# Patient Record
Sex: FEMALE | Race: WHITE | Employment: FULL TIME | URBAN - NONMETROPOLITAN AREA
[De-identification: names, ages, dates, MRNs, and addresses within clinical notes are randomized per-mention and may not be internally consistent; named-entity substitution may affect disease eponyms.]

---

## 2017-02-01 ENCOUNTER — OFFICE VISIT (OUTPATIENT)
Dept: PRIMARY CARE CLINIC | Age: 32
End: 2017-02-01

## 2017-02-01 VITALS
HEIGHT: 64 IN | TEMPERATURE: 98.2 F | WEIGHT: 181.8 LBS | DIASTOLIC BLOOD PRESSURE: 90 MMHG | HEART RATE: 79 BPM | SYSTOLIC BLOOD PRESSURE: 132 MMHG | RESPIRATION RATE: 16 BRPM | OXYGEN SATURATION: 100 % | BODY MASS INDEX: 31.04 KG/M2

## 2017-02-01 DIAGNOSIS — J06.9 ACUTE URI: ICD-10-CM

## 2017-02-01 DIAGNOSIS — H66.91 RIGHT OTITIS MEDIA, UNSPECIFIED CHRONICITY, UNSPECIFIED OTITIS MEDIA TYPE: Primary | ICD-10-CM

## 2017-02-01 DIAGNOSIS — R51.9 INTRACTABLE EPISODIC HEADACHE, UNSPECIFIED HEADACHE TYPE: ICD-10-CM

## 2017-02-01 PROCEDURE — 99202 OFFICE O/P NEW SF 15 MIN: CPT | Performed by: NURSE PRACTITIONER

## 2017-02-01 RX ORDER — AMOXICILLIN AND CLAVULANATE POTASSIUM 875; 125 MG/1; MG/1
1 TABLET, FILM COATED ORAL 2 TIMES DAILY
Qty: 20 TABLET | Refills: 0 | Status: SHIPPED | OUTPATIENT
Start: 2017-02-01 | End: 2017-02-11

## 2017-02-01 RX ORDER — FLUTICASONE PROPIONATE 50 MCG
2 SPRAY, SUSPENSION (ML) NASAL DAILY
Qty: 1 BOTTLE | Refills: 0 | Status: SHIPPED | OUTPATIENT
Start: 2017-02-01 | End: 2017-06-27 | Stop reason: ALTCHOICE

## 2017-02-01 ASSESSMENT — ENCOUNTER SYMPTOMS
SORE THROAT: 1
RESPIRATORY NEGATIVE: 1

## 2017-02-22 ENCOUNTER — OFFICE VISIT (OUTPATIENT)
Dept: PRIMARY CARE CLINIC | Age: 32
End: 2017-02-22

## 2017-02-22 VITALS
HEIGHT: 64 IN | HEART RATE: 72 BPM | BODY MASS INDEX: 33.63 KG/M2 | WEIGHT: 197 LBS | OXYGEN SATURATION: 99 % | DIASTOLIC BLOOD PRESSURE: 84 MMHG | SYSTOLIC BLOOD PRESSURE: 130 MMHG

## 2017-02-22 DIAGNOSIS — S69.91XA FINGER INJURY, RIGHT, INITIAL ENCOUNTER: ICD-10-CM

## 2017-02-22 DIAGNOSIS — M54.50 ACUTE RIGHT-SIDED LOW BACK PAIN WITHOUT SCIATICA: ICD-10-CM

## 2017-02-22 DIAGNOSIS — S62.646A CLOSED NONDISPLACED FRACTURE OF PROXIMAL PHALANX OF RIGHT LITTLE FINGER, INITIAL ENCOUNTER: Primary | ICD-10-CM

## 2017-02-22 PROCEDURE — 99214 OFFICE O/P EST MOD 30 MIN: CPT | Performed by: FAMILY MEDICINE

## 2017-02-22 PROCEDURE — L3927 FO PIP DIP NO JT SPR PRE OTS: HCPCS | Performed by: FAMILY MEDICINE

## 2017-02-22 RX ORDER — HYDROCODONE BITARTRATE AND ACETAMINOPHEN 5; 325 MG/1; MG/1
1 TABLET ORAL EVERY 6 HOURS PRN
Qty: 20 TABLET | Refills: 0 | Status: SHIPPED | OUTPATIENT
Start: 2017-02-22 | End: 2017-02-27

## 2017-02-22 RX ORDER — CYCLOBENZAPRINE HCL 5 MG
5 TABLET ORAL NIGHTLY PRN
Qty: 30 TABLET | Refills: 1 | Status: SHIPPED | OUTPATIENT
Start: 2017-02-22 | End: 2017-03-04

## 2017-02-22 ASSESSMENT — ENCOUNTER SYMPTOMS
CONSTIPATION: 0
ABDOMINAL PAIN: 0
BACK PAIN: 1
BOWEL INCONTINENCE: 0
DIARRHEA: 0

## 2017-02-27 ENCOUNTER — OFFICE VISIT (OUTPATIENT)
Dept: ORTHOPEDIC SURGERY | Age: 32
End: 2017-02-27

## 2017-02-27 VITALS
SYSTOLIC BLOOD PRESSURE: 122 MMHG | BODY MASS INDEX: 33.63 KG/M2 | HEIGHT: 64 IN | DIASTOLIC BLOOD PRESSURE: 74 MMHG | WEIGHT: 197 LBS | HEART RATE: 74 BPM

## 2017-02-27 DIAGNOSIS — S62.609A FINGER FRACTURE, RIGHT, CLOSED, INITIAL ENCOUNTER: Primary | ICD-10-CM

## 2017-02-27 PROCEDURE — 26720 TREAT FINGER FRACTURE EACH: CPT | Performed by: PHYSICIAN ASSISTANT

## 2017-02-27 RX ORDER — TRAMADOL HYDROCHLORIDE 50 MG/1
50 TABLET ORAL EVERY 6 HOURS PRN
Qty: 50 TABLET | Refills: 0 | Status: SHIPPED | OUTPATIENT
Start: 2017-02-27 | End: 2017-03-09

## 2017-03-09 DIAGNOSIS — S62.609S: Primary | ICD-10-CM

## 2017-03-14 ENCOUNTER — OFFICE VISIT (OUTPATIENT)
Dept: ORTHOPEDIC SURGERY | Age: 32
End: 2017-03-14

## 2017-03-14 VITALS
BODY MASS INDEX: 33.63 KG/M2 | SYSTOLIC BLOOD PRESSURE: 118 MMHG | HEART RATE: 64 BPM | DIASTOLIC BLOOD PRESSURE: 70 MMHG | WEIGHT: 197 LBS | HEIGHT: 64 IN

## 2017-03-14 DIAGNOSIS — S62.609S: Primary | ICD-10-CM

## 2017-03-14 PROCEDURE — 99024 POSTOP FOLLOW-UP VISIT: CPT | Performed by: PHYSICIAN ASSISTANT

## 2017-03-14 RX ORDER — TRAMADOL HYDROCHLORIDE 50 MG/1
50 TABLET ORAL PRN
COMMUNITY
End: 2017-03-21 | Stop reason: ALTCHOICE

## 2017-03-21 ENCOUNTER — OFFICE VISIT (OUTPATIENT)
Dept: FAMILY MEDICINE CLINIC | Age: 32
End: 2017-03-21
Payer: MEDICARE

## 2017-03-21 ENCOUNTER — NURSE ONLY (OUTPATIENT)
Dept: LAB | Age: 32
End: 2017-03-21
Payer: MEDICARE

## 2017-03-21 VITALS
HEIGHT: 64 IN | SYSTOLIC BLOOD PRESSURE: 138 MMHG | OXYGEN SATURATION: 99 % | BODY MASS INDEX: 33.46 KG/M2 | HEART RATE: 77 BPM | DIASTOLIC BLOOD PRESSURE: 70 MMHG | WEIGHT: 196 LBS

## 2017-03-21 DIAGNOSIS — F32.A ANXIETY AND DEPRESSION: ICD-10-CM

## 2017-03-21 DIAGNOSIS — Z12.4 SCREENING FOR CERVICAL CANCER: ICD-10-CM

## 2017-03-21 DIAGNOSIS — Z23 IMMUNIZATION DUE: ICD-10-CM

## 2017-03-21 DIAGNOSIS — H61.21 IMPACTED CERUMEN OF RIGHT EAR: ICD-10-CM

## 2017-03-21 DIAGNOSIS — Z76.89 ENCOUNTER TO ESTABLISH CARE: Primary | ICD-10-CM

## 2017-03-21 DIAGNOSIS — H66.013 ACUTE SUPPURATIVE OTITIS MEDIA OF BOTH EARS WITH SPONTANEOUS RUPTURE OF TYMPANIC MEMBRANES, RECURRENCE NOT SPECIFIED: ICD-10-CM

## 2017-03-21 DIAGNOSIS — Z11.4 ENCOUNTER FOR SCREENING FOR HIV: ICD-10-CM

## 2017-03-21 DIAGNOSIS — F41.9 ANXIETY AND DEPRESSION: ICD-10-CM

## 2017-03-21 PROCEDURE — 90686 IIV4 VACC NO PRSV 0.5 ML IM: CPT | Performed by: NURSE PRACTITIONER

## 2017-03-21 PROCEDURE — 90471 IMMUNIZATION ADMIN: CPT | Performed by: NURSE PRACTITIONER

## 2017-03-21 PROCEDURE — 90715 TDAP VACCINE 7 YRS/> IM: CPT | Performed by: NURSE PRACTITIONER

## 2017-03-21 PROCEDURE — 99213 OFFICE O/P EST LOW 20 MIN: CPT | Performed by: NURSE PRACTITIONER

## 2017-03-21 PROCEDURE — 99395 PREV VISIT EST AGE 18-39: CPT | Performed by: NURSE PRACTITIONER

## 2017-03-21 PROCEDURE — 90472 IMMUNIZATION ADMIN EACH ADD: CPT | Performed by: NURSE PRACTITIONER

## 2017-03-21 RX ORDER — ESCITALOPRAM OXALATE 20 MG/1
20 TABLET ORAL DAILY
Qty: 30 TABLET | Refills: 0 | Status: SHIPPED | OUTPATIENT
Start: 2017-03-21 | End: 2017-04-17 | Stop reason: SDUPTHER

## 2017-03-21 RX ORDER — HYDROXYZINE PAMOATE 25 MG/1
25 CAPSULE ORAL 2 TIMES DAILY PRN
Qty: 14 CAPSULE | Refills: 0 | Status: SHIPPED | OUTPATIENT
Start: 2017-03-21 | End: 2017-04-17 | Stop reason: SDUPTHER

## 2017-03-21 RX ORDER — ESCITALOPRAM OXALATE 10 MG/1
10 TABLET ORAL DAILY
Qty: 7 TABLET | Refills: 0 | Status: SHIPPED | OUTPATIENT
Start: 2017-03-21 | End: 2017-04-17 | Stop reason: DRUGHIGH

## 2017-03-21 RX ORDER — CEFDINIR 300 MG/1
300 CAPSULE ORAL 2 TIMES DAILY
Qty: 20 CAPSULE | Refills: 0 | Status: SHIPPED | OUTPATIENT
Start: 2017-03-21 | End: 2017-03-31

## 2017-03-21 ASSESSMENT — ENCOUNTER SYMPTOMS
TROUBLE SWALLOWING: 0
CONSTIPATION: 0
SINUS PRESSURE: 0
COUGH: 0
ABDOMINAL PAIN: 0
VOMITING: 0
ALLERGIC/IMMUNOLOGIC NEGATIVE: 1
EYES NEGATIVE: 1
RESPIRATORY NEGATIVE: 1
GASTROINTESTINAL NEGATIVE: 1
CHEST TIGHTNESS: 0
NAUSEA: 0
SHORTNESS OF BREATH: 0
DIARRHEA: 0

## 2017-04-17 ENCOUNTER — NURSE ONLY (OUTPATIENT)
Dept: LAB | Age: 32
End: 2017-04-17
Payer: MEDICARE

## 2017-04-17 ENCOUNTER — OFFICE VISIT (OUTPATIENT)
Dept: FAMILY MEDICINE CLINIC | Age: 32
End: 2017-04-17
Payer: MEDICARE

## 2017-04-17 VITALS
TEMPERATURE: 97.5 F | BODY MASS INDEX: 32.03 KG/M2 | WEIGHT: 187.6 LBS | OXYGEN SATURATION: 98 % | SYSTOLIC BLOOD PRESSURE: 106 MMHG | RESPIRATION RATE: 12 BRPM | HEIGHT: 64 IN | HEART RATE: 70 BPM | DIASTOLIC BLOOD PRESSURE: 70 MMHG

## 2017-04-17 DIAGNOSIS — F41.9 ANXIETY AND DEPRESSION: Primary | ICD-10-CM

## 2017-04-17 DIAGNOSIS — Z00.00 ROUTINE HEALTH MAINTENANCE: ICD-10-CM

## 2017-04-17 DIAGNOSIS — F32.A ANXIETY AND DEPRESSION: Primary | ICD-10-CM

## 2017-04-17 DIAGNOSIS — Z71.6 ENCOUNTER FOR TOBACCO USE CESSATION COUNSELING: ICD-10-CM

## 2017-04-17 PROCEDURE — 90732 PPSV23 VACC 2 YRS+ SUBQ/IM: CPT | Performed by: NURSE PRACTITIONER

## 2017-04-17 PROCEDURE — 90471 IMMUNIZATION ADMIN: CPT | Performed by: NURSE PRACTITIONER

## 2017-04-17 PROCEDURE — 99214 OFFICE O/P EST MOD 30 MIN: CPT | Performed by: NURSE PRACTITIONER

## 2017-04-17 RX ORDER — CYCLOBENZAPRINE HCL 5 MG
5 TABLET ORAL DAILY PRN
COMMUNITY
Start: 2017-03-21 | End: 2017-05-15 | Stop reason: ALTCHOICE

## 2017-04-17 RX ORDER — HYDROXYZINE PAMOATE 25 MG/1
25 CAPSULE ORAL 2 TIMES DAILY PRN
Qty: 14 CAPSULE | Refills: 0 | Status: SHIPPED | OUTPATIENT
Start: 2017-04-17 | End: 2017-04-24

## 2017-04-17 RX ORDER — VARENICLINE TARTRATE 1 MG/1
1 TABLET, FILM COATED ORAL 2 TIMES DAILY
Qty: 60 TABLET | Refills: 3 | Status: SHIPPED | OUTPATIENT
Start: 2017-04-17 | End: 2017-06-09

## 2017-04-17 RX ORDER — ESCITALOPRAM OXALATE 20 MG/1
20 TABLET ORAL DAILY
Qty: 30 TABLET | Refills: 3 | Status: SHIPPED | OUTPATIENT
Start: 2017-04-17 | End: 2017-06-09 | Stop reason: ALTCHOICE

## 2017-04-17 ASSESSMENT — PATIENT HEALTH QUESTIONNAIRE - PHQ9
2. FEELING DOWN, DEPRESSED OR HOPELESS: 1
1. LITTLE INTEREST OR PLEASURE IN DOING THINGS: 0
SUM OF ALL RESPONSES TO PHQ QUESTIONS 1-9: 1
SUM OF ALL RESPONSES TO PHQ9 QUESTIONS 1 & 2: 1

## 2017-04-17 ASSESSMENT — ENCOUNTER SYMPTOMS
SINUS PRESSURE: 0
COUGH: 0
DIARRHEA: 0
CONSTIPATION: 0
CHEST TIGHTNESS: 0
ABDOMINAL PAIN: 0
ALLERGIC/IMMUNOLOGIC NEGATIVE: 1
TROUBLE SWALLOWING: 0
GASTROINTESTINAL NEGATIVE: 1
SHORTNESS OF BREATH: 0
VOMITING: 0
RESPIRATORY NEGATIVE: 1
NAUSEA: 0
EYES NEGATIVE: 1

## 2017-05-05 DIAGNOSIS — M25.561 RIGHT KNEE PAIN, UNSPECIFIED CHRONICITY: Primary | ICD-10-CM

## 2017-05-09 ENCOUNTER — TELEPHONE (OUTPATIENT)
Dept: FAMILY MEDICINE CLINIC | Age: 32
End: 2017-05-09

## 2017-05-11 ENCOUNTER — HOSPITAL ENCOUNTER (OUTPATIENT)
Age: 32
Setting detail: SPECIMEN
Discharge: HOME | End: 2017-05-11
Payer: MEDICARE

## 2017-05-11 LAB
HIV AG/AB: NONREACTIVE
T4 TOTAL: 5.3 UG/DL (ref 4.5–12)
VITAMIN D 25-HYDROXY: 13.6 NG/ML (ref 30–100)

## 2017-05-12 DIAGNOSIS — E55.9 VITAMIN D DEFICIENCY: Primary | ICD-10-CM

## 2017-05-12 RX ORDER — ERGOCALCIFEROL 1.25 MG/1
50000 CAPSULE ORAL WEEKLY
Qty: 12 CAPSULE | Refills: 0 | Status: SHIPPED | OUTPATIENT
Start: 2017-05-12 | End: 2018-07-31 | Stop reason: ALTCHOICE

## 2017-05-15 ENCOUNTER — OFFICE VISIT (OUTPATIENT)
Dept: PRIMARY CARE CLINIC | Age: 32
End: 2017-05-15
Payer: MEDICARE

## 2017-05-15 VITALS
HEIGHT: 64 IN | SYSTOLIC BLOOD PRESSURE: 126 MMHG | BODY MASS INDEX: 35.15 KG/M2 | OXYGEN SATURATION: 98 % | TEMPERATURE: 98.3 F | DIASTOLIC BLOOD PRESSURE: 74 MMHG | WEIGHT: 205.9 LBS | HEART RATE: 88 BPM

## 2017-05-15 DIAGNOSIS — K02.9 PAIN DUE TO DENTAL CARIES: Primary | ICD-10-CM

## 2017-05-15 PROCEDURE — 99213 OFFICE O/P EST LOW 20 MIN: CPT | Performed by: PHYSICIAN ASSISTANT

## 2017-05-15 RX ORDER — PENICILLIN V POTASSIUM 500 MG/1
500 TABLET ORAL 3 TIMES DAILY
Qty: 30 TABLET | Refills: 0 | Status: SHIPPED | OUTPATIENT
Start: 2017-05-15 | End: 2017-05-25

## 2017-05-15 RX ORDER — IBUPROFEN 800 MG/1
800 TABLET ORAL EVERY 8 HOURS PRN
Qty: 30 TABLET | Refills: 0 | Status: SHIPPED | OUTPATIENT
Start: 2017-05-15 | End: 2017-06-17

## 2017-05-15 RX ORDER — ACETAMINOPHEN AND CODEINE PHOSPHATE 300; 30 MG/1; MG/1
1 TABLET ORAL EVERY 6 HOURS PRN
Qty: 10 TABLET | Refills: 0 | Status: SHIPPED | OUTPATIENT
Start: 2017-05-15 | End: 2017-06-27 | Stop reason: ALTCHOICE

## 2017-05-15 RX ORDER — KETOROLAC TROMETHAMINE 30 MG/ML
60 INJECTION, SOLUTION INTRAMUSCULAR; INTRAVENOUS ONCE
Status: COMPLETED | OUTPATIENT
Start: 2017-05-15 | End: 2017-05-15

## 2017-05-15 RX ADMIN — KETOROLAC TROMETHAMINE 60 MG: 30 INJECTION, SOLUTION INTRAMUSCULAR; INTRAVENOUS at 21:01

## 2017-05-15 ASSESSMENT — ENCOUNTER SYMPTOMS: RESPIRATORY NEGATIVE: 1

## 2017-05-16 ENCOUNTER — TELEPHONE (OUTPATIENT)
Dept: FAMILY MEDICINE CLINIC | Age: 32
End: 2017-05-16

## 2017-06-02 DIAGNOSIS — Z32.00 PREGNANCY EXAMINATION OR TEST, PREGNANCY UNCONFIRMED: Primary | ICD-10-CM

## 2017-06-09 ENCOUNTER — OFFICE VISIT (OUTPATIENT)
Dept: FAMILY MEDICINE CLINIC | Age: 32
End: 2017-06-09
Payer: MEDICARE

## 2017-06-09 VITALS
WEIGHT: 197 LBS | DIASTOLIC BLOOD PRESSURE: 78 MMHG | OXYGEN SATURATION: 98 % | HEIGHT: 64 IN | BODY MASS INDEX: 33.63 KG/M2 | HEART RATE: 78 BPM | SYSTOLIC BLOOD PRESSURE: 128 MMHG

## 2017-06-09 DIAGNOSIS — K59.03 DRUG-INDUCED CONSTIPATION: ICD-10-CM

## 2017-06-09 DIAGNOSIS — F32.A DEPRESSION, UNSPECIFIED DEPRESSION TYPE: ICD-10-CM

## 2017-06-09 DIAGNOSIS — Z72.0 TOBACCO ABUSE: ICD-10-CM

## 2017-06-09 DIAGNOSIS — Z91.09 ENVIRONMENTAL ALLERGIES: Primary | ICD-10-CM

## 2017-06-09 PROCEDURE — 99214 OFFICE O/P EST MOD 30 MIN: CPT | Performed by: NURSE PRACTITIONER

## 2017-06-09 RX ORDER — FEXOFENADINE HCL 180 MG/1
180 TABLET ORAL DAILY
Qty: 30 TABLET | Refills: 1 | Status: SHIPPED | OUTPATIENT
Start: 2017-06-09 | End: 2017-07-07 | Stop reason: ALTCHOICE

## 2017-06-09 RX ORDER — ARIPIPRAZOLE 5 MG/1
5 TABLET ORAL DAILY
Qty: 30 TABLET | Refills: 1 | Status: SHIPPED | OUTPATIENT
Start: 2017-06-09 | End: 2017-11-16 | Stop reason: ALTCHOICE

## 2017-06-09 RX ORDER — POLYETHYLENE GLYCOL 3350 17 G/17G
17 POWDER, FOR SOLUTION ORAL DAILY PRN
Qty: 510 G | Refills: 0 | Status: SHIPPED | OUTPATIENT
Start: 2017-06-09 | End: 2017-08-08 | Stop reason: SDUPTHER

## 2017-06-09 ASSESSMENT — ENCOUNTER SYMPTOMS
SINUS PRESSURE: 0
EYES NEGATIVE: 1
ABDOMINAL PAIN: 0
GASTROINTESTINAL NEGATIVE: 1
VOMITING: 0
DIARRHEA: 0
COUGH: 0
CHEST TIGHTNESS: 0
TROUBLE SWALLOWING: 0
SHORTNESS OF BREATH: 0
RESPIRATORY NEGATIVE: 1
NAUSEA: 0
ALLERGIC/IMMUNOLOGIC NEGATIVE: 1
CONSTIPATION: 0

## 2017-06-12 ENCOUNTER — OFFICE VISIT (OUTPATIENT)
Dept: PRIMARY CARE CLINIC | Age: 32
End: 2017-06-12
Payer: MEDICARE

## 2017-06-12 VITALS
SYSTOLIC BLOOD PRESSURE: 128 MMHG | RESPIRATION RATE: 16 BRPM | OXYGEN SATURATION: 99 % | WEIGHT: 200 LBS | HEART RATE: 58 BPM | DIASTOLIC BLOOD PRESSURE: 68 MMHG | BODY MASS INDEX: 34.15 KG/M2 | HEIGHT: 64 IN | TEMPERATURE: 98.1 F

## 2017-06-12 DIAGNOSIS — J02.9 SORE THROAT: ICD-10-CM

## 2017-06-12 DIAGNOSIS — J02.9 SORE THROAT: Primary | ICD-10-CM

## 2017-06-12 LAB
MONONUCLEOSIS SCREEN: NEGATIVE
S PYO AG THROAT QL: NORMAL

## 2017-06-12 PROCEDURE — 36415 COLL VENOUS BLD VENIPUNCTURE: CPT | Performed by: FAMILY MEDICINE

## 2017-06-12 PROCEDURE — 87880 STREP A ASSAY W/OPTIC: CPT | Performed by: FAMILY MEDICINE

## 2017-06-12 PROCEDURE — 99213 OFFICE O/P EST LOW 20 MIN: CPT | Performed by: FAMILY MEDICINE

## 2017-06-12 PROCEDURE — 86308 HETEROPHILE ANTIBODY SCREEN: CPT | Performed by: FAMILY MEDICINE

## 2017-06-12 RX ORDER — PREDNISONE 20 MG/1
20 TABLET ORAL DAILY
Qty: 7 TABLET | Refills: 0 | Status: SHIPPED | OUTPATIENT
Start: 2017-06-12 | End: 2017-06-19

## 2017-06-12 ASSESSMENT — ENCOUNTER SYMPTOMS
CHOKING: 0
WHEEZING: 0
SINUS PRESSURE: 0
DIARRHEA: 0
CONSTIPATION: 0
NAUSEA: 0
TROUBLE SWALLOWING: 0
SHORTNESS OF BREATH: 0
COUGH: 0
SORE THROAT: 1
CHEST TIGHTNESS: 0

## 2017-06-14 ENCOUNTER — TELEPHONE (OUTPATIENT)
Dept: FAMILY MEDICINE CLINIC | Age: 32
End: 2017-06-14

## 2017-06-14 DIAGNOSIS — Z72.0 TOBACCO ABUSE: Primary | ICD-10-CM

## 2017-06-14 RX ORDER — NICOTINE 21 MG/24HR
1 PATCH, TRANSDERMAL 24 HOURS TRANSDERMAL EVERY 24 HOURS
Qty: 30 PATCH | Refills: 3 | Status: SHIPPED | OUTPATIENT
Start: 2017-06-14 | End: 2017-11-16

## 2017-06-17 ENCOUNTER — HOSPITAL ENCOUNTER (EMERGENCY)
Age: 32
Discharge: HOME | End: 2017-06-17
Attending: EMERGENCY MEDICINE
Payer: MEDICARE

## 2017-06-17 ENCOUNTER — APPOINTMENT (OUTPATIENT)
Dept: ULTRASOUND IMAGING | Age: 32
End: 2017-06-17
Payer: MEDICARE

## 2017-06-17 VITALS
OXYGEN SATURATION: 96 % | HEART RATE: 58 BPM | HEIGHT: 64 IN | SYSTOLIC BLOOD PRESSURE: 126 MMHG | DIASTOLIC BLOOD PRESSURE: 75 MMHG | WEIGHT: 203 LBS | BODY MASS INDEX: 34.66 KG/M2 | RESPIRATION RATE: 18 BRPM | TEMPERATURE: 97.9 F

## 2017-06-17 DIAGNOSIS — R10.9 ABDOMINAL CRAMPING: ICD-10-CM

## 2017-06-17 DIAGNOSIS — R10.2 PELVIC PAIN IN FEMALE: ICD-10-CM

## 2017-06-17 DIAGNOSIS — N92.0 SPOTTING: Primary | ICD-10-CM

## 2017-06-17 LAB
-: ABNORMAL
AMORPHOUS: ABNORMAL
BACTERIA: ABNORMAL
BILIRUBIN URINE: NEGATIVE
CASTS UA: ABNORMAL /LPF (ref 0–2)
COLOR: ABNORMAL
COMMENT UA: ABNORMAL
CRYSTALS, UA: ABNORMAL /HPF
DIRECT EXAM: NORMAL
EPITHELIAL CELLS UA: ABNORMAL /HPF (ref 0–5)
GLUCOSE URINE: NEGATIVE
HCG QUALITATIVE: NEGATIVE
KETONES, URINE: NEGATIVE
LEUKOCYTE ESTERASE, URINE: NEGATIVE
Lab: NORMAL
MUCUS: ABNORMAL
NITRITE, URINE: NEGATIVE
OTHER OBSERVATIONS UA: ABNORMAL
PH UA: 5.5 (ref 5–6)
PROTEIN UA: NEGATIVE
RBC UA: >100 /HPF (ref 0–4)
RENAL EPITHELIAL, UA: ABNORMAL /HPF
SPECIFIC GRAVITY UA: 1.02 (ref 1.01–1.02)
SPECIMEN DESCRIPTION: NORMAL
STATUS: NORMAL
TRICHOMONAS: ABNORMAL
TURBIDITY: ABNORMAL
URINE HGB: ABNORMAL
UROBILINOGEN, URINE: NORMAL
WBC UA: ABNORMAL /HPF (ref 0–4)
YEAST: ABNORMAL

## 2017-06-17 PROCEDURE — 99284 EMERGENCY DEPT VISIT MOD MDM: CPT

## 2017-06-17 PROCEDURE — 76830 TRANSVAGINAL US NON-OB: CPT | Performed by: RADIOLOGY

## 2017-06-17 PROCEDURE — 96372 THER/PROPH/DIAG INJ SC/IM: CPT

## 2017-06-17 PROCEDURE — 84703 CHORIONIC GONADOTROPIN ASSAY: CPT

## 2017-06-17 PROCEDURE — 81001 URINALYSIS AUTO W/SCOPE: CPT

## 2017-06-17 PROCEDURE — 87210 SMEAR WET MOUNT SALINE/INK: CPT

## 2017-06-17 PROCEDURE — 6360000002 HC RX W HCPCS: Performed by: EMERGENCY MEDICINE

## 2017-06-17 PROCEDURE — 76856 US EXAM PELVIC COMPLETE: CPT | Performed by: RADIOLOGY

## 2017-06-17 PROCEDURE — 36415 COLL VENOUS BLD VENIPUNCTURE: CPT

## 2017-06-17 PROCEDURE — 76856 US EXAM PELVIC COMPLETE: CPT

## 2017-06-17 PROCEDURE — 87491 CHLMYD TRACH DNA AMP PROBE: CPT

## 2017-06-17 PROCEDURE — 93975 VASCULAR STUDY: CPT

## 2017-06-17 PROCEDURE — 93975 VASCULAR STUDY: CPT | Performed by: RADIOLOGY

## 2017-06-17 PROCEDURE — 76830 TRANSVAGINAL US NON-OB: CPT

## 2017-06-17 PROCEDURE — 87591 N.GONORRHOEAE DNA AMP PROB: CPT

## 2017-06-17 RX ORDER — IBUPROFEN 600 MG/1
600 TABLET ORAL EVERY 6 HOURS PRN
Qty: 30 TABLET | Refills: 0 | Status: SHIPPED | OUTPATIENT
Start: 2017-06-17 | End: 2017-07-07 | Stop reason: ALTCHOICE

## 2017-06-17 RX ORDER — KETOROLAC TROMETHAMINE 30 MG/ML
30 INJECTION, SOLUTION INTRAMUSCULAR; INTRAVENOUS ONCE
Status: COMPLETED | OUTPATIENT
Start: 2017-06-17 | End: 2017-06-17

## 2017-06-17 RX ADMIN — KETOROLAC TROMETHAMINE 30 MG: 30 INJECTION, SOLUTION INTRAMUSCULAR at 20:39

## 2017-06-17 ASSESSMENT — PAIN SCALES - WONG BAKER
WONGBAKER_NUMERICALRESPONSE: 6
WONGBAKER_NUMERICALRESPONSE: 6

## 2017-06-17 ASSESSMENT — PAIN DESCRIPTION - ORIENTATION: ORIENTATION: LEFT;LOWER;RIGHT

## 2017-06-17 ASSESSMENT — PAIN DESCRIPTION - PAIN TYPE: TYPE: ACUTE PAIN

## 2017-06-17 ASSESSMENT — PAIN SCALES - GENERAL
PAINLEVEL_OUTOF10: 5
PAINLEVEL_OUTOF10: 6
PAINLEVEL_OUTOF10: 6

## 2017-06-17 ASSESSMENT — PAIN DESCRIPTION - LOCATION: LOCATION: ABDOMEN

## 2017-06-17 ASSESSMENT — ENCOUNTER SYMPTOMS
RESPIRATORY NEGATIVE: 1
GASTROINTESTINAL NEGATIVE: 1
EYES NEGATIVE: 1

## 2017-06-17 ASSESSMENT — PAIN DESCRIPTION - ONSET: ONSET: SUDDEN

## 2017-06-17 ASSESSMENT — PAIN DESCRIPTION - DESCRIPTORS: DESCRIPTORS: CRAMPING

## 2017-06-19 LAB
C TRACH DNA GENITAL QL NAA+PROBE: NEGATIVE
N. GONORRHOEAE DNA: NEGATIVE

## 2017-06-22 ENCOUNTER — TELEPHONE (OUTPATIENT)
Dept: FAMILY MEDICINE CLINIC | Age: 32
End: 2017-06-22

## 2017-06-27 ENCOUNTER — OFFICE VISIT (OUTPATIENT)
Dept: OBGYN | Age: 32
End: 2017-06-27
Payer: MEDICARE

## 2017-06-27 ENCOUNTER — HOSPITAL ENCOUNTER (OUTPATIENT)
Age: 32
Setting detail: SPECIMEN
Discharge: HOME | End: 2017-06-27
Payer: MEDICARE

## 2017-06-27 VITALS
WEIGHT: 212 LBS | RESPIRATION RATE: 16 BRPM | DIASTOLIC BLOOD PRESSURE: 82 MMHG | BODY MASS INDEX: 36.19 KG/M2 | HEIGHT: 64 IN | SYSTOLIC BLOOD PRESSURE: 120 MMHG | HEART RATE: 72 BPM

## 2017-06-27 DIAGNOSIS — Z01.419 WELL WOMAN EXAM WITH ROUTINE GYNECOLOGICAL EXAM: Primary | ICD-10-CM

## 2017-06-27 PROCEDURE — 99203 OFFICE O/P NEW LOW 30 MIN: CPT | Performed by: OBSTETRICS & GYNECOLOGY

## 2017-06-27 ASSESSMENT — ENCOUNTER SYMPTOMS
RESPIRATORY NEGATIVE: 1
GASTROINTESTINAL NEGATIVE: 1
EYES NEGATIVE: 1

## 2017-06-29 DIAGNOSIS — J06.9 ACUTE URI: ICD-10-CM

## 2017-06-29 DIAGNOSIS — R51.9 INTRACTABLE EPISODIC HEADACHE, UNSPECIFIED HEADACHE TYPE: ICD-10-CM

## 2017-06-29 LAB — CYTOLOGY REPORT: NORMAL

## 2017-06-29 RX ORDER — FLUTICASONE PROPIONATE 50 MCG
2 SPRAY, SUSPENSION (ML) NASAL DAILY
Qty: 1 BOTTLE | Refills: 0 | Status: SHIPPED | OUTPATIENT
Start: 2017-06-29 | End: 2017-08-07 | Stop reason: SDUPTHER

## 2017-07-07 ENCOUNTER — TELEPHONE (OUTPATIENT)
Dept: OBGYN | Age: 32
End: 2017-07-07

## 2017-07-07 ENCOUNTER — OFFICE VISIT (OUTPATIENT)
Dept: FAMILY MEDICINE CLINIC | Age: 32
End: 2017-07-07
Payer: MEDICARE

## 2017-07-07 VITALS
WEIGHT: 211.6 LBS | RESPIRATION RATE: 12 BRPM | BODY MASS INDEX: 36.12 KG/M2 | HEIGHT: 64 IN | TEMPERATURE: 97.9 F | OXYGEN SATURATION: 100 % | HEART RATE: 73 BPM | DIASTOLIC BLOOD PRESSURE: 58 MMHG | SYSTOLIC BLOOD PRESSURE: 104 MMHG

## 2017-07-07 DIAGNOSIS — F32.A DEPRESSION, UNSPECIFIED DEPRESSION TYPE: Primary | ICD-10-CM

## 2017-07-07 DIAGNOSIS — Z72.0 TOBACCO ABUSE: ICD-10-CM

## 2017-07-07 DIAGNOSIS — N94.6 DYSMENORRHEA: Primary | ICD-10-CM

## 2017-07-07 PROCEDURE — 99213 OFFICE O/P EST LOW 20 MIN: CPT | Performed by: NURSE PRACTITIONER

## 2017-07-07 RX ORDER — BUPROPION HYDROCHLORIDE 150 MG/1
150 TABLET ORAL EVERY MORNING
Qty: 7 TABLET | Refills: 0 | Status: SHIPPED | OUTPATIENT
Start: 2017-07-07 | End: 2018-07-31 | Stop reason: ALTCHOICE

## 2017-07-07 RX ORDER — NAPROXEN SODIUM 550 MG/1
550 TABLET ORAL 2 TIMES DAILY WITH MEALS
Qty: 60 TABLET | Refills: 3 | Status: SHIPPED | OUTPATIENT
Start: 2017-07-07 | End: 2017-11-16 | Stop reason: SDUPTHER

## 2017-07-07 RX ORDER — BUPROPION HYDROCHLORIDE 300 MG/1
300 TABLET ORAL EVERY MORNING
Qty: 30 TABLET | Refills: 1 | Status: SHIPPED | OUTPATIENT
Start: 2017-07-07 | End: 2017-11-16 | Stop reason: SDUPTHER

## 2017-07-07 ASSESSMENT — ENCOUNTER SYMPTOMS
CHEST TIGHTNESS: 0
RESPIRATORY NEGATIVE: 1
EYES NEGATIVE: 1
SHORTNESS OF BREATH: 0
TROUBLE SWALLOWING: 0
DIARRHEA: 0
CONSTIPATION: 0
NAUSEA: 0
SINUS PRESSURE: 0
ABDOMINAL PAIN: 0
GASTROINTESTINAL NEGATIVE: 1
COUGH: 0
VOMITING: 0
ALLERGIC/IMMUNOLOGIC NEGATIVE: 1

## 2017-07-11 ENCOUNTER — TELEPHONE (OUTPATIENT)
Dept: FAMILY MEDICINE CLINIC | Age: 32
End: 2017-07-11

## 2017-07-12 RX ORDER — BUSPIRONE HYDROCHLORIDE 15 MG/1
15 TABLET ORAL 3 TIMES DAILY PRN
Qty: 90 TABLET | Refills: 1 | Status: SHIPPED | OUTPATIENT
Start: 2017-07-12 | End: 2017-11-16 | Stop reason: SDUPTHER

## 2017-08-07 DIAGNOSIS — Z91.09 ENVIRONMENTAL ALLERGIES: ICD-10-CM

## 2017-08-07 DIAGNOSIS — J06.9 ACUTE URI: ICD-10-CM

## 2017-08-07 DIAGNOSIS — R12 HEART BURN: Primary | ICD-10-CM

## 2017-08-07 DIAGNOSIS — R51.9 INTRACTABLE EPISODIC HEADACHE, UNSPECIFIED HEADACHE TYPE: ICD-10-CM

## 2017-08-07 RX ORDER — FLUTICASONE PROPIONATE 50 MCG
2 SPRAY, SUSPENSION (ML) NASAL DAILY
Qty: 1 BOTTLE | Refills: 0 | Status: SHIPPED | OUTPATIENT
Start: 2017-08-07 | End: 2017-09-25 | Stop reason: SDUPTHER

## 2017-08-07 RX ORDER — FEXOFENADINE HCL 180 MG/1
180 TABLET ORAL DAILY
Qty: 30 TABLET | Refills: 0 | Status: SHIPPED | OUTPATIENT
Start: 2017-08-07 | End: 2017-09-06

## 2017-08-08 DIAGNOSIS — K59.03 DRUG-INDUCED CONSTIPATION: ICD-10-CM

## 2017-08-08 RX ORDER — POLYETHYLENE GLYCOL 3350 17 G/17G
17 POWDER, FOR SOLUTION ORAL DAILY PRN
Qty: 510 G | Refills: 0 | Status: SHIPPED | OUTPATIENT
Start: 2017-08-08 | End: 2017-09-07

## 2017-09-25 ENCOUNTER — OFFICE VISIT (OUTPATIENT)
Dept: PRIMARY CARE CLINIC | Age: 32
End: 2017-09-25
Payer: MEDICARE

## 2017-09-25 VITALS
OXYGEN SATURATION: 93 % | BODY MASS INDEX: 36.5 KG/M2 | TEMPERATURE: 97.1 F | WEIGHT: 213.8 LBS | HEART RATE: 77 BPM | SYSTOLIC BLOOD PRESSURE: 118 MMHG | HEIGHT: 64 IN | DIASTOLIC BLOOD PRESSURE: 70 MMHG

## 2017-09-25 DIAGNOSIS — J06.9 ACUTE URI: ICD-10-CM

## 2017-09-25 DIAGNOSIS — R51.9 INTRACTABLE EPISODIC HEADACHE, UNSPECIFIED HEADACHE TYPE: ICD-10-CM

## 2017-09-25 DIAGNOSIS — J40 BRONCHITIS: Primary | ICD-10-CM

## 2017-09-25 PROCEDURE — 94640 AIRWAY INHALATION TREATMENT: CPT | Performed by: NURSE PRACTITIONER

## 2017-09-25 PROCEDURE — 99213 OFFICE O/P EST LOW 20 MIN: CPT | Performed by: NURSE PRACTITIONER

## 2017-09-25 RX ORDER — FEXOFENADINE HCL 180 MG/1
180 TABLET ORAL DAILY
Qty: 30 TABLET | Refills: 0 | Status: SHIPPED | OUTPATIENT
Start: 2017-09-25 | End: 2017-11-16 | Stop reason: SDUPTHER

## 2017-09-25 RX ORDER — IPRATROPIUM BROMIDE AND ALBUTEROL SULFATE 2.5; .5 MG/3ML; MG/3ML
1 SOLUTION RESPIRATORY (INHALATION) ONCE
Status: COMPLETED | OUTPATIENT
Start: 2017-09-25 | End: 2017-09-25

## 2017-09-25 RX ORDER — FLUTICASONE PROPIONATE 50 MCG
2 SPRAY, SUSPENSION (ML) NASAL DAILY
Qty: 1 BOTTLE | Refills: 0 | Status: SHIPPED | OUTPATIENT
Start: 2017-09-25 | End: 2017-11-16 | Stop reason: SDUPTHER

## 2017-09-25 RX ORDER — AZITHROMYCIN 250 MG/1
TABLET, FILM COATED ORAL
Qty: 1 PACKET | Refills: 0 | Status: SHIPPED | OUTPATIENT
Start: 2017-09-25 | End: 2017-10-05

## 2017-09-25 RX ADMIN — IPRATROPIUM BROMIDE AND ALBUTEROL SULFATE 1 AMPULE: 2.5; .5 SOLUTION RESPIRATORY (INHALATION) at 18:14

## 2017-09-25 ASSESSMENT — ENCOUNTER SYMPTOMS
RHINORRHEA: 1
SINUS PRESSURE: 1
SHORTNESS OF BREATH: 1
SORE THROAT: 1
COUGH: 1
WHEEZING: 1

## 2017-11-16 ENCOUNTER — OFFICE VISIT (OUTPATIENT)
Dept: FAMILY MEDICINE CLINIC | Age: 32
End: 2017-11-16
Payer: MEDICARE

## 2017-11-16 ENCOUNTER — TELEPHONE (OUTPATIENT)
Dept: FAMILY MEDICINE CLINIC | Age: 32
End: 2017-11-16

## 2017-11-16 VITALS
HEART RATE: 67 BPM | DIASTOLIC BLOOD PRESSURE: 72 MMHG | TEMPERATURE: 97.2 F | SYSTOLIC BLOOD PRESSURE: 116 MMHG | OXYGEN SATURATION: 100 % | WEIGHT: 213.6 LBS | HEIGHT: 64 IN | BODY MASS INDEX: 36.47 KG/M2

## 2017-11-16 DIAGNOSIS — F41.1 GAD (GENERALIZED ANXIETY DISORDER): ICD-10-CM

## 2017-11-16 DIAGNOSIS — F33.1 MODERATE EPISODE OF RECURRENT MAJOR DEPRESSIVE DISORDER (HCC): ICD-10-CM

## 2017-11-16 DIAGNOSIS — E55.9 VITAMIN D DEFICIENCY: ICD-10-CM

## 2017-11-16 DIAGNOSIS — M25.512 ACUTE PAIN OF LEFT SHOULDER: ICD-10-CM

## 2017-11-16 DIAGNOSIS — K21.9 GASTROESOPHAGEAL REFLUX DISEASE, ESOPHAGITIS PRESENCE NOT SPECIFIED: Primary | ICD-10-CM

## 2017-11-16 DIAGNOSIS — Z72.0 TOBACCO ABUSE: ICD-10-CM

## 2017-11-16 DIAGNOSIS — J45.40 MODERATE PERSISTENT ASTHMA WITHOUT COMPLICATION: ICD-10-CM

## 2017-11-16 PROCEDURE — 99214 OFFICE O/P EST MOD 30 MIN: CPT | Performed by: FAMILY MEDICINE

## 2017-11-16 PROCEDURE — G8484 FLU IMMUNIZE NO ADMIN: HCPCS | Performed by: FAMILY MEDICINE

## 2017-11-16 PROCEDURE — G8427 DOCREV CUR MEDS BY ELIG CLIN: HCPCS | Performed by: FAMILY MEDICINE

## 2017-11-16 PROCEDURE — 4004F PT TOBACCO SCREEN RCVD TLK: CPT | Performed by: FAMILY MEDICINE

## 2017-11-16 PROCEDURE — 90686 IIV4 VACC NO PRSV 0.5 ML IM: CPT | Performed by: FAMILY MEDICINE

## 2017-11-16 PROCEDURE — 90471 IMMUNIZATION ADMIN: CPT | Performed by: FAMILY MEDICINE

## 2017-11-16 PROCEDURE — G8417 CALC BMI ABV UP PARAM F/U: HCPCS | Performed by: FAMILY MEDICINE

## 2017-11-16 RX ORDER — CYCLOBENZAPRINE HCL 5 MG
5 TABLET ORAL NIGHTLY PRN
Qty: 30 TABLET | Refills: 0 | Status: SHIPPED | OUTPATIENT
Start: 2017-11-16 | End: 2017-12-12 | Stop reason: ALTCHOICE

## 2017-11-16 RX ORDER — FLUTICASONE PROPIONATE 50 MCG
2 SPRAY, SUSPENSION (ML) NASAL DAILY
Qty: 1 BOTTLE | Refills: 3 | Status: SHIPPED | OUTPATIENT
Start: 2017-11-16 | End: 2018-03-23 | Stop reason: SDUPTHER

## 2017-11-16 RX ORDER — FEXOFENADINE HCL 180 MG/1
180 TABLET ORAL DAILY
Qty: 30 TABLET | Refills: 3 | Status: SHIPPED | OUTPATIENT
Start: 2017-11-16 | End: 2018-07-31 | Stop reason: SDUPTHER

## 2017-11-16 RX ORDER — ESCITALOPRAM OXALATE 10 MG/1
10 TABLET ORAL DAILY
Qty: 30 TABLET | Refills: 3 | Status: SHIPPED | OUTPATIENT
Start: 2017-11-16 | End: 2018-07-31 | Stop reason: SDUPTHER

## 2017-11-16 RX ORDER — BUSPIRONE HYDROCHLORIDE 15 MG/1
15 TABLET ORAL 3 TIMES DAILY PRN
Qty: 90 TABLET | Refills: 3 | Status: SHIPPED | OUTPATIENT
Start: 2017-11-16 | End: 2018-07-31 | Stop reason: SDUPTHER

## 2017-11-16 RX ORDER — BUPROPION HYDROCHLORIDE 300 MG/1
300 TABLET ORAL EVERY MORNING
Qty: 30 TABLET | Refills: 3 | Status: SHIPPED | OUTPATIENT
Start: 2017-11-16 | End: 2018-07-31 | Stop reason: SDUPTHER

## 2017-11-16 RX ORDER — FLUTICASONE PROPIONATE 44 UG/1
2 AEROSOL, METERED RESPIRATORY (INHALATION) 2 TIMES DAILY
Qty: 1 INHALER | Refills: 3 | Status: SHIPPED | OUTPATIENT
Start: 2017-11-16 | End: 2018-07-31 | Stop reason: SDUPTHER

## 2017-11-16 RX ORDER — OMEPRAZOLE 20 MG/1
20 TABLET, DELAYED RELEASE ORAL DAILY
Qty: 30 TABLET | Refills: 1 | Status: SHIPPED | OUTPATIENT
Start: 2017-11-16 | End: 2018-02-19 | Stop reason: SDUPTHER

## 2017-11-16 RX ORDER — ONDANSETRON 4 MG/1
4 TABLET, FILM COATED ORAL DAILY PRN
Qty: 40 TABLET | Refills: 1 | Status: SHIPPED | OUTPATIENT
Start: 2017-11-16 | End: 2018-02-19 | Stop reason: SDUPTHER

## 2017-11-16 RX ORDER — NAPROXEN SODIUM 550 MG/1
550 TABLET ORAL 2 TIMES DAILY WITH MEALS
Qty: 60 TABLET | Refills: 3 | Status: SHIPPED | OUTPATIENT
Start: 2017-11-16 | End: 2018-03-12 | Stop reason: SDUPTHER

## 2017-11-16 ASSESSMENT — ENCOUNTER SYMPTOMS
SINUS PRESSURE: 0
SHORTNESS OF BREATH: 1
BACK PAIN: 1
CONSTIPATION: 0
ABDOMINAL PAIN: 1
CHEST TIGHTNESS: 0
COUGH: 0
WHEEZING: 1
DIARRHEA: 0

## 2017-11-16 ASSESSMENT — PATIENT HEALTH QUESTIONNAIRE - PHQ9
SUM OF ALL RESPONSES TO PHQ9 QUESTIONS 1 & 2: 0
1. LITTLE INTEREST OR PLEASURE IN DOING THINGS: 0
2. FEELING DOWN, DEPRESSED OR HOPELESS: 0
SUM OF ALL RESPONSES TO PHQ QUESTIONS 1-9: 0
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
## 2017-11-16 NOTE — TELEPHONE ENCOUNTER
Pt calling stating that she has severe nausea since starting new medications. Pt is requesting something be calling in for nausea. Please call pt.

## 2017-11-17 ENCOUNTER — TELEPHONE (OUTPATIENT)
Dept: FAMILY MEDICINE CLINIC | Age: 32
End: 2017-11-17

## 2017-12-12 ENCOUNTER — HOSPITAL ENCOUNTER (OUTPATIENT)
Dept: GENERAL RADIOLOGY | Age: 32
Discharge: HOME | End: 2017-12-12
Payer: MEDICARE

## 2017-12-12 ENCOUNTER — HOSPITAL ENCOUNTER (OUTPATIENT)
Dept: GENERAL RADIOLOGY | Age: 32
End: 2017-12-12
Payer: MEDICARE

## 2017-12-12 ENCOUNTER — OFFICE VISIT (OUTPATIENT)
Dept: PRIMARY CARE CLINIC | Age: 32
End: 2017-12-12
Payer: MEDICARE

## 2017-12-12 VITALS
HEART RATE: 71 BPM | DIASTOLIC BLOOD PRESSURE: 64 MMHG | OXYGEN SATURATION: 100 % | WEIGHT: 212 LBS | BODY MASS INDEX: 36.39 KG/M2 | SYSTOLIC BLOOD PRESSURE: 112 MMHG

## 2017-12-12 DIAGNOSIS — M54.6 CHRONIC LEFT-SIDED THORACIC BACK PAIN: Primary | ICD-10-CM

## 2017-12-12 DIAGNOSIS — M25.512 ACUTE PAIN OF LEFT SHOULDER: ICD-10-CM

## 2017-12-12 DIAGNOSIS — M62.838 TRAPEZIUS MUSCLE SPASM: ICD-10-CM

## 2017-12-12 DIAGNOSIS — G89.29 CHRONIC LEFT-SIDED THORACIC BACK PAIN: Primary | ICD-10-CM

## 2017-12-12 PROCEDURE — 73030 X-RAY EXAM OF SHOULDER: CPT

## 2017-12-12 PROCEDURE — G8484 FLU IMMUNIZE NO ADMIN: HCPCS | Performed by: FAMILY MEDICINE

## 2017-12-12 PROCEDURE — G8427 DOCREV CUR MEDS BY ELIG CLIN: HCPCS | Performed by: FAMILY MEDICINE

## 2017-12-12 PROCEDURE — 99213 OFFICE O/P EST LOW 20 MIN: CPT | Performed by: FAMILY MEDICINE

## 2017-12-12 PROCEDURE — 4004F PT TOBACCO SCREEN RCVD TLK: CPT | Performed by: FAMILY MEDICINE

## 2017-12-12 PROCEDURE — 72050 X-RAY EXAM NECK SPINE 4/5VWS: CPT

## 2017-12-12 PROCEDURE — G8417 CALC BMI ABV UP PARAM F/U: HCPCS | Performed by: FAMILY MEDICINE

## 2017-12-12 RX ORDER — TIZANIDINE 4 MG/1
4 TABLET ORAL EVERY 8 HOURS PRN
Qty: 20 TABLET | Refills: 0 | Status: SHIPPED | OUTPATIENT
Start: 2017-12-12 | End: 2018-08-20 | Stop reason: SDUPTHER

## 2017-12-12 RX ORDER — PREDNISONE 20 MG/1
TABLET ORAL
Qty: 15 TABLET | Refills: 0 | Status: SHIPPED | OUTPATIENT
Start: 2017-12-12 | End: 2018-07-31 | Stop reason: ALTCHOICE

## 2017-12-12 ASSESSMENT — ENCOUNTER SYMPTOMS: BACK PAIN: 1

## 2017-12-12 NOTE — PATIENT INSTRUCTIONS
Patient Education        Back Pain: Care Instructions  Your Care Instructions    Back pain has many possible causes. It is often related to problems with muscles and ligaments of the back. It may also be related to problems with the nerves, discs, or bones of the back. Moving, lifting, standing, sitting, or sleeping in an awkward way can strain the back. Sometimes you don't notice the injury until later. Arthritis is another common cause of back pain. Although it may hurt a lot, back pain usually improves on its own within several weeks. Most people recover in 12 weeks or less. Using good home treatment and being careful not to stress your back can help you feel better sooner. Follow-up care is a key part of your treatment and safety. Be sure to make and go to all appointments, and call your doctor if you are having problems. It's also a good idea to know your test results and keep a list of the medicines you take. How can you care for yourself at home? · Sit or lie in positions that are most comfortable and reduce your pain. Try one of these positions when you lie down:  ¨ Lie on your back with your knees bent and supported by large pillows. ¨ Lie on the floor with your legs on the seat of a sofa or chair. Chelsie Blazer on your side with your knees and hips bent and a pillow between your legs. ¨ Lie on your stomach if it does not make pain worse. · Do not sit up in bed, and avoid soft couches and twisted positions. Bed rest can help relieve pain at first, but it delays healing. Avoid bed rest after the first day of back pain. · Change positions every 30 minutes. If you must sit for long periods of time, take breaks from sitting. Get up and walk around, or lie in a comfortable position. · Try using a heating pad on a low or medium setting for 15 to 20 minutes every 2 or 3 hours. Try a warm shower in place of one session with the heating pad.   · You can also try an ice pack for 10 to 15 minutes every 2 to 3 hours. Put a thin cloth between the ice pack and your skin. · Take pain medicines exactly as directed. ¨ If the doctor gave you a prescription medicine for pain, take it as prescribed. ¨ If you are not taking a prescription pain medicine, ask your doctor if you can take an over-the-counter medicine. · Take short walks several times a day. You can start with 5 to 10 minutes, 3 or 4 times a day, and work up to longer walks. Walk on level surfaces and avoid hills and stairs until your back is better. · Return to work and other activities as soon as you can. Continued rest without activity is usually not good for your back. · To prevent future back pain, do exercises to stretch and strengthen your back and stomach. Learn how to use good posture, safe lifting techniques, and proper body mechanics. When should you call for help? Call your doctor now or seek immediate medical care if:  ? · You have new or worsening numbness in your legs. ? · You have new or worsening weakness in your legs. (This could make it hard to stand up.)   ? · You lose control of your bladder or bowels. ? Watch closely for changes in your health, and be sure to contact your doctor if:  ? · Your pain gets worse. ? · You are not getting better after 2 weeks. Where can you learn more? Go to [email or web address]://[employer]. org and sign in to your [email or web address] account. Enter H061 in the [facility] box to learn more about \"Back Pain: Care Instructions. \"     If you do not have an account, please click on the \"Sign Up Now\" link. Current as of: March 21, 2017  Content Version: 11.4  © 1419-7918 Healthwise, Incorporated. Care instructions adapted under license by [facility] ([facility]). If you have questions about a medical condition or this instruction, always ask your healthcare professional. [address] any warranty or liability for your use of this information.

## 2018-01-17 ENCOUNTER — TELEPHONE (OUTPATIENT)
Dept: FAMILY MEDICINE CLINIC | Age: 33
End: 2018-01-17

## 2018-02-16 ENCOUNTER — TELEPHONE (OUTPATIENT)
Dept: PRIMARY CARE CLINIC | Age: 33
End: 2018-02-16

## 2018-02-19 RX ORDER — OMEPRAZOLE 20 MG/1
20 TABLET, DELAYED RELEASE ORAL DAILY
Qty: 30 TABLET | Refills: 1 | Status: SHIPPED | OUTPATIENT
Start: 2018-02-19 | End: 2018-12-18 | Stop reason: SDUPTHER

## 2018-02-19 RX ORDER — ONDANSETRON 4 MG/1
4 TABLET, FILM COATED ORAL DAILY PRN
Qty: 40 TABLET | Refills: 1 | Status: SHIPPED | OUTPATIENT
Start: 2018-02-19 | End: 2018-07-31 | Stop reason: SDUPTHER

## 2018-03-12 RX ORDER — NAPROXEN SODIUM 550 MG/1
550 TABLET ORAL 2 TIMES DAILY WITH MEALS
Qty: 60 TABLET | Refills: 3 | Status: SHIPPED | OUTPATIENT
Start: 2018-03-12 | End: 2018-03-23 | Stop reason: SDUPTHER

## 2018-03-12 NOTE — TELEPHONE ENCOUNTER
Last Appt:  11/16/2017  Next Appt:   None scheduled   Med verified in Epic    Last script 11/16/17, 1 month with 3 refills

## 2018-03-23 RX ORDER — FLUTICASONE PROPIONATE 50 MCG
SPRAY, SUSPENSION (ML) NASAL
Qty: 16 G | Refills: 0 | Status: SHIPPED | OUTPATIENT
Start: 2018-03-23 | End: 2018-07-31 | Stop reason: SDUPTHER

## 2018-03-23 RX ORDER — NAPROXEN SODIUM 550 MG/1
TABLET ORAL
Qty: 60 TABLET | Refills: 0 | Status: SHIPPED | OUTPATIENT
Start: 2018-03-23 | End: 2018-08-20 | Stop reason: SDUPTHER

## 2018-06-22 ENCOUNTER — OFFICE VISIT (OUTPATIENT)
Dept: PRIMARY CARE CLINIC | Age: 33
End: 2018-06-22
Payer: COMMERCIAL

## 2018-06-22 VITALS
TEMPERATURE: 98.2 F | SYSTOLIC BLOOD PRESSURE: 112 MMHG | OXYGEN SATURATION: 98 % | WEIGHT: 201 LBS | HEART RATE: 88 BPM | BODY MASS INDEX: 34.5 KG/M2 | DIASTOLIC BLOOD PRESSURE: 64 MMHG

## 2018-06-22 DIAGNOSIS — K52.9 GASTROENTERITIS: Primary | ICD-10-CM

## 2018-06-22 PROCEDURE — 99214 OFFICE O/P EST MOD 30 MIN: CPT | Performed by: FAMILY MEDICINE

## 2018-06-22 RX ORDER — ONDANSETRON 4 MG/1
4 TABLET, ORALLY DISINTEGRATING ORAL EVERY 8 HOURS PRN
Qty: 30 TABLET | Refills: 0 | Status: SHIPPED | OUTPATIENT
Start: 2018-06-22 | End: 2018-12-26

## 2018-06-22 ASSESSMENT — ENCOUNTER SYMPTOMS
RESPIRATORY NEGATIVE: 1
ABDOMINAL PAIN: 1
EYES NEGATIVE: 1
DIARRHEA: 0
CHANGE IN BOWEL HABIT: 1
NAUSEA: 1
VOMITING: 1

## 2018-07-13 ENCOUNTER — APPOINTMENT (OUTPATIENT)
Dept: GENERAL RADIOLOGY | Age: 33
End: 2018-07-13
Payer: COMMERCIAL

## 2018-07-13 ENCOUNTER — HOSPITAL ENCOUNTER (EMERGENCY)
Age: 33
Discharge: HOME | End: 2018-07-13
Attending: EMERGENCY MEDICINE
Payer: COMMERCIAL

## 2018-07-13 ENCOUNTER — APPOINTMENT (OUTPATIENT)
Dept: CT IMAGING | Age: 33
End: 2018-07-13
Payer: COMMERCIAL

## 2018-07-13 ENCOUNTER — TELEPHONE (OUTPATIENT)
Dept: PRIMARY CARE CLINIC | Age: 33
End: 2018-07-13

## 2018-07-13 VITALS
OXYGEN SATURATION: 96 % | HEART RATE: 93 BPM | TEMPERATURE: 99.2 F | SYSTOLIC BLOOD PRESSURE: 127 MMHG | DIASTOLIC BLOOD PRESSURE: 62 MMHG | RESPIRATION RATE: 16 BRPM

## 2018-07-13 DIAGNOSIS — S00.83XA CONTUSION OF FACE, INITIAL ENCOUNTER: Primary | ICD-10-CM

## 2018-07-13 DIAGNOSIS — S06.0X0A CONCUSSION WITHOUT LOSS OF CONSCIOUSNESS, INITIAL ENCOUNTER: ICD-10-CM

## 2018-07-13 DIAGNOSIS — M25.531 RIGHT WRIST PAIN: ICD-10-CM

## 2018-07-13 DIAGNOSIS — M79.641 PAIN OF RIGHT HAND: ICD-10-CM

## 2018-07-13 LAB
-: ABNORMAL
AMORPHOUS: ABNORMAL
BACTERIA: ABNORMAL
BILIRUBIN URINE: ABNORMAL
CASTS UA: ABNORMAL /LPF (ref 0–2)
COLOR: ABNORMAL
COMMENT UA: ABNORMAL
CRYSTALS, UA: ABNORMAL /HPF
EPITHELIAL CELLS UA: ABNORMAL /HPF (ref 0–5)
GLUCOSE URINE: NEGATIVE
HCG(URINE) PREGNANCY TEST: NEGATIVE
KETONES, URINE: ABNORMAL
LEUKOCYTE ESTERASE, URINE: NEGATIVE
MUCUS: ABNORMAL
NITRITE, URINE: NEGATIVE
OTHER OBSERVATIONS UA: ABNORMAL
PH UA: 5.5 (ref 5–6)
PROTEIN UA: NEGATIVE
RBC UA: ABNORMAL /HPF (ref 0–4)
RENAL EPITHELIAL, UA: ABNORMAL /HPF
SPECIFIC GRAVITY UA: 1.03 (ref 1.01–1.02)
TRICHOMONAS: ABNORMAL
TURBIDITY: ABNORMAL
URINE HGB: NEGATIVE
UROBILINOGEN, URINE: NORMAL
WBC UA: ABNORMAL /HPF (ref 0–4)
YEAST: ABNORMAL

## 2018-07-13 PROCEDURE — 73130 X-RAY EXAM OF HAND: CPT

## 2018-07-13 PROCEDURE — 70450 CT HEAD/BRAIN W/O DYE: CPT

## 2018-07-13 PROCEDURE — 72100 X-RAY EXAM L-S SPINE 2/3 VWS: CPT

## 2018-07-13 PROCEDURE — 73502 X-RAY EXAM HIP UNI 2-3 VIEWS: CPT

## 2018-07-13 PROCEDURE — 72125 CT NECK SPINE W/O DYE: CPT

## 2018-07-13 PROCEDURE — 70486 CT MAXILLOFACIAL W/O DYE: CPT

## 2018-07-13 PROCEDURE — 99284 EMERGENCY DEPT VISIT MOD MDM: CPT

## 2018-07-13 PROCEDURE — 73110 X-RAY EXAM OF WRIST: CPT

## 2018-07-13 PROCEDURE — 6370000000 HC RX 637 (ALT 250 FOR IP): Performed by: EMERGENCY MEDICINE

## 2018-07-13 PROCEDURE — 84703 CHORIONIC GONADOTROPIN ASSAY: CPT

## 2018-07-13 PROCEDURE — 81001 URINALYSIS AUTO W/SCOPE: CPT

## 2018-07-13 PROCEDURE — 71046 X-RAY EXAM CHEST 2 VIEWS: CPT

## 2018-07-13 RX ORDER — ACETAMINOPHEN 500 MG
1000 TABLET ORAL ONCE
Status: COMPLETED | OUTPATIENT
Start: 2018-07-13 | End: 2018-07-13

## 2018-07-13 RX ORDER — IBUPROFEN 800 MG/1
800 TABLET ORAL ONCE
Status: COMPLETED | OUTPATIENT
Start: 2018-07-13 | End: 2018-07-13

## 2018-07-13 RX ADMIN — IBUPROFEN 800 MG: 800 TABLET ORAL at 21:51

## 2018-07-13 RX ADMIN — ACETAMINOPHEN 1000 MG: 500 TABLET ORAL at 20:33

## 2018-07-13 ASSESSMENT — PAIN DESCRIPTION - PAIN TYPE: TYPE: ACUTE PAIN

## 2018-07-13 ASSESSMENT — PAIN SCALES - GENERAL: PAINLEVEL_OUTOF10: 10
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
## 2018-07-14 ASSESSMENT — ENCOUNTER SYMPTOMS
BACK PAIN: 1
SHORTNESS OF BREATH: 0
NAUSEA: 0
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
## 2018-07-31 ENCOUNTER — OFFICE VISIT (OUTPATIENT)
Dept: FAMILY MEDICINE CLINIC | Age: 33
End: 2018-07-31
Payer: COMMERCIAL

## 2018-07-31 VITALS
SYSTOLIC BLOOD PRESSURE: 110 MMHG | TEMPERATURE: 97.4 F | BODY MASS INDEX: 36.6 KG/M2 | OXYGEN SATURATION: 99 % | HEIGHT: 64 IN | DIASTOLIC BLOOD PRESSURE: 70 MMHG | RESPIRATION RATE: 12 BRPM | HEART RATE: 56 BPM | WEIGHT: 214.4 LBS

## 2018-07-31 DIAGNOSIS — J45.909 MODERATE ASTHMA, UNSPECIFIED WHETHER COMPLICATED, UNSPECIFIED WHETHER PERSISTENT: ICD-10-CM

## 2018-07-31 DIAGNOSIS — Z91.09 ENVIRONMENTAL ALLERGIES: ICD-10-CM

## 2018-07-31 DIAGNOSIS — G56.03 CARPAL TUNNEL SYNDROME ON BOTH SIDES: ICD-10-CM

## 2018-07-31 DIAGNOSIS — F33.1 MODERATE EPISODE OF RECURRENT MAJOR DEPRESSIVE DISORDER (HCC): Primary | ICD-10-CM

## 2018-07-31 DIAGNOSIS — Z72.0 TOBACCO ABUSE: ICD-10-CM

## 2018-07-31 PROCEDURE — 99215 OFFICE O/P EST HI 40 MIN: CPT | Performed by: NURSE PRACTITIONER

## 2018-07-31 RX ORDER — BUSPIRONE HYDROCHLORIDE 15 MG/1
15 TABLET ORAL 3 TIMES DAILY PRN
Qty: 90 TABLET | Refills: 3 | Status: CANCELLED | OUTPATIENT
Start: 2018-07-31

## 2018-07-31 RX ORDER — FLUTICASONE PROPIONATE 44 UG/1
2 AEROSOL, METERED RESPIRATORY (INHALATION) 2 TIMES DAILY
Qty: 1 INHALER | Refills: 3 | Status: SHIPPED | OUTPATIENT
Start: 2018-07-31 | End: 2018-12-18 | Stop reason: SDUPTHER

## 2018-07-31 RX ORDER — BUPROPION HYDROCHLORIDE 300 MG/1
300 TABLET ORAL EVERY MORNING
Qty: 30 TABLET | Refills: 3 | Status: SHIPPED | OUTPATIENT
Start: 2018-07-31 | End: 2018-09-14 | Stop reason: ALTCHOICE

## 2018-07-31 RX ORDER — BUSPIRONE HYDROCHLORIDE 15 MG/1
15 TABLET ORAL 3 TIMES DAILY PRN
Qty: 90 TABLET | Refills: 0 | Status: SHIPPED | OUTPATIENT
Start: 2018-07-31 | End: 2020-08-27

## 2018-07-31 RX ORDER — FEXOFENADINE HCL 180 MG/1
180 TABLET ORAL DAILY
Qty: 30 TABLET | Refills: 3 | Status: SHIPPED | OUTPATIENT
Start: 2018-07-31 | End: 2018-12-18 | Stop reason: SDUPTHER

## 2018-07-31 RX ORDER — ESCITALOPRAM OXALATE 10 MG/1
10 TABLET ORAL DAILY
Qty: 30 TABLET | Refills: 3 | Status: SHIPPED | OUTPATIENT
Start: 2018-07-31 | End: 2018-09-14 | Stop reason: ALTCHOICE

## 2018-07-31 RX ORDER — FLUTICASONE PROPIONATE 50 MCG
SPRAY, SUSPENSION (ML) NASAL
Qty: 16 G | Refills: 3 | Status: SHIPPED | OUTPATIENT
Start: 2018-07-31 | End: 2018-12-18 | Stop reason: SDUPTHER

## 2018-07-31 RX ORDER — BUPROPION HYDROCHLORIDE 150 MG/1
150 TABLET ORAL EVERY MORNING
Qty: 7 TABLET | Refills: 0 | Status: CANCELLED | OUTPATIENT
Start: 2018-07-31 | End: 2018-08-07

## 2018-07-31 RX ORDER — TIZANIDINE 4 MG/1
4 TABLET ORAL EVERY 8 HOURS PRN
Qty: 20 TABLET | Refills: 0 | Status: CANCELLED | OUTPATIENT
Start: 2018-07-31

## 2018-07-31 ASSESSMENT — ENCOUNTER SYMPTOMS
CONSTIPATION: 0
CHEST TIGHTNESS: 0
SINUS PRESSURE: 0
TROUBLE SWALLOWING: 0
NAUSEA: 0
DIARRHEA: 0
EYES NEGATIVE: 1
ABDOMINAL PAIN: 0
ALLERGIC/IMMUNOLOGIC NEGATIVE: 1
VOMITING: 0
COUGH: 0
SHORTNESS OF BREATH: 0

## 2018-07-31 NOTE — PATIENT INSTRUCTIONS
smoking, you improve the health of everyone who now breathes in your smoke. · Their heart, lung, and cancer risks drop, much like yours. · They are sick less. For babies and small children, living smoke-free means they're less likely to have ear infections, pneumonia, and bronchitis. · If you're a woman who is or will be pregnant someday, quitting smoking means a healthier . · Children who are close to you are less likely to become adult smokers. Where can you learn more? Go to https://[employer]Treepe[employer]. org and sign in to your [employer] account. Enter [phone] in the [facility] box to learn more about \"Learning About Benefits From Quitting Smoking. \"     If you do not have an account, please click on the \"Sign Up Now\" link. Current as of: 2017  Content Version: 11.6  © 2958-9013 [employer], Incorporated. Care instructions adapted under license by [facility] ([facility]). If you have questions about a medical condition or this instruction, always ask your healthcare professional. [address] any warranty or liability for your use of this information.

## 2018-07-31 NOTE — PROGRESS NOTES
[facility]    Subjective:      Patient ID: Juan Antonio Duggan is a 35 y.o. y.o. female. HPI Patient in for office to discuss getting back on depression medication. She states that she continues to take Wellbutrin and Buspar. She states that she is out of Lexapro. She states that she has been out of the that for a couple of weeks. She states that [facility] has been giving her the medications but now they wont take her insurance anymore. She would also like to talk about some left hand pain. She complains of numbness in her hands at times. She states that she is right handed. She state states that she is losing  strength. She states that she used to be a  and used her left hand for this. She states that her right wrist is painful but does not have the numbness and tingling. She tried to switch hands when she works depending on which one hurts more. She would also like refills on all of her allergy and asthma medications. She states that she continues to smoke as well. She states that she is now vaping more than she is using cigarettes at this time.    Past Medical History:   Diagnosis Date    Allergic rhinitis     Anemia     Asthma     Depression     Headache     Shortness of breath     Tobacco abuse        Past Surgical History:   Procedure Laterality Date     SECTION      TONSILLECTOMY AND ADENOIDECTOMY      TUBAL LIGATION      TYMPANOSTOMY TUBE PLACEMENT         Family History   Problem Relation Age of Onset    Heart Disease Mother     Other Mother         respiratory illness    Kidney Disease Mother     Early Death Mother 45        cardiac arrest    Heart Disease Father     High Blood Pressure Father     Other Father         liver/intestinal    Lung Cancer Maternal Grandmother     Diabetes Other     Early Death Other     Other Other         respiratory illness    Early Death Other     Other Other         respiratory illness    nausea and vomiting. Endocrine: Negative. Genitourinary: Negative for difficulty urinating and dysuria. Musculoskeletal: Positive for myalgias (left numbness and tingling, wakes her up at night). Skin: Negative. Allergic/Immunologic: Negative. Neurological: Negative for dizziness, light-headedness and headaches. Hematological: Negative. Psychiatric/Behavioral: Negative. Objective:      /70 (Site: Right Arm, Position: Sitting, Cuff Size: Medium Adult)   Pulse 56   Temp 97.4 °F (36.3 °C) (Tympanic)   Resp 12   Ht 5' 4.02\" (1.626 m)   Wt 214 lb 6.4 oz (97.3 kg)   LMP 07/13/2018 (Exact Date)   SpO2 99%   Breastfeeding? No   BMI 36.78 kg/m²     Physical Exam   Constitutional: She is oriented to person, place, and time. She appears well-developed and well-nourished. HENT:   Head: Normocephalic and atraumatic. Right Ear: External ear normal.   Left Ear: External ear normal.   Nose: Nose normal.   Eyes: Conjunctivae and EOM are normal. Pupils are equal, round, and reactive to light. Neck: Normal range of motion. Cardiovascular: Normal rate, regular rhythm and normal heart sounds. Pulmonary/Chest: Effort normal and breath sounds normal.   Abdominal: Soft. Musculoskeletal: Normal range of motion. Right wrist: She exhibits tenderness. Left hand: She exhibits tenderness. Decreased sensation noted. Decreased strength noted. Hands:  Neurological: She is alert and oriented to person, place, and time. Skin: Skin is warm and dry. Psychiatric: She has a normal mood and affect. Her behavior is normal. Judgment and thought content normal.         Assessment & Plan:      1. Tobacco abuse-chronic  Cont. cessation and medication  - buPROPion (WELLBUTRIN XL) 300 MG extended release tablet; Take 1 tablet by mouth every morning  Dispense: 30 tablet; Refill: 3    2.  Moderate episode of recurrent major depressive disorder (HCC)-chronic stable on

## 2018-08-20 DIAGNOSIS — M54.6 CHRONIC LEFT-SIDED THORACIC BACK PAIN: ICD-10-CM

## 2018-08-20 DIAGNOSIS — M62.838 TRAPEZIUS MUSCLE SPASM: ICD-10-CM

## 2018-08-20 DIAGNOSIS — G89.29 CHRONIC LEFT-SIDED THORACIC BACK PAIN: ICD-10-CM

## 2018-08-20 RX ORDER — NAPROXEN SODIUM 550 MG/1
550 TABLET ORAL 2 TIMES DAILY WITH MEALS
Qty: 60 TABLET | Refills: 5 | Status: SHIPPED | OUTPATIENT
Start: 2018-08-20 | End: 2018-12-18 | Stop reason: SDUPTHER

## 2018-08-20 RX ORDER — TIZANIDINE 4 MG/1
4 TABLET ORAL EVERY 8 HOURS PRN
Qty: 20 TABLET | Refills: 0 | Status: SHIPPED | OUTPATIENT
Start: 2018-08-20 | End: 2018-12-18 | Stop reason: SDUPTHER

## 2018-09-14 ENCOUNTER — OFFICE VISIT (OUTPATIENT)
Dept: PRIMARY CARE CLINIC | Age: 33
End: 2018-09-14
Payer: COMMERCIAL

## 2018-09-14 VITALS
BODY MASS INDEX: 35.85 KG/M2 | SYSTOLIC BLOOD PRESSURE: 128 MMHG | HEART RATE: 83 BPM | DIASTOLIC BLOOD PRESSURE: 80 MMHG | TEMPERATURE: 98.4 F | OXYGEN SATURATION: 97 % | WEIGHT: 210 LBS | HEIGHT: 64 IN

## 2018-09-14 DIAGNOSIS — H66.002 ACUTE SUPPURATIVE OTITIS MEDIA OF LEFT EAR WITHOUT SPONTANEOUS RUPTURE OF TYMPANIC MEMBRANE, RECURRENCE NOT SPECIFIED: ICD-10-CM

## 2018-09-14 DIAGNOSIS — K52.9 GASTROENTERITIS: Primary | ICD-10-CM

## 2018-09-14 DIAGNOSIS — H61.22 HEARING LOSS OF LEFT EAR DUE TO CERUMEN IMPACTION: ICD-10-CM

## 2018-09-14 PROCEDURE — 99213 OFFICE O/P EST LOW 20 MIN: CPT | Performed by: PHYSICIAN ASSISTANT

## 2018-09-14 PROCEDURE — 69210 REMOVE IMPACTED EAR WAX UNI: CPT | Performed by: PHYSICIAN ASSISTANT

## 2018-09-14 PROCEDURE — 96372 THER/PROPH/DIAG INJ SC/IM: CPT | Performed by: PHYSICIAN ASSISTANT

## 2018-09-14 RX ORDER — ONDANSETRON 4 MG/1
4 TABLET, ORALLY DISINTEGRATING ORAL EVERY 8 HOURS PRN
Qty: 15 TABLET | Refills: 0 | Status: SHIPPED | OUTPATIENT
Start: 2018-09-14 | End: 2018-12-26

## 2018-09-14 RX ORDER — CLONIDINE HYDROCHLORIDE 0.1 MG/1
0.1 TABLET ORAL NIGHTLY
COMMUNITY
End: 2018-12-26 | Stop reason: ALTCHOICE

## 2018-09-14 RX ORDER — PROMETHAZINE HYDROCHLORIDE 25 MG/ML
25 INJECTION, SOLUTION INTRAMUSCULAR; INTRAVENOUS ONCE
Status: COMPLETED | OUTPATIENT
Start: 2018-09-14 | End: 2018-09-14

## 2018-09-14 RX ORDER — AMOXICILLIN 500 MG/1
500 CAPSULE ORAL 3 TIMES DAILY
Qty: 30 CAPSULE | Refills: 0 | Status: SHIPPED | OUTPATIENT
Start: 2018-09-14 | End: 2018-09-24

## 2018-09-14 RX ORDER — FLUOXETINE HYDROCHLORIDE 20 MG/1
20 CAPSULE ORAL DAILY
COMMUNITY
End: 2018-12-26 | Stop reason: ALTCHOICE

## 2018-09-14 RX ORDER — HYDROXYZINE PAMOATE 50 MG/1
100 CAPSULE ORAL 3 TIMES DAILY PRN
COMMUNITY

## 2018-09-14 RX ADMIN — PROMETHAZINE HYDROCHLORIDE 25 MG: 25 INJECTION, SOLUTION INTRAMUSCULAR; INTRAVENOUS at 21:14

## 2018-09-14 ASSESSMENT — ENCOUNTER SYMPTOMS
DIARRHEA: 1
ABDOMINAL PAIN: 0
COUGH: 0
VOMITING: 1

## 2018-09-15 NOTE — PROGRESS NOTES
Subjective:      Patient ID: Pascale Echols is a 35 y.o. female. Emesis    This is a new problem. The current episode started today. The problem occurs 5 to 10 times per day. The problem has been unchanged. There has been no fever. Associated symptoms include diarrhea, dizziness, a fever and sweats. Pertinent negatives include no abdominal pain, chills or coughing. Risk factors include ill contacts. She has tried acetaminophen for the symptoms. Review of Systems   Constitutional: Positive for fever. Negative for chills. Respiratory: Negative for cough. Gastrointestinal: Positive for diarrhea and vomiting. Negative for abdominal pain. Neurological: Positive for dizziness. Objective:   Physical Exam   Constitutional: She is oriented to person, place, and time. She appears well-developed. HENT:   Head: Normocephalic. Right Ear: External ear normal.   Left Ear: Tympanic membrane is erythematous. Left canal obstructed with cerumen   Eyes: Pupils are equal, round, and reactive to light. Cardiovascular: Normal rate and regular rhythm. Pulmonary/Chest: Effort normal and breath sounds normal.   Abdominal: Soft. She exhibits no distension. There is no tenderness. Neurological: She is alert and oriented to person, place, and time. Skin: Skin is warm and dry. Psychiatric: She has a normal mood and affect. Assessment:      1. Gastroenteritis    2. Hearing loss of left ear due to cerumen impaction    3. Acute suppurative otitis media of left ear without spontaneous rupture of tympanic membrane, recurrence not specified          Plan:      Cerumen removed with irrigation. Patient tolerated well. Phenergan IM x 1. Zofran ODT PRN N/V. Push fluids. BRATTY diet and slowly increase as tolerated. Amoxil 500 mg tid x 10 days. Follow-up PRN/as planned with PCP.         DRAKE Hubbard

## 2018-09-17 ENCOUNTER — OFFICE VISIT (OUTPATIENT)
Dept: PRIMARY CARE CLINIC | Age: 33
End: 2018-09-17
Payer: COMMERCIAL

## 2018-09-17 VITALS
BODY MASS INDEX: 36.97 KG/M2 | WEIGHT: 215.4 LBS | HEART RATE: 74 BPM | TEMPERATURE: 98 F | SYSTOLIC BLOOD PRESSURE: 112 MMHG | DIASTOLIC BLOOD PRESSURE: 74 MMHG | OXYGEN SATURATION: 98 %

## 2018-09-17 DIAGNOSIS — H66.002 ACUTE SUPPURATIVE OTITIS MEDIA OF LEFT EAR WITHOUT SPONTANEOUS RUPTURE OF TYMPANIC MEMBRANE, RECURRENCE NOT SPECIFIED: ICD-10-CM

## 2018-09-17 DIAGNOSIS — H10.022 PINK EYE, LEFT: Primary | ICD-10-CM

## 2018-09-17 PROCEDURE — 99213 OFFICE O/P EST LOW 20 MIN: CPT | Performed by: NURSE PRACTITIONER

## 2018-09-17 RX ORDER — POLYMYXIN B SULFATE AND TRIMETHOPRIM 1; 10000 MG/ML; [USP'U]/ML
1 SOLUTION OPHTHALMIC EVERY 4 HOURS
Qty: 1 BOTTLE | Refills: 0 | Status: SHIPPED | OUTPATIENT
Start: 2018-09-17 | End: 2018-09-27

## 2018-09-17 ASSESSMENT — ENCOUNTER SYMPTOMS
COUGH: 1
EYE REDNESS: 1
BLURRED VISION: 1
EYE DISCHARGE: 1

## 2018-09-18 NOTE — PATIENT INSTRUCTIONS
Patient Education        some Mucinex D from the store. Not a prescription  Continue antibiotics as recommended  Eye drops     Ear Infection (Otitis Media): Care Instructions  Your Care Instructions    An ear infection may start with a cold and affect the middle ear (otitis media). It can hurt a lot. Most ear infections clear up on their own in a couple of days. Most often you will not need antibiotics. This is because many ear infections are caused by a virus. Antibiotics don't work against a virus. Regular doses of pain medicines are the best way to reduce your fever and help you feel better. Follow-up care is a key part of your treatment and safety. Be sure to make and go to all appointments, and call your doctor if you are having problems. It's also a good idea to know your test results and keep a list of the medicines you take. How can you care for yourself at home? · Take pain medicines exactly as directed. ¨ If the doctor gave you a prescription medicine for pain, take it as prescribed. ¨ If you are not taking a prescription pain medicine, take an over-the-counter medicine, such as acetaminophen (Tylenol), ibuprofen (Advil, Motrin), or naproxen (Aleve). Read and follow all instructions on the label. ¨ Do not take two or more pain medicines at the same time unless the doctor told you to. Many pain medicines have acetaminophen, which is Tylenol. Too much acetaminophen (Tylenol) can be harmful. · Plan to take a full dose of pain reliever before bedtime. Getting enough sleep will help you get better. · Try a warm, moist washcloth on the ear. It may help relieve pain. · If your doctor prescribed antibiotics, take them as directed. Do not stop taking them just because you feel better. You need to take the full course of antibiotics. When should you call for help?   Call your doctor now or seek immediate medical care if:    · You have new or increasing ear pain.     · You have new or increasing pus or blood draining from your ear.     · You have a fever with a stiff neck or a severe headache.    Watch closely for changes in your health, and be sure to contact your doctor if:    · You have new or worse symptoms.     · You are not getting better after taking an antibiotic for 2 days. Where can you learn more? Go to https://chpepiceweb.MovieLaLa. org and sign in to your PlaySquare account. Enter M080 in the [employer] box to learn more about \"Ear Infection (Otitis Media): Care Instructions. \"     If you do not have an account, please click on the \"Sign Up Now\" link. Current as of: May 12, 2017  Content Version: 11.7  © 4452-6074 [employer]. Care instructions adapted under license by [facility] ([facility]). If you have questions about a medical condition or this instruction, always ask your healthcare professional. [place] [address] any warranty or liability for your use of this information. Patient Education        Pinkeye: Care Instructions  Your Care Instructions    Pinkeye is redness and swelling of the eye surface and the conjunctiva (the lining of the eyelid and the covering of the white part of the eye). Pinkeye is also called conjunctivitis. Pinkeye is often caused by infection with bacteria or a virus. Dry air, allergies, smoke, and chemicals are other common causes. Pinkeye often clears on its own in 7 to 10 days. Antibiotics only help if the pinkeye is caused by bacteria. Pinkeye caused by infection spreads easily. If an allergy or chemical is causing pinkeye, it will not go away unless you can avoid whatever is causing it. Follow-up care is a key part of your treatment and safety. Be sure to make and go to all appointments, and call your doctor if you are having problems. It's also a good idea to know your test results and keep a list of the medicines you take. How can you care for yourself at home? · Wash your hands often.  Always wash them before If you do not have an account, please click on the \"Sign Up Now\" link. Current as of: November 20, 2017  Content Version: 11.7  © 1126-4502 [employer]. Care instructions adapted under license by [facility] ([facility]). If you have questions about a medical condition or this instruction, always ask your healthcare professional. [address] any warranty or liability for your use of this information.

## 2018-09-18 NOTE — PROGRESS NOTES
Jean Marie Booth  18    Chief Complaint   Patient presents with   Jeanie Campoverde     left ear, treated 2018 now eye is draining and red        HPI:  Presents today with a one day history of left red draining eye, itchy and swollen. Pink eye going around at work. Was here on  and diagnosed with an ear infection and has been on Amoxicillin. Her ear is still painful. She has taken dayquil and helps minimally. Past Med Hx:     Past Medical History:   Diagnosis Date    Allergic rhinitis     Anemia     Asthma     Depression     Headache     Shortness of breath     Tobacco abuse         Past Surgical History:   Procedure Laterality Date     SECTION      TONSILLECTOMY AND ADENOIDECTOMY      TUBAL LIGATION      TYMPANOSTOMY TUBE PLACEMENT         Social Hx:     Social History     Social History    Marital status: Single     Spouse name: N/A    Number of children: N/A    Years of education: N/A     Occupational History    Not on file.      Social History Main Topics    Smoking status: Current Every Day Smoker     Packs/day: 0.50     Years: 10.00     Types: E-Cigarettes, Cigarettes    Smokeless tobacco: Never Used      Comment: vapes more than smokes now    Alcohol use No    Drug use: No    Sexual activity: Yes     Partners: Male     Birth control/ protection: Surgical     Other Topics Concern    Not on file     Social History Narrative    No narrative on file       Lab Results   Component Value Date    LABA1C 4.6 (L) 2017     Lab Results   Component Value Date    EAG 85 2017     Lab Results   Component Value Date    WBC 9.1 2017    HGB 14.3 2017    HCT 42.9 2017    MCV 90.3 2017     2017     Lab Results   Component Value Date     2017    K 4.1 2017     2017    CO2 24 2017    BUN 19 2017    CREATININE 0.59 2017    GLUCOSE 92 2017    CALCIUM 8.8 2017

## 2018-10-28 ENCOUNTER — APPOINTMENT (OUTPATIENT)
Dept: GENERAL RADIOLOGY | Age: 33
End: 2018-10-28
Payer: COMMERCIAL

## 2018-10-28 ENCOUNTER — HOSPITAL ENCOUNTER (EMERGENCY)
Age: 33
Discharge: HOME | End: 2018-10-28
Attending: EMERGENCY MEDICINE
Payer: COMMERCIAL

## 2018-10-28 VITALS
OXYGEN SATURATION: 98 % | RESPIRATION RATE: 12 BRPM | SYSTOLIC BLOOD PRESSURE: 141 MMHG | DIASTOLIC BLOOD PRESSURE: 73 MMHG | TEMPERATURE: 98.8 F | BODY MASS INDEX: 36.9 KG/M2 | HEART RATE: 108 BPM | WEIGHT: 215 LBS

## 2018-10-28 DIAGNOSIS — S46.912A STRAIN OF LEFT SHOULDER, INITIAL ENCOUNTER: Primary | ICD-10-CM

## 2018-10-28 DIAGNOSIS — S70.10XA CONTUSION OF THIGH, UNSPECIFIED LATERALITY, INITIAL ENCOUNTER: ICD-10-CM

## 2018-10-28 PROCEDURE — 73552 X-RAY EXAM OF FEMUR 2/>: CPT

## 2018-10-28 PROCEDURE — 6360000002 HC RX W HCPCS

## 2018-10-28 PROCEDURE — 99284 EMERGENCY DEPT VISIT MOD MDM: CPT

## 2018-10-28 PROCEDURE — 73030 X-RAY EXAM OF SHOULDER: CPT

## 2018-10-28 PROCEDURE — 73060 X-RAY EXAM OF HUMERUS: CPT

## 2018-10-28 PROCEDURE — 96372 THER/PROPH/DIAG INJ SC/IM: CPT

## 2018-10-28 RX ORDER — LORAZEPAM 2 MG/ML
INJECTION INTRAMUSCULAR
Status: COMPLETED
Start: 2018-10-28 | End: 2018-10-28

## 2018-10-28 RX ORDER — LORAZEPAM 2 MG/ML
1 INJECTION INTRAMUSCULAR ONCE
Status: COMPLETED | OUTPATIENT
Start: 2018-10-28 | End: 2018-10-28

## 2018-10-28 RX ADMIN — LORAZEPAM 1 MG: 2 INJECTION INTRAMUSCULAR at 12:47

## 2018-10-28 RX ADMIN — LORAZEPAM 1 MG: 2 INJECTION INTRAMUSCULAR; INTRAVENOUS at 12:47

## 2018-10-28 ASSESSMENT — PAIN DESCRIPTION - PROGRESSION: CLINICAL_PROGRESSION: NOT CHANGED

## 2018-10-28 ASSESSMENT — PAIN DESCRIPTION - FREQUENCY: FREQUENCY: CONTINUOUS

## 2018-10-28 ASSESSMENT — PAIN DESCRIPTION - PAIN TYPE: TYPE: ACUTE PAIN

## 2018-10-28 ASSESSMENT — PAIN DESCRIPTION - DESCRIPTORS: DESCRIPTORS: ACHING

## 2018-10-28 ASSESSMENT — PAIN DESCRIPTION - ORIENTATION: ORIENTATION: LEFT

## 2018-10-28 ASSESSMENT — PAIN DESCRIPTION - LOCATION: LOCATION: ARM

## 2018-10-28 ASSESSMENT — PAIN SCALES - GENERAL
PAINLEVEL_OUTOF10: 7
PAINLEVEL_OUTOF10: 3

## 2018-10-28 ASSESSMENT — PAIN DESCRIPTION - ONSET: ONSET: SUDDEN

## 2018-10-28 NOTE — ED PROVIDER NOTES
Disease in her mother; Jarret Pearson in her maternal grandmother; Other in her father, mother, and other family members; Thyroid Disease in an other family member. SOCIAL HISTORY      reports that she has been smoking E-Cigarettes and Cigarettes. She has a 5.00 pack-year smoking history. She has never used smokeless tobacco. She reports that she does not drink alcohol or use drugs. PHYSICAL EXAM     INITIAL VITALS:  weight is 215 lb (97.5 kg). Her tympanic temperature is 98.8 °F (37.1 °C). Her blood pressure is 141/73 (abnormal) and her pulse is 108. Her respiration is 12 and oxygen saturation is 98%. Constitutional: The patient is alert, well-developed, in no acute distress. Vital signs as noted. Eyes: Pupils equal and reactive to light. EOMI. Slight ecchymosis noted of the upper and lower eyelids bilaterally. No pain to palpation of the orbits. Ears, nose, and throat: Oropharynx clear without masses, lesions or deformities. Neck: No masses, trachea midline. Good range of motion without pain  Rest of the back exam is normal  Pain to palpation of the left shoulder and left arm. Good range of motion without distal neurovascular compromise noted. Fingers and thumb are normal bilaterally. The right thigh midportion appears tender to palpation with good range of motion of the hip and the knee. No distal neurovascular compromise in the right leg. DIFFERENTIAL DIAGNOSIS/ MEDICAL DECISION MAKING:      X-rays read as normal by the radiologist    [facility] Released to the custody of the police    Motrin rest and ice as directed    Follow Exit Care instructions closely. I have reviewed the disposition diagnosis with the patient and or their family/guardian. I have answered their questions and given discharge instructions. They voiced understanding of these instructions and did not have any further questions or complaints.     DIAGNOSTIC RESULTS     RADIOLOGY:   Non-plain film images such as CT, Ultrasound and MRI are read by the radiologist. Plain radiographic images are visualized and preliminarily interpreted by the emergency physician with the below findings:  XR SHOULDER LEFT (MIN 2 VIEWS)   Final Result   No acute osseous abnormality or malalignment identified. Interval development glenohumeral arthropathy and findings suggestive of   avascular necrosis or old osteochondral injury in the humeral head. XR FEMUR RIGHT (MIN 2 VIEWS)   Final Result   No acute osseous abnormality identified. XR HUMERUS LEFT (MIN 2 VIEWS)   Final Result   No acute osseous abnormality or malalignment identified. Moderate degenerative change in the glenohumeral joint. LABS:  No results found for this visit on 10/28/18. ABNORMAL LABS:  Labs Reviewed - No data to display           EMERGENCY DEPARTMENT COURSE:   Vitals:    Vitals:    10/28/18 1231   BP: (!) 141/73   Pulse: 108   Resp: 12   Temp: 98.8 °F (37.1 °C)   TempSrc: Tympanic   SpO2: 98%   Weight: 215 lb (97.5 kg)     -------------------------  BP: (!) 141/73, Temp: 98.8 °F (37.1 °C), Pulse: 108, Resp: 12    See DDX/MDM  (Differential Diagnosis/Medical Decision Making) above. FINAL IMPRESSION      1. Strain of left shoulder, initial encounter    2.  Contusion of thigh, unspecified laterality, initial encounter          DISPOSITION/PLAN   DISPOSITION Decision To Discharge 10/28/2018 01:25:01 PM      Condition on Disposition    Fair    PATIENT REFERRED TO:  Elizabeth Donohue MD  [address] [place] [address],[address] Selena Gonzales  [phone]    In 2 days        DISCHARGE MEDICATIONS:  Discharge Medication List as of 10/28/2018  1:26 PM          (Please note that portions of this note were completed with a voice recognition program.  Efforts were made to edit the dictations but occasionally words are mis-transcribed.)    Aaron Dunbar,   Attending Emergency Physician       [address] [place] [address],   10/28/18 4440

## 2018-11-13 ENCOUNTER — TELEPHONE (OUTPATIENT)
Dept: PRIMARY CARE CLINIC | Age: 33
End: 2018-11-13

## 2018-12-18 DIAGNOSIS — Z91.09 ENVIRONMENTAL ALLERGIES: ICD-10-CM

## 2018-12-18 DIAGNOSIS — J45.909 MODERATE ASTHMA, UNSPECIFIED WHETHER COMPLICATED, UNSPECIFIED WHETHER PERSISTENT: ICD-10-CM

## 2018-12-18 DIAGNOSIS — M54.6 CHRONIC LEFT-SIDED THORACIC BACK PAIN: ICD-10-CM

## 2018-12-18 DIAGNOSIS — M62.838 TRAPEZIUS MUSCLE SPASM: ICD-10-CM

## 2018-12-18 DIAGNOSIS — K52.9 GASTROENTERITIS: ICD-10-CM

## 2018-12-18 DIAGNOSIS — G89.29 CHRONIC LEFT-SIDED THORACIC BACK PAIN: ICD-10-CM

## 2018-12-18 RX ORDER — NAPROXEN SODIUM 550 MG/1
550 TABLET ORAL 2 TIMES DAILY WITH MEALS
Qty: 60 TABLET | Refills: 1 | Status: SHIPPED | OUTPATIENT
Start: 2018-12-18 | End: 2018-12-26

## 2018-12-18 RX ORDER — OMEPRAZOLE 20 MG/1
20 TABLET, DELAYED RELEASE ORAL DAILY
Qty: 30 TABLET | Refills: 1 | Status: SHIPPED | OUTPATIENT
Start: 2018-12-18 | End: 2019-04-02 | Stop reason: SDUPTHER

## 2018-12-18 RX ORDER — FLUTICASONE PROPIONATE 44 UG/1
2 AEROSOL, METERED RESPIRATORY (INHALATION) 2 TIMES DAILY
Qty: 1 INHALER | Refills: 3 | Status: SHIPPED | OUTPATIENT
Start: 2018-12-18 | End: 2019-04-02 | Stop reason: SDUPTHER

## 2018-12-18 RX ORDER — ONDANSETRON 4 MG/1
4 TABLET, ORALLY DISINTEGRATING ORAL EVERY 8 HOURS PRN
Qty: 15 TABLET | Refills: 0 | OUTPATIENT
Start: 2018-12-18

## 2018-12-18 RX ORDER — TIZANIDINE 4 MG/1
4 TABLET ORAL EVERY 8 HOURS PRN
Qty: 20 TABLET | Refills: 0 | Status: SHIPPED | OUTPATIENT
Start: 2018-12-18 | End: 2018-12-26

## 2018-12-18 RX ORDER — FLUTICASONE PROPIONATE 50 MCG
SPRAY, SUSPENSION (ML) NASAL
Qty: 16 G | Refills: 3 | Status: SHIPPED | OUTPATIENT
Start: 2018-12-18 | End: 2019-04-02 | Stop reason: SDUPTHER

## 2018-12-18 RX ORDER — FEXOFENADINE HCL 180 MG/1
180 TABLET ORAL DAILY
Qty: 30 TABLET | Refills: 3 | Status: SHIPPED | OUTPATIENT
Start: 2018-12-18 | End: 2019-04-02 | Stop reason: SDUPTHER

## 2018-12-18 RX ORDER — ONDANSETRON 4 MG/1
4 TABLET, ORALLY DISINTEGRATING ORAL EVERY 8 HOURS PRN
Qty: 30 TABLET | Refills: 0 | Status: CANCELLED | OUTPATIENT
Start: 2018-12-18

## 2018-12-18 NOTE — TELEPHONE ENCOUNTER
Last OV 9/4/18  Next OV 12/26/18  Verified In Epic 12/18/18  She had zofran which I removed cause she went and seen [facility] in 9/2018 and it was given- don't need refill  The flonase, flovent allegra, Proair, Hermiller gave her for Chronic allergies, - was seen 7/2018  Other meds are d/t be refilled

## 2018-12-18 NOTE — TELEPHONE ENCOUNTER
From: Azalea Brasher  Sent: 12/18/2018 11:31 AM EST  Subject: Medication Renewal Request    Azalea Brasher would like a refill of the following medications:     ondansetron (ZOFRAN ODT) 4 MG disintegrating tablet [DRAKE MEJIA]    Preferred pharmacy: [facility]         Medication renewals requested in this message routed separately:     fexofenadine (ALLEGRA ALLERGY) 180 MG tablet [Jennifer Trevizo, RAMON - CNP]     fluticasone (FLONASE) 50 MCG/ACT nasal spray [RAMON Vaughn - CNP]     fluticasone (FLOVENT HFA) 44 MCG/ACT inhaler [RAMON Vaughn - CNP]     albuterol sulfate (PROAIR RESPICLICK) 161 (90 Base) MCG/ACT aerosol powder inhalation [RAMON Vaughn - CNP]       omeprazole (PRILOSEC OTC) 20 MG tablet Grace Angel MD]     naproxen sodium (ANAPROX) 550 MG tablet Grace Angel MD]     tiZANidine (ZANAFLEX) 4 MG tablet Elma Shelton MD]

## 2018-12-26 ENCOUNTER — PATIENT MESSAGE (OUTPATIENT)
Dept: FAMILY MEDICINE CLINIC | Age: 33
End: 2018-12-26

## 2018-12-26 ENCOUNTER — OFFICE VISIT (OUTPATIENT)
Dept: FAMILY MEDICINE CLINIC | Age: 33
End: 2018-12-26
Payer: COMMERCIAL

## 2018-12-26 VITALS
OXYGEN SATURATION: 100 % | HEART RATE: 64 BPM | SYSTOLIC BLOOD PRESSURE: 102 MMHG | TEMPERATURE: 97.8 F | HEIGHT: 64 IN | BODY MASS INDEX: 41.11 KG/M2 | DIASTOLIC BLOOD PRESSURE: 64 MMHG | WEIGHT: 240.8 LBS

## 2018-12-26 DIAGNOSIS — M62.838 TRAPEZIUS MUSCLE SPASM: Primary | ICD-10-CM

## 2018-12-26 DIAGNOSIS — M25.512 ACUTE PAIN OF LEFT SHOULDER: ICD-10-CM

## 2018-12-26 PROCEDURE — G8427 DOCREV CUR MEDS BY ELIG CLIN: HCPCS | Performed by: FAMILY MEDICINE

## 2018-12-26 PROCEDURE — 4004F PT TOBACCO SCREEN RCVD TLK: CPT | Performed by: FAMILY MEDICINE

## 2018-12-26 PROCEDURE — 20552 NJX 1/MLT TRIGGER POINT 1/2: CPT | Performed by: FAMILY MEDICINE

## 2018-12-26 PROCEDURE — G8417 CALC BMI ABV UP PARAM F/U: HCPCS | Performed by: FAMILY MEDICINE

## 2018-12-26 PROCEDURE — 99214 OFFICE O/P EST MOD 30 MIN: CPT | Performed by: FAMILY MEDICINE

## 2018-12-26 PROCEDURE — G8484 FLU IMMUNIZE NO ADMIN: HCPCS | Performed by: FAMILY MEDICINE

## 2018-12-26 RX ORDER — LORAZEPAM 0.5 MG/1
0.5 TABLET ORAL 3 TIMES DAILY
COMMUNITY
Start: 2018-11-22 | End: 2020-06-14 | Stop reason: SDUPTHER

## 2018-12-26 RX ORDER — TRIAMCINOLONE ACETONIDE 40 MG/ML
20 INJECTION, SUSPENSION INTRA-ARTICULAR; INTRAMUSCULAR ONCE
Status: COMPLETED | OUTPATIENT
Start: 2018-12-26 | End: 2018-12-26

## 2018-12-26 RX ORDER — DIVALPROEX SODIUM 125 MG/1
125 TABLET, DELAYED RELEASE ORAL 2 TIMES DAILY
COMMUNITY
Start: 2018-11-22 | End: 2020-08-27

## 2018-12-26 RX ORDER — OLANZAPINE 20 MG/1
20 TABLET ORAL NIGHTLY
Refills: 0 | COMMUNITY
Start: 2018-10-05 | End: 2020-08-27

## 2018-12-26 RX ORDER — BACLOFEN 10 MG/1
10 TABLET ORAL NIGHTLY
Qty: 30 TABLET | Refills: 0 | Status: SHIPPED | OUTPATIENT
Start: 2018-12-26 | End: 2019-01-22 | Stop reason: SDUPTHER

## 2018-12-26 RX ORDER — MELOXICAM 7.5 MG/1
7.5 TABLET ORAL DAILY
Qty: 30 TABLET | Refills: 3 | Status: SHIPPED | OUTPATIENT
Start: 2018-12-26 | End: 2019-04-02 | Stop reason: SDUPTHER

## 2018-12-26 RX ORDER — NAPROXEN SODIUM 550 MG/1
550 TABLET ORAL 2 TIMES DAILY WITH MEALS
Qty: 60 TABLET | Refills: 1 | Status: SHIPPED | OUTPATIENT
Start: 2018-12-26 | End: 2019-03-19 | Stop reason: SDUPTHER

## 2018-12-26 RX ORDER — TIZANIDINE 4 MG/1
TABLET ORAL
Qty: 20 TABLET | Refills: 0 | OUTPATIENT
Start: 2018-12-26

## 2018-12-26 RX ADMIN — TRIAMCINOLONE ACETONIDE 20 MG: 40 INJECTION, SUSPENSION INTRA-ARTICULAR; INTRAMUSCULAR at 09:50

## 2018-12-26 NOTE — PROGRESS NOTES
Referral Type:   Eval and Treat     Referral Reason:   Specialty Services Required     Requested Specialty:   Physical Therapy     Number of Visits Requested:   1    [place] INJECT TRIGGER POINT, 1 OR 2     Orders Placed This Encounter   Medications    baclofen (LIORESAL) 10 MG tablet     Sig: Take 1 tablet by mouth nightly     Dispense:  30 tablet     Refill:  0    triamcinolone acetonide (KENALOG-40) injection 20 mg    meloxicam (MOBIC) 7.5 MG tablet     Sig: Take 1 tablet by mouth daily     Dispense:  30 tablet     Refill:  3       Patientgiven educational materials - see patient instructions. Discussed use, benefit,and side effects of prescribed medications. All patient questions answered. Ptvoiced understanding. Reviewed health maintenance. Instructed to continue currentmedications, diet and exercise. Patient agreed with treatment plan. Follow up asdirected.      Electronically signed by Daphney Salmeron MD on 12/26/2018 at 2:49 PM

## 2018-12-26 NOTE — PATIENT INSTRUCTIONS
questions about a medical condition or this instruction, always ask your healthcare professional. [address] any warranty or liability for your use of this information. Patient Education        Rhomboid Muscle Strain: Rehab Exercises  Your Care Instructions  Here are some examples of typical rehabilitation exercises for your condition. Start each exercise slowly. Ease off the exercise if you start to have pain. Your doctor or physical therapist will tell you when you can start these exercises and which ones will work best for you. How to do the exercises  Lower neck and upper back (rhomboid) stretch    1. Stretch your arms out in front of your body. Clasp one hand on top of your other hand. 2. Gently reach out so that you feel your shoulder blades stretching away from each other. 3. Gently bend your head forward. 4. Hold for 15 to 30 seconds. 5. Repeat 2 to 4 times. Resisted rows    1. Put the band around a solid object, such as a bedpost, at about waist level. Stand facing where you have placed the band. Hold equal lengths of the band in each hand. 2. Start with your arms held out in front of you. 3. Pull the bands back, and move your shoulder blades together. As you finish, your elbows should be at your side and bent at 90 degrees (like the angle of the letter \"L\"). 4. Return to the starting position. 5. Repeat 8 to 12 times. Neck stretches    1. Look straight ahead, and tip your right ear to your right shoulder. Do not let your left shoulder rise as you tip your head to the right. 2. Hold for 15 to 30 seconds. 3. Tilt your head to the left. Do not let your right shoulder rise as you tip your head to the left. 4. Hold for 15 to 30 seconds. 5. Repeat 2 to 4 times to each side. Neck rotation    1. Sit in a firm chair, or stand up straight. 2. Keeping your chin level, turn your head to the right, and hold for 15 to 30 seconds.   3. Turn your head to the left, and hold for 15 to 30 seconds. 4. Repeat 2 to 4 times to each side. Follow-up care is a key part of your treatment and safety. Be sure to make and go to all appointments, and call your doctor if you are having problems. It's also a good idea to know your test results and keep a list of the medicines you take. Where can you learn more? Go to https://[employer]u [employer].ePig Games. org and sign in to your [employer] Pepper account. Enter A991 in the [employer] box to learn more about \"Rhomboid Muscle Strain: Rehab Exercises. \"     If you do not have an account, please click on the \"Sign Up Now\" link. Current as of: November 29, 2017  Content Version: 11.8  © 2537-6996 Healthwise, Incorporated. Care instructions adapted under license by [facility] ([facility]). If you have questions about a medical condition or this instruction, always ask your healthcare professional. [place] [address] any warranty or liability for your use of this information.

## 2018-12-27 ENCOUNTER — PATIENT MESSAGE (OUTPATIENT)
Dept: FAMILY MEDICINE CLINIC | Age: 33
End: 2018-12-27

## 2018-12-27 DIAGNOSIS — G89.29 CHRONIC LEFT SHOULDER PAIN: Primary | ICD-10-CM

## 2018-12-27 DIAGNOSIS — M25.512 CHRONIC LEFT SHOULDER PAIN: Primary | ICD-10-CM

## 2018-12-27 DIAGNOSIS — Z91.09 ENVIRONMENTAL ALLERGIES: ICD-10-CM

## 2018-12-28 RX ORDER — GABAPENTIN 100 MG/1
100 CAPSULE ORAL 3 TIMES DAILY
Qty: 90 CAPSULE | Refills: 1 | Status: SHIPPED | OUTPATIENT
Start: 2018-12-28 | End: 2019-01-10 | Stop reason: SDUPTHER

## 2018-12-28 RX ORDER — FEXOFENADINE HYDROCHLORIDE 180 MG/1
180 TABLET, FILM COATED ORAL DAILY
Qty: 30 TABLET | Refills: 0 | OUTPATIENT
Start: 2018-12-28

## 2019-01-03 RX ORDER — ALBUTEROL SULFATE 90 UG/1
2 AEROSOL, METERED RESPIRATORY (INHALATION) EVERY 4 HOURS PRN
Qty: 1 INHALER | Refills: 3 | Status: SHIPPED | OUTPATIENT
Start: 2019-01-03 | End: 2019-04-02 | Stop reason: SDUPTHER

## 2019-01-09 ENCOUNTER — NURSE ONLY (OUTPATIENT)
Dept: LAB | Age: 34
End: 2019-01-09
Payer: COMMERCIAL

## 2019-01-09 ENCOUNTER — PATIENT MESSAGE (OUTPATIENT)
Dept: FAMILY MEDICINE CLINIC | Age: 34
End: 2019-01-09

## 2019-01-09 DIAGNOSIS — M25.512 CHRONIC LEFT SHOULDER PAIN: ICD-10-CM

## 2019-01-09 DIAGNOSIS — Z23 NEED FOR VACCINATION: Primary | ICD-10-CM

## 2019-01-09 DIAGNOSIS — G89.29 CHRONIC LEFT SHOULDER PAIN: ICD-10-CM

## 2019-01-09 PROCEDURE — 90471 IMMUNIZATION ADMIN: CPT | Performed by: FAMILY MEDICINE

## 2019-01-09 PROCEDURE — 90686 IIV4 VACC NO PRSV 0.5 ML IM: CPT | Performed by: FAMILY MEDICINE

## 2019-01-10 RX ORDER — GABAPENTIN 300 MG/1
300 CAPSULE ORAL 3 TIMES DAILY
Qty: 90 CAPSULE | Refills: 1 | Status: SHIPPED | OUTPATIENT
Start: 2019-01-10 | End: 2019-02-08

## 2019-01-22 RX ORDER — BACLOFEN 10 MG/1
10 TABLET ORAL NIGHTLY
Qty: 30 TABLET | Refills: 3 | Status: SHIPPED | OUTPATIENT
Start: 2019-01-22 | End: 2019-04-02 | Stop reason: SDUPTHER

## 2019-02-04 ENCOUNTER — PATIENT MESSAGE (OUTPATIENT)
Dept: FAMILY MEDICINE CLINIC | Age: 34
End: 2019-02-04

## 2019-02-04 DIAGNOSIS — M25.512 CHRONIC LEFT SHOULDER PAIN: Primary | ICD-10-CM

## 2019-02-04 DIAGNOSIS — G89.29 CHRONIC LEFT SHOULDER PAIN: Primary | ICD-10-CM

## 2019-02-04 RX ORDER — PREGABALIN 75 MG/1
75 CAPSULE ORAL 2 TIMES DAILY
Qty: 60 CAPSULE | Refills: 0 | Status: SHIPPED | OUTPATIENT
Start: 2019-02-09 | End: 2019-02-08 | Stop reason: CLARIF

## 2019-02-04 RX ORDER — PREGABALIN 75 MG/1
75 CAPSULE ORAL 2 TIMES DAILY
Qty: 60 CAPSULE | Refills: 0 | Status: SHIPPED | OUTPATIENT
Start: 2019-02-04 | End: 2019-02-04 | Stop reason: CLARIF

## 2019-02-05 ENCOUNTER — PATIENT MESSAGE (OUTPATIENT)
Dept: FAMILY MEDICINE CLINIC | Age: 34
End: 2019-02-05

## 2019-02-08 RX ORDER — GABAPENTIN 400 MG/1
400 CAPSULE ORAL 3 TIMES DAILY
Qty: 90 CAPSULE | Refills: 1 | Status: SHIPPED | OUTPATIENT
Start: 2019-02-08 | End: 2019-04-02 | Stop reason: SDUPTHER

## 2019-02-26 ENCOUNTER — HOSPITAL ENCOUNTER (OUTPATIENT)
Dept: PHYSICAL THERAPY | Age: 34
Setting detail: THERAPIES SERIES
Discharge: HOME | End: 2019-02-26
Payer: COMMERCIAL

## 2019-03-04 ENCOUNTER — HOSPITAL ENCOUNTER (OUTPATIENT)
Dept: PHYSICAL THERAPY | Age: 34
Setting detail: THERAPIES SERIES
Discharge: HOME | End: 2019-03-04
Payer: COMMERCIAL

## 2019-03-06 ENCOUNTER — HOSPITAL ENCOUNTER (OUTPATIENT)
Dept: PHYSICAL THERAPY | Age: 34
Setting detail: THERAPIES SERIES
Discharge: HOME | End: 2019-03-06
Payer: COMMERCIAL

## 2019-03-06 PROCEDURE — 97162 PT EVAL MOD COMPLEX 30 MIN: CPT | Performed by: PHYSICAL THERAPIST

## 2019-03-06 PROCEDURE — 97110 THERAPEUTIC EXERCISES: CPT | Performed by: PHYSICAL THERAPIST

## 2019-03-06 ASSESSMENT — PAIN DESCRIPTION - ONSET: ONSET: ON-GOING

## 2019-03-06 ASSESSMENT — PAIN DESCRIPTION - LOCATION: LOCATION: NECK;SHOULDER

## 2019-03-06 ASSESSMENT — PAIN DESCRIPTION - FREQUENCY: FREQUENCY: CONTINUOUS

## 2019-03-06 ASSESSMENT — PAIN - FUNCTIONAL ASSESSMENT: PAIN_FUNCTIONAL_ASSESSMENT: PREVENTS OR INTERFERES WITH MANY ACTIVE NOT PASSIVE ACTIVITIES

## 2019-03-06 ASSESSMENT — PAIN SCALES - GENERAL: PAINLEVEL_OUTOF10: 8

## 2019-03-06 ASSESSMENT — PAIN DESCRIPTION - ORIENTATION: ORIENTATION: LEFT;POSTERIOR;OUTER

## 2019-03-06 ASSESSMENT — PAIN DESCRIPTION - PAIN TYPE: TYPE: CHRONIC PAIN

## 2019-03-06 ASSESSMENT — PAIN DESCRIPTION - PROGRESSION: CLINICAL_PROGRESSION: GRADUALLY WORSENING

## 2019-03-19 RX ORDER — NAPROXEN SODIUM 550 MG/1
TABLET ORAL
Qty: 60 TABLET | Refills: 0 | Status: SHIPPED | OUTPATIENT
Start: 2019-03-19 | End: 2019-05-23 | Stop reason: SDUPTHER

## 2019-03-29 ENCOUNTER — HOSPITAL ENCOUNTER (OUTPATIENT)
Dept: PHYSICAL THERAPY | Age: 34
Setting detail: THERAPIES SERIES
Discharge: HOME | End: 2019-03-29
Payer: COMMERCIAL

## 2019-04-01 ENCOUNTER — TELEPHONE (OUTPATIENT)
Dept: FAMILY MEDICINE CLINIC | Age: 34
End: 2019-04-01

## 2019-04-01 DIAGNOSIS — G89.29 CHRONIC LEFT SHOULDER PAIN: ICD-10-CM

## 2019-04-01 DIAGNOSIS — M25.512 CHRONIC LEFT SHOULDER PAIN: ICD-10-CM

## 2019-04-01 DIAGNOSIS — M51.26 HERNIATED LUMBAR INTERVERTEBRAL DISC: Primary | ICD-10-CM

## 2019-04-01 RX ORDER — PREGABALIN 75 MG/1
75 CAPSULE ORAL 2 TIMES DAILY
Qty: 60 CAPSULE | Refills: 0 | Status: SHIPPED | OUTPATIENT
Start: 2019-04-01 | End: 2019-04-01

## 2019-04-02 DIAGNOSIS — Z91.09 ENVIRONMENTAL ALLERGIES: ICD-10-CM

## 2019-04-02 DIAGNOSIS — J45.909 MODERATE ASTHMA, UNSPECIFIED WHETHER COMPLICATED, UNSPECIFIED WHETHER PERSISTENT: ICD-10-CM

## 2019-04-02 RX ORDER — FLUTICASONE PROPIONATE 50 MCG
SPRAY, SUSPENSION (ML) NASAL
Qty: 16 G | Refills: 3 | Status: SHIPPED | OUTPATIENT
Start: 2019-04-02 | End: 2019-07-31 | Stop reason: SDUPTHER

## 2019-04-02 RX ORDER — FLUTICASONE PROPIONATE 44 UG/1
2 AEROSOL, METERED RESPIRATORY (INHALATION) 2 TIMES DAILY
Qty: 1 INHALER | Refills: 3 | Status: SHIPPED | OUTPATIENT
Start: 2019-04-02 | End: 2019-07-31 | Stop reason: SDUPTHER

## 2019-04-02 RX ORDER — MELOXICAM 7.5 MG/1
7.5 TABLET ORAL DAILY
Qty: 30 TABLET | Refills: 3 | Status: SHIPPED | OUTPATIENT
Start: 2019-04-02 | End: 2019-07-31 | Stop reason: SDUPTHER

## 2019-04-02 RX ORDER — ALBUTEROL SULFATE 90 UG/1
2 AEROSOL, METERED RESPIRATORY (INHALATION) EVERY 4 HOURS PRN
Qty: 1 INHALER | Refills: 3 | Status: SHIPPED | OUTPATIENT
Start: 2019-04-02 | End: 2019-07-31 | Stop reason: SDUPTHER

## 2019-04-02 RX ORDER — GABAPENTIN 400 MG/1
400 CAPSULE ORAL 3 TIMES DAILY
Qty: 90 CAPSULE | Refills: 1 | Status: SHIPPED | OUTPATIENT
Start: 2019-04-02 | End: 2019-05-23 | Stop reason: SDUPTHER

## 2019-04-02 RX ORDER — FEXOFENADINE HCL 180 MG/1
180 TABLET ORAL DAILY
Qty: 30 TABLET | Refills: 3 | Status: SHIPPED | OUTPATIENT
Start: 2019-04-02 | End: 2019-07-31 | Stop reason: SDUPTHER

## 2019-04-02 RX ORDER — OMEPRAZOLE 20 MG/1
20 TABLET, DELAYED RELEASE ORAL DAILY
Qty: 30 TABLET | Refills: 1 | Status: SHIPPED | OUTPATIENT
Start: 2019-04-02 | End: 2019-07-31 | Stop reason: SDUPTHER

## 2019-04-02 RX ORDER — BACLOFEN 10 MG/1
10 TABLET ORAL NIGHTLY
Qty: 30 TABLET | Refills: 3 | Status: SHIPPED | OUTPATIENT
Start: 2019-04-02 | End: 2019-07-31 | Stop reason: SDUPTHER

## 2019-04-02 NOTE — TELEPHONE ENCOUNTER
There aren't a whole lot of other options for her for pain. If the mobic is helping a little I can increase it for her.  She and I can also talk about this more at her appointment

## 2019-04-02 NOTE — TELEPHONE ENCOUNTER
Last Appt:  12/26/2018  Next Appt:   4/15/2019  Med verified in [facility] 4/2/19

## 2019-04-03 DIAGNOSIS — M62.838 TRAPEZIUS MUSCLE SPASM: Primary | ICD-10-CM

## 2019-04-03 DIAGNOSIS — M25.512 ACUTE PAIN OF LEFT SHOULDER: ICD-10-CM

## 2019-05-23 ENCOUNTER — PATIENT MESSAGE (OUTPATIENT)
Dept: FAMILY MEDICINE CLINIC | Age: 34
End: 2019-05-23

## 2019-05-23 RX ORDER — GABAPENTIN 400 MG/1
400 CAPSULE ORAL 3 TIMES DAILY
Qty: 90 CAPSULE | Refills: 1 | Status: SHIPPED | OUTPATIENT
Start: 2019-05-23 | End: 2019-07-31 | Stop reason: SDUPTHER

## 2019-05-23 NOTE — TELEPHONE ENCOUNTER
From: Ryan Mcmahan  To: Supriya Lambert MD  Sent: 5/23/2019 1:56 AM EDT  Subject: Visit Hilario Walker.. I wanted to write you and tell you I'm sorry for missing my appointment we were still having car issues at the time and it slipped my mind . .now the car is fixed but I have to leave to go to [place] for a month to help my family. my grandma passed away.  I will schedule an appointment as soon as I get back and come see u please refill my medications so I have them during this time thank you

## 2019-05-24 RX ORDER — NAPROXEN SODIUM 550 MG/1
TABLET ORAL
Qty: 60 TABLET | Refills: 0 | Status: SHIPPED | OUTPATIENT
Start: 2019-05-24 | End: 2019-07-31 | Stop reason: SDUPTHER

## 2019-09-24 RX ORDER — NAPROXEN SODIUM 550 MG/1
TABLET ORAL
Qty: 60 TABLET | Refills: 0 | OUTPATIENT
Start: 2019-09-24

## 2019-10-22 DIAGNOSIS — J45.909 MODERATE ASTHMA, UNSPECIFIED WHETHER COMPLICATED, UNSPECIFIED WHETHER PERSISTENT: ICD-10-CM

## 2019-10-22 DIAGNOSIS — Z91.09 ENVIRONMENTAL ALLERGIES: ICD-10-CM

## 2019-10-22 RX ORDER — MELOXICAM 7.5 MG/1
7.5 TABLET ORAL DAILY
Qty: 30 TABLET | Refills: 3 | OUTPATIENT
Start: 2019-10-22

## 2019-10-22 RX ORDER — GABAPENTIN 400 MG/1
400 CAPSULE ORAL 3 TIMES DAILY
Qty: 90 CAPSULE | Refills: 1 | OUTPATIENT
Start: 2019-10-22 | End: 2019-12-21

## 2019-10-22 RX ORDER — FLUTICASONE PROPIONATE 50 MCG
SPRAY, SUSPENSION (ML) NASAL
Qty: 16 G | Refills: 3 | OUTPATIENT
Start: 2019-10-22

## 2019-10-22 RX ORDER — FLUTICASONE PROPIONATE 44 UG/1
2 AEROSOL, METERED RESPIRATORY (INHALATION) 2 TIMES DAILY
Qty: 1 INHALER | Refills: 3 | OUTPATIENT
Start: 2019-10-22

## 2019-10-22 RX ORDER — ALBUTEROL SULFATE 90 UG/1
2 AEROSOL, METERED RESPIRATORY (INHALATION) EVERY 4 HOURS PRN
Qty: 1 INHALER | Refills: 3 | OUTPATIENT
Start: 2019-10-22

## 2019-10-22 RX ORDER — OMEPRAZOLE 20 MG/1
20 TABLET, DELAYED RELEASE ORAL DAILY
Qty: 30 TABLET | Refills: 1 | OUTPATIENT
Start: 2019-10-22

## 2019-10-22 RX ORDER — NAPROXEN SODIUM 550 MG/1
550 TABLET ORAL 2 TIMES DAILY WITH MEALS
Qty: 60 TABLET | Refills: 0 | OUTPATIENT
Start: 2019-10-22

## 2019-10-22 RX ORDER — FEXOFENADINE HCL 180 MG/1
180 TABLET ORAL DAILY
Qty: 30 TABLET | Refills: 3 | OUTPATIENT
Start: 2019-10-22

## 2019-10-22 RX ORDER — BACLOFEN 10 MG/1
10 TABLET ORAL NIGHTLY
Qty: 30 TABLET | Refills: 3 | OUTPATIENT
Start: 2019-10-22

## 2019-10-25 RX ORDER — GABAPENTIN 400 MG/1
400 CAPSULE ORAL 3 TIMES DAILY
Qty: 90 CAPSULE | Refills: 0 | Status: SHIPPED | OUTPATIENT
Start: 2019-10-25 | End: 2020-07-29

## 2019-10-25 RX ORDER — NAPROXEN SODIUM 550 MG/1
550 TABLET ORAL 2 TIMES DAILY WITH MEALS
Qty: 60 TABLET | Refills: 0 | Status: SHIPPED | OUTPATIENT
Start: 2019-10-25 | End: 2020-07-29

## 2019-11-17 ENCOUNTER — HOSPITAL ENCOUNTER (EMERGENCY)
Age: 34
Discharge: HOME | End: 2019-11-17
Attending: EMERGENCY MEDICINE
Payer: COMMERCIAL

## 2019-11-17 VITALS
OXYGEN SATURATION: 99 % | BODY MASS INDEX: 34.96 KG/M2 | SYSTOLIC BLOOD PRESSURE: 143 MMHG | HEIGHT: 62 IN | TEMPERATURE: 97.5 F | WEIGHT: 190 LBS | DIASTOLIC BLOOD PRESSURE: 85 MMHG | RESPIRATION RATE: 16 BRPM | HEART RATE: 73 BPM

## 2019-11-17 DIAGNOSIS — L03.116 CELLULITIS OF LEFT LEG: Primary | ICD-10-CM

## 2019-11-17 PROCEDURE — 99282 EMERGENCY DEPT VISIT SF MDM: CPT

## 2019-11-17 PROCEDURE — 6370000000 HC RX 637 (ALT 250 FOR IP): Performed by: EMERGENCY MEDICINE

## 2019-11-17 PROCEDURE — 10060 I&D ABSCESS SIMPLE/SINGLE: CPT

## 2019-11-17 RX ORDER — CLINDAMYCIN HYDROCHLORIDE 300 MG/1
300 CAPSULE ORAL 3 TIMES DAILY
Qty: 30 CAPSULE | Refills: 0 | Status: SHIPPED | OUTPATIENT
Start: 2019-11-17 | End: 2019-11-27

## 2019-11-17 RX ORDER — CLINDAMYCIN HYDROCHLORIDE 150 MG/1
450 CAPSULE ORAL ONCE
Status: COMPLETED | OUTPATIENT
Start: 2019-11-17 | End: 2019-11-17

## 2019-11-17 RX ORDER — OXYCODONE HYDROCHLORIDE AND ACETAMINOPHEN 5; 325 MG/1; MG/1
1 TABLET ORAL ONCE
Status: COMPLETED | OUTPATIENT
Start: 2019-11-17 | End: 2019-11-17

## 2019-11-17 RX ORDER — OXYCODONE HYDROCHLORIDE AND ACETAMINOPHEN 5; 325 MG/1; MG/1
1 TABLET ORAL EVERY 6 HOURS PRN
Qty: 12 TABLET | Refills: 0 | Status: SHIPPED | OUTPATIENT
Start: 2019-11-17 | End: 2019-11-20

## 2019-11-17 RX ADMIN — CLINDAMYCIN HYDROCHLORIDE 450 MG: 150 CAPSULE ORAL at 18:22

## 2019-11-17 RX ADMIN — OXYCODONE HYDROCHLORIDE AND ACETAMINOPHEN 1 TABLET: 5; 325 TABLET ORAL at 18:22

## 2019-11-17 ASSESSMENT — ENCOUNTER SYMPTOMS: ABDOMINAL PAIN: 0

## 2019-11-17 ASSESSMENT — PAIN SCALES - GENERAL
PAINLEVEL_OUTOF10: 6
PAINLEVEL_OUTOF10: 6
PAINLEVEL_OUTOF10: 8
PAINLEVEL_OUTOF10: 7

## 2019-11-17 ASSESSMENT — PAIN DESCRIPTION - PROGRESSION: CLINICAL_PROGRESSION: GRADUALLY IMPROVING

## 2019-11-17 ASSESSMENT — PAIN DESCRIPTION - DESCRIPTORS: DESCRIPTORS: SORE

## 2019-11-17 ASSESSMENT — PAIN DESCRIPTION - PAIN TYPE: TYPE: ACUTE PAIN

## 2019-11-17 ASSESSMENT — PAIN DESCRIPTION - FREQUENCY: FREQUENCY: CONTINUOUS

## 2019-11-17 ASSESSMENT — PAIN - FUNCTIONAL ASSESSMENT: PAIN_FUNCTIONAL_ASSESSMENT: 0-10

## 2019-11-17 ASSESSMENT — PAIN DESCRIPTION - ORIENTATION: ORIENTATION: LEFT;INNER;UPPER

## 2020-06-14 ENCOUNTER — OFFICE VISIT (OUTPATIENT)
Dept: PRIMARY CARE CLINIC | Age: 35
End: 2020-06-14
Payer: COMMERCIAL

## 2020-06-14 VITALS
HEIGHT: 64 IN | BODY MASS INDEX: 40.19 KG/M2 | DIASTOLIC BLOOD PRESSURE: 76 MMHG | SYSTOLIC BLOOD PRESSURE: 130 MMHG | OXYGEN SATURATION: 99 % | HEART RATE: 90 BPM | TEMPERATURE: 97.9 F | WEIGHT: 235.4 LBS | RESPIRATION RATE: 18 BRPM

## 2020-06-14 PROCEDURE — 99213 OFFICE O/P EST LOW 20 MIN: CPT | Performed by: FAMILY MEDICINE

## 2020-06-14 RX ORDER — LORAZEPAM 0.5 MG/1
0.5 TABLET ORAL EVERY 8 HOURS PRN
Qty: 21 TABLET | Refills: 0 | Status: SHIPPED | OUTPATIENT
Start: 2020-06-14 | End: 2020-06-21

## 2020-06-14 NOTE — PATIENT INSTRUCTIONS
Patient Education        Panic Attacks: Care Instructions  Your Care Instructions     During a panic attack, you may have a feeling of intense fear or terror, trouble breathing, chest pain or tightness, heartbeat changes, dizziness, sweating, and shaking. A panic attack starts suddenly and usually lasts from 5 to 20 minutes but may last even longer. You have the most anxiety about 10 minutes after the attack starts. An attack can begin with a stressful event, or it can happen without a cause. Although panic attacks can cause scary symptoms, you can learn to manage them with self-care, counseling, and medicine. Follow-up care is a key part of your treatment and safety. Be sure to make and go to all appointments, and call your doctor if you are having problems. It's also a good idea to know your test results and keep a list of the medicines you take. How can you care for yourself at home? · Take your medicine exactly as directed. Call your doctor if you think you are having a problem with your medicine. · Go to your counseling sessions and follow-up appointments. · Recognize and accept your anxiety. Then, when you are in a situation that makes you anxious, say to yourself, \"This is not an emergency. I feel uncomfortable, but I am not in danger. I can keep going even if I feel anxious. \"  · Be kind to your body:  ? Relieve tension with exercise or a massage. ? Get enough rest.  ? Avoid alcohol, caffeine, nicotine, and illegal drugs. They can increase your anxiety level, cause sleep problems, or trigger a panic attack. ? Learn and do relaxation techniques. See below for more about these techniques. · Engage your mind. Get out and do something you enjoy. Go to a funny movie, or take a walk or hike. Plan your day. Having too much or too little to do can make you anxious. · Keep a record of your symptoms.  Discuss your fears with a good friend or family member, or join a support group for people with similar

## 2020-06-24 ENCOUNTER — TELEPHONE (OUTPATIENT)
Dept: FAMILY MEDICINE CLINIC | Age: 35
End: 2020-06-24

## 2020-07-13 ENCOUNTER — PATIENT MESSAGE (OUTPATIENT)
Dept: FAMILY MEDICINE CLINIC | Age: 35
End: 2020-07-13

## 2020-07-14 ENCOUNTER — PATIENT MESSAGE (OUTPATIENT)
Dept: FAMILY MEDICINE CLINIC | Age: 35
End: 2020-07-14

## 2020-07-14 ENCOUNTER — TELEPHONE (OUTPATIENT)
Dept: FAMILY MEDICINE CLINIC | Age: 35
End: 2020-07-14

## 2020-07-14 NOTE — TELEPHONE ENCOUNTER
[facility] from scheduling called patient to set up MRI for patient and patient states that she wants an MRI of her whole back not just the lumbar. [facility] transferred patient to [address] [place] [address]. Patient then states to writer that she wants a MRI of her whole back and was crying, states that she is in so much pain like she has never been in before.

## 2020-07-14 NOTE — TELEPHONE ENCOUNTER
Lm on vm asking her to call office to schedule.  I explained Dr. Jose Butler note about needing 3 mri's for whole back and we can only do 2 at a time

## 2020-07-14 NOTE — TELEPHONE ENCOUNTER
She needs seen. We can only do 2 MRIs at a time so the whole back would take 3 (neck, thoracic and low back). If someone sees her we can figure out more specifically where we need to image.

## 2020-07-15 NOTE — TELEPHONE ENCOUNTER
Pt is not wanting to make appt, do you want to just start with cervical and thoracic  Since it's neck,shoulder and back?

## 2020-07-15 NOTE — TELEPHONE ENCOUNTER
Spoke to pt via my chart message.  Sharon Hector is ordering 2 mri's and she made [facility] aware of this
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
## 2020-07-28 ENCOUNTER — HOSPITAL ENCOUNTER (OUTPATIENT)
Dept: MRI IMAGING | Age: 35
Discharge: HOME | End: 2020-07-30
Payer: MEDICARE

## 2020-07-28 PROCEDURE — 72146 MRI CHEST SPINE W/O DYE: CPT

## 2020-07-28 PROCEDURE — 72141 MRI NECK SPINE W/O DYE: CPT

## 2020-07-29 ENCOUNTER — PATIENT MESSAGE (OUTPATIENT)
Dept: FAMILY MEDICINE CLINIC | Age: 35
End: 2020-07-29

## 2020-07-29 ENCOUNTER — VIRTUAL VISIT (OUTPATIENT)
Dept: FAMILY MEDICINE CLINIC | Age: 35
End: 2020-07-29
Payer: MEDICARE

## 2020-07-29 PROCEDURE — 99442 PR PHYS/QHP TELEPHONE EVALUATION 11-20 MIN: CPT | Performed by: FAMILY MEDICINE

## 2020-07-29 RX ORDER — PRAZOSIN HYDROCHLORIDE 1 MG/1
2 CAPSULE ORAL NIGHTLY
COMMUNITY
Start: 2020-06-21

## 2020-07-29 RX ORDER — NABUMETONE 500 MG/1
500 TABLET, FILM COATED ORAL 2 TIMES DAILY PRN
Qty: 60 TABLET | Refills: 1 | Status: SHIPPED | OUTPATIENT
Start: 2020-07-29 | End: 2020-08-27 | Stop reason: SINTOL

## 2020-07-29 RX ORDER — MIRTAZAPINE 7.5 MG/1
7.5 TABLET, FILM COATED ORAL NIGHTLY
COMMUNITY
Start: 2020-06-21

## 2020-07-29 RX ORDER — ZIPRASIDONE HYDROCHLORIDE 40 MG/1
40 CAPSULE ORAL
Status: ON HOLD | COMMUNITY
Start: 2020-06-21 | End: 2021-04-08 | Stop reason: HOSPADM

## 2020-07-29 ASSESSMENT — ENCOUNTER SYMPTOMS
BACK PAIN: 1
CHEST TIGHTNESS: 0
BOWEL INCONTINENCE: 0
SHORTNESS OF BREATH: 0
CONSTIPATION: 0
ABDOMINAL PAIN: 0
DIARRHEA: 0
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
## 2020-08-20 ENCOUNTER — PATIENT MESSAGE (OUTPATIENT)
Dept: FAMILY MEDICINE CLINIC | Age: 35
End: 2020-08-20

## 2020-08-21 NOTE — TELEPHONE ENCOUNTER
From: Lianna Thayer  To: Param Gonzalez MD  Sent: 8/20/2020 6:26 PM EDT  Subject: Visit Follow-Up Question    The new medication you sent in is upsetting my stomach making it feel like it's on fire also it does not help the pain please give me something for the pain I had Dr Frank Weathers send in for lyrica at one time because it did help with the pain and it needed a prior aruth now we lnow whats going on can you send in a prior auth for it i dont see pain management or anything for a little while please help me I'm in extreme pain and now my stomach is hurting please help me      ----- Message -----   Sami Mcelroy MD   Sent:7/29/2020 3:31 PM EDT   To:Yeimi Alvarez   Subject:RE: Visit Follow-Up Question    I put in the referral so someone should be calling from their office and I sent in a new medication for you.     Dr. Stanislav Sher      ----- Message -----   From:Yeimi Alvarez   Sent:7/29/2020 12:43 PM EDT   Nataliia Burnett MD   Subject:Visit Follow-Up Question    I have to ask you to please give me something else besides the diclofenac I have some of that cream already and it does not help my pain it shoots across my upper back and neck it also causes the numbness and tingling in my arm and hand please give me something else for the pain this is unbearable right now also I would like a referral to the neurosurgeon as well as pain management I would like to hear what he has to say as well please change my prescription to something that will take the pain away please until I can get into the pain management and the surgeon

## 2020-08-24 RX ORDER — PREGABALIN 50 MG/1
50 CAPSULE ORAL 3 TIMES DAILY
Qty: 90 CAPSULE | Refills: 0 | Status: SHIPPED | OUTPATIENT
Start: 2020-08-24 | End: 2020-10-02

## 2020-08-27 ENCOUNTER — NURSE ONLY (OUTPATIENT)
Dept: LAB | Age: 35
End: 2020-08-27
Payer: MEDICARE

## 2020-08-27 ENCOUNTER — OFFICE VISIT (OUTPATIENT)
Dept: PAIN MANAGEMENT | Age: 35
End: 2020-08-27
Payer: MEDICARE

## 2020-08-27 VITALS
BODY MASS INDEX: 42.17 KG/M2 | HEART RATE: 98 BPM | SYSTOLIC BLOOD PRESSURE: 130 MMHG | HEIGHT: 64 IN | DIASTOLIC BLOOD PRESSURE: 84 MMHG | WEIGHT: 247 LBS

## 2020-08-27 PROBLEM — M54.2 CERVICALGIA: Status: ACTIVE | Noted: 2020-08-27

## 2020-08-27 PROBLEM — M48.02 NEUROFORAMINAL STENOSIS OF CERVICAL SPINE: Status: ACTIVE | Noted: 2020-08-27

## 2020-08-27 PROBLEM — M54.12 CERVICAL RADICULOPATHY: Status: ACTIVE | Noted: 2020-08-27

## 2020-08-27 PROBLEM — M79.18 MYOFASCIAL PAIN: Status: ACTIVE | Noted: 2020-08-27

## 2020-08-27 PROCEDURE — G8417 CALC BMI ABV UP PARAM F/U: HCPCS | Performed by: NURSE PRACTITIONER

## 2020-08-27 PROCEDURE — 4004F PT TOBACCO SCREEN RCVD TLK: CPT | Performed by: NURSE PRACTITIONER

## 2020-08-27 PROCEDURE — 99205 OFFICE O/P NEW HI 60 MIN: CPT

## 2020-08-27 PROCEDURE — G8427 DOCREV CUR MEDS BY ELIG CLIN: HCPCS | Performed by: NURSE PRACTITIONER

## 2020-08-27 PROCEDURE — 99203 OFFICE O/P NEW LOW 30 MIN: CPT | Performed by: NURSE PRACTITIONER

## 2020-08-27 PROCEDURE — 96372 THER/PROPH/DIAG INJ SC/IM: CPT

## 2020-08-27 RX ORDER — KETOROLAC TROMETHAMINE 30 MG/ML
60 INJECTION, SOLUTION INTRAMUSCULAR; INTRAVENOUS ONCE
Status: COMPLETED | OUTPATIENT
Start: 2020-08-27 | End: 2020-08-27

## 2020-08-27 RX ORDER — CELECOXIB 100 MG/1
100 CAPSULE ORAL 2 TIMES DAILY
Qty: 60 CAPSULE | Refills: 3 | Status: SHIPPED | OUTPATIENT
Start: 2020-08-27 | End: 2020-10-02

## 2020-08-27 RX ADMIN — KETOROLAC TROMETHAMINE 60 MG: 30 INJECTION, SOLUTION INTRAMUSCULAR at 14:19

## 2020-08-27 ASSESSMENT — ENCOUNTER SYMPTOMS
RESPIRATORY NEGATIVE: 1
GASTROINTESTINAL NEGATIVE: 1

## 2020-08-27 NOTE — PROGRESS NOTES
"4/18/2018      RE: Geo Hicks  [address] [facility] [id number]          Pediatric Hematology/Oncology Clinic Note     CC:  Geo Hicks is a 18 year old male with ependymoma who presents to the clinic with his mom for labs, a follow up evaluation - He is on study ADVL 1513 Entinostat, he is currently in Cycle 12.    HPI:  Geo is doing well.  He has been drinking well.  He had no headaches last week.  They occur very occasionally.  No rash.  He believes his saliva output is too much with the reduced dose. He still feels like he may be slightly too dry.  He did not initiate salt/soda rinses. His speech is unchanged  No missed doses of medication this past course - they bring their completed diary today.       Fam/Soc: Lives between his  parents (one week at each home).  They have been awarded guardianship of Geo. The families work well together - both parents have remarried.  His dad has a clotting disorder, requiring him to take Warfarin daily. School is going well for Geo but he has missed a lot of high school due to surgeries, rehabilitation and multiple appointments and can choose to \"walk\" with his class for graduation but take the next year to develop skills.  He is still deciding about this.      History was obtained from Geo and his dad.       Allergies   Allergen Reactions     Blood Transfusion Related (Informational Only) Swelling     Periorbital swelling post platelet transfusion     No Known Drug Allergies        Current Outpatient Prescriptions   Medication     calcium carbonate-vitamin D 600-400 MG-UNIT CHEW     Cholecalciferol 400 UNITS CHEW     dexamethasone (DECADRON) 0.5 MG tablet     docusate sodium (COLACE) 100 MG capsule     fexofenadine (ALLEGRA) 180 MG tablet     fluticasone (FLONASE) 50 MCG/ACT spray     melatonin 3 MG tablet     methylphenidate (METADATE CD) 30 MG CR capsule     methylphenidate (RITALIN) 20 MG tablet     mupirocin (BACTROBAN) 2 % ointment     " Toradol 60 mg, given IM L dorsogluteal, as ordered. Patient tolerated it well. No questions re: patient education handout on Toradol. omeprazole (PRILOSEC) 20 MG CR capsule     ondansetron (ZOFRAN-ODT) 4 MG ODT tab     pentoxifylline (TRENTAL) 400 MG CR tablet     polyethylene glycol (MIRALAX/GLYCOLAX) packet     potassium phosphate, monobasic, (K-PHOS) 500 MG tablet     study - entinostat (IDS# 5050) 1 mg tablet     study - entinostat (IDS# 5050) 5 mg tablet     sulfamethoxazole-trimethoprim (BACTRIM/SEPTRA) 400-80 MG per tablet     vitamin E (GNP VITAMIN E) 400 UNIT capsule     No current facility-administered medications for this visit.        Past Medical History:   Diagnosis Date     Cranial nerve dysfunction      Dyspepsia      Ependymoma (H)      Gastro-oesophageal reflux disease      Hearing loss      Intracranial hemorrhage (H)      Migraine      Pilonidal cyst     7-2015     Reduced vision      Refractory obstruction of nasal airway     2nd to nasal valve prolapse     Sleep apnea      Strabismus     gaze palsy        Past Surgical History:   Procedure Laterality Date     GRAFT CARTILAGE FROM POSTERIOR AURICLE Left 10/6/2016    Procedure: GRAFT CARTILAGE FROM POSTERIOR AURICLE;  Surgeon: Tyler Richards MD;  [facility]: [facility]     INCISION AND DRAINAGE PERINEAL, COMBINED Bilateral 7/18/2015    Procedure: COMBINED INCISION AND DRAINAGE PERINEAL;  Surgeon: Dequan Timmons MD;  [facility]     OPTICAL TRACKING SYSTEM CRANIOTOMY, EXCISE TUMOR, COMBINED N/A 4/13/2015    Procedure: COMBINED OPTICAL TRACKING SYSTEM CRANIOTOMY, EXCISE TUMOR;  Surgeon: Francis Velazquez MD;  [facility]     OPTICAL TRACKING SYSTEM CRANIOTOMY, EXCISE TUMOR, COMBINED N/A 4/16/2015    Procedure: COMBINED OPTICAL TRACKING SYSTEM CRANIOTOMY, EXCISE TUMOR;  Surgeon: Francis Velazquez MD;  [facility]     OPTICAL TRACKING SYSTEM CRANIOTOMY, EXCISE TUMOR, COMBINED Bilateral 5/28/2015    Procedure: COMBINED OPTICAL TRACKING SYSTEM CRANIOTOMY, EXCISE TUMOR;  Surgeon: Francis Velazquez MD;  [facility]     OPTICAL TRACKING  SYSTEM CRANIOTOMY, EXCISE TUMOR, COMBINED Bilateral 1/14/2016    Procedure: COMBINED OPTICAL TRACKING SYSTEM CRANIOTOMY, EXCISE TUMOR;  Surgeon: Francis Velazquez MD;  [facility]     OPTICAL TRACKING SYSTEM VENTRICULOSTOMY  4/16/2015    Procedure: OPTICAL TRACKING SYSTEM VENTRICULOSTOMY;  Surgeon: Francis Velazquez MD;  [facility]     REMOVE PORT VASCULAR ACCESS N/A 10/6/2016    Procedure: REMOVE PORT VASCULAR ACCESS;  Surgeon: Bruno Perea MD;  [facility]     RHINOPLASTY N/A 10/6/2016    Procedure: RHINOPLASTY;  Surgeon: Tyler Richards MD;  [facility]     VASCULAR SURGERY  5-2015    single lumen power port       Family History   Problem Relation Age of Onset     Circulatory Father      PE/DVT     Hypothyroidism Father 30     DIABETES Maternal Grandmother      DIABETES Paternal Grandmother      DIABETES Paternal Grandfather      C.A.D. Paternal Grandfather      Hypertension Maternal Grandfather      Thyroid Disease Paternal Aunt      unknown whether hypo or hyper       Review of Systems   Constitutional: Negative.         In a wheelchair    HENT: Negative.  Negative for dental problem, mouth sores and trouble swallowing.    Respiratory: Negative.  Negative for cough.         He had a sleep study last December (2016) with Dr. Moncada at [place] and more recently 4/19/17.  He has moderate obstructive sleep apnea and related hypoventilation effectively treated during the study with bilevel 18/11 with a back up rate  Of 12 breaths per minute and an inspiratory time of 1.2.  The family has been using [facility] in [place] for their home care provider (now [facility]).  It was not set appropriately but was adjusted and now is in line with orders.   Cardiovascular: Negative.  Negative for palpitations.   Gastrointestinal: Negative.    Endocrine:        Follows with Dr. Martin   Genitourinary: Negative.    Musculoskeletal: Negative.    Skin: Negative.  Negative for rash.    Neurological: Negative for headaches.   Psychiatric/Behavioral: Negative.    All other systems reviewed and are negative.       There were no vitals taken for this visit.      Wt Readings from Last 4 Encounters:   04/11/18 71.9 kg (158 lb 8.2 oz) (63 %)*   04/04/18 71.5 kg (157 lb 10.1 oz) (62 %)*   03/28/18 70.8 kg (156 lb 1.4 oz) (59 %)*   03/21/18 70.7 kg (155 lb 13.8 oz) (59 %)*     * Growth percentiles are based on [facility] 2-20 Years data.     Physical Exam   Constitutional: He is oriented to person, place, and time.   HENT:   Head: Normocephalic and atraumatic.   Right Ear: External ear normal.   Left Ear: External ear normal.   Lips slightly dry. No drooling noted. No mouth sores   Eyes: Pupils are equal, round, and reactive to light. Right eye exhibits no discharge. No scleral icterus.   Neck: Normal range of motion.   Cardiovascular: Normal rate, regular rhythm and normal heart sounds.    No murmur heard.  Pulmonary/Chest: Effort normal and breath sounds normal. No respiratory distress. He has no wheezes.   Abdominal: Soft. Bowel sounds are normal. There is no tenderness.   Genitourinary:   Genitourinary Comments: deferred   Lymphadenopathy:     He has no cervical adenopathy.   Neurological: He is alert and oriented to person, place, and time. A cranial nerve deficit is present. Coordination abnormal.   Stands with assist. Ataxic. dymetria especially in upper extremities when accomplishing tasks.    Skin: Skin is warm and dry.   Multiple bruises in different stages of healing on lower legs.  Striae throughout      Psychiatric: Mood and affect normal.       Labs:  Results for orders placed or performed in visit on 04/18/18   CBC with platelets differential   Result Value Ref Range    WBC 4.4 4.0 - 11.0 10e9/L    RBC Count 3.72 (L) 4.4 - 5.9 10e12/L    Hemoglobin 12.6 (L) 13.3 - 17.7 g/dL    Hematocrit 35.3 (L) 40.0 - 53.0 %    MCV 95 78 - 100 fl    MCH 33.9 (H) 26.5 - 33.0 pg    MCHC 35.7 31.5 - 36.5 g/dL     RDW 11.8 10.0 - 15.0 %    Platelet Count 101 (L) 150 - 450 10e9/L    Diff Method Automated Method     % Neutrophils 43.7 %    % Lymphocytes 15.9 %    % Monocytes 14.3 %    % Eosinophils 25.0 %    % Basophils 0.9 %    % Immature Granulocytes 0.2 %    Nucleated RBCs 0 0 /100    Absolute Neutrophil 1.9 1.6 - 8.3 10e9/L    Absolute Lymphocytes 0.7 (L) 0.8 - 5.3 10e9/L    Absolute Monocytes 0.6 0.0 - 1.3 10e9/L    Absolute Eosinophils 1.1 (H) 0.0 - 0.7 10e9/L    Absolute Basophils 0.0 0.0 - 0.2 10e9/L    Abs Immature Granulocytes 0.0 0 - 0.4 10e9/L    Absolute Nucleated RBC 0.0      *Note: Due to a large number of results and/or encounters for the requested time period, some results have not been displayed. A complete set of results can be found in Results Review.       Impression:  1. Ependymoma   2. Due to start Cycle 12 and we will proceed today with count recovery.  3. Platelet count 101,000  4. Some processing and memory problems.    5. Known obstructive sleep apnea treated with BiPAP.    6. Saliva decreased with Rubinol but now with slightly dry mouth.        Plan:  1. He will continue Entinosta 6mg every 7 days for the remainder of the cycle.   2. No changes to Metadate dosing.     3. We will return to previous dose of Rubinol (7 ml) twice times daily for drooling.   4. He will return next week for CBC diff/plt and evaluation.  5. Discussed graduation choices again today.  6.  Continue BiPAP use  7. We will continue to see him weekly on Wednesdays.  He will have imaging following cycle 13 at the completion of study in June.          Kristi Schuler, ALAN CNP

## 2020-08-27 NOTE — PROGRESS NOTES
Subjective:      Patient ID: Altaf Ruiz is a 28 y.o. female. Chief Complaint   Patient presents with    New Patient     post MRI, herniated disc. ref by Dr Monnie Lanes. Pt states she has aching pain in left shoulder and neck area with numbness and tingling and burning down left arm. HPI Initial new patient consult for neck pain. Failed PT, no dry needling. Pain radiates from neck down into left arm, accompanied by numbness. GI upset with NSAID, Lyrica helps tingling down left arm. Pain Assessment  Location of Pain: Neck  Location Modifiers: Left, Superior  Severity of Pain: 9  Quality of Pain: Aching, Sharp  Duration of Pain: Persistent  Frequency of Pain: Constant  Aggravating Factors: Bending, Stretching, Straightening, Other (Comment)(turning head to right)  Limiting Behavior: Yes  Relieving Factors: Heat  Result of Injury: Yes  Work-Related Injury: No  Are there other pain locations you wish to document?: No    Allergies   Allergen Reactions    Norco [Hydrocodone-Acetaminophen] Other (See Comments)     Patient stated it gave her severe headaches       Outpatient Medications Marked as Taking for the 8/27/20 encounter (Office Visit) with RAMON Nath CNP   Medication Sig Dispense Refill    celecoxib (CELEBREX) 100 MG capsule Take 1 capsule by mouth 2 times daily 60 capsule 3    pregabalin (LYRICA) 50 MG capsule Take 1 capsule by mouth 3 times daily for 30 days.  90 capsule 0    mirtazapine (REMERON) 7.5 MG tablet Take 7.5 mg by mouth nightly      prazosin (MINIPRESS) 1 MG capsule Take 1 mg by mouth nightly      ziprasidone (GEODON) 20 MG capsule Take 20 mg by mouth 2 times daily (with meals)      omeprazole (PRILOSEC OTC) 20 MG tablet Take 1 tablet by mouth daily 30 tablet 1    fexofenadine (ALLEGRA ALLERGY) 180 MG tablet Take 1 tablet by mouth daily 30 tablet 3    fluticasone (FLOVENT HFA) 44 MCG/ACT inhaler Inhale 2 puffs into the lungs 2 times daily 1 Inhaler 3    fluticasone (FLONASE) 50 MCG/ACT nasal spray SHAKE LIQUID AND USE 2 SPRAYS IN EACH NOSTRIL DAILY 16 g 3    albuterol sulfate HFA (VENTOLIN HFA) 108 (90 Base) MCG/ACT inhaler Inhale 2 puffs into the lungs every 4 hours as needed for Wheezing or Shortness of Breath 1 Inhaler 3    Multiple Vitamins-Minerals (MULTIVITAMIN WOMEN PO) Take 1 tablet by mouth daily      hydrOXYzine (VISTARIL) 25 MG capsule Take 25 mg by mouth 3 times daily as needed for Anxiety         Past Medical History:   Diagnosis Date    Allergic rhinitis     Anemia     Asthma     Depression     Headache     Herniated intervertebral disc of lumbar spine     Shortness of breath     Tobacco abuse        Past Surgical History:   Procedure Laterality Date     SECTION      TONSILLECTOMY AND ADENOIDECTOMY      TUBAL LIGATION      TYMPANOSTOMY TUBE PLACEMENT         Family History   Problem Relation Age of Onset    Heart Disease Mother     Other Mother         respiratory illness    Kidney Disease Mother     Early Death Mother 45        cardiac arrest    Heart Disease Father     High Blood Pressure Father     Other Father         liver/intestinal    Lung Cancer Maternal Grandmother     Diabetes Other     Early Death Other     Other Other         respiratory illness    Early Death Other     Other Other         respiratory illness    Bleeding Prob Other     Thyroid Disease Other        Social History     Socioeconomic History    Marital status: Single     Spouse name: None    Number of children: None    Years of education: None    Highest education level: None   Occupational History    None   Social Needs    Financial resource strain: None    Food insecurity     Worry: None     Inability: None    Transportation needs     Medical: None     Non-medical: None   Tobacco Use    Smoking status: Current Every Day Smoker     Packs/day: 0.50     Years: 10.00     Pack years: 5.00     Types: E-Cigarettes, Cigarettes    Smokeless tobacco: Never Used    Tobacco comment: Pt advises will see PCP when ready to quit; no longer vapes. Substance and Sexual Activity    Alcohol use: No    Drug use: No    Sexual activity: Yes     Partners: Male     Birth control/protection: Surgical   Lifestyle    Physical activity     Days per week: None     Minutes per session: None    Stress: None   Relationships    Social connections     Talks on phone: None     Gets together: None     Attends Christian service: None     Active member of club or organization: None     Attends meetings of clubs or organizations: None     Relationship status: None    Intimate partner violence     Fear of current or ex partner: None     Emotionally abused: None     Physically abused: None     Forced sexual activity: None   Other Topics Concern    None   Social History Narrative    None     Review of Systems   Constitutional: Positive for activity change. Respiratory: Negative. Cardiovascular: Negative. Gastrointestinal: Negative. Genitourinary: Negative. Musculoskeletal: Positive for arthralgias, myalgias and neck pain. Skin: Negative. Neurological: Positive for numbness. Psychiatric/Behavioral: Positive for sleep disturbance. Objective:   Physical Exam  Vitals signs reviewed. Constitutional:       General: She is not in acute distress. Appearance: She is well-developed. She is not ill-appearing, toxic-appearing or diaphoretic. Interventions: She is not intubated. HENT:      Head: Normocephalic and atraumatic. Neck:      Musculoskeletal: Muscular tenderness present. Pulmonary:      Effort: Pulmonary effort is normal. No tachypnea, bradypnea, accessory muscle usage, prolonged expiration, respiratory distress or retractions. She is not intubated.    Musculoskeletal:      Comments: MRI OF THE CERVICAL SPINE WITHOUT CONTRAST; MRI OF THE THORACIC SPINE WITHOUT   CONTRAST 7/28/2020 2:32 pm; 7/28/2020 3:05 pm     TECHNIQUE:   Multiplanar multisequence MRI of the cervical spine was performed without the   administration of intravenous contrast.; Multiplanar multisequence MRI of the   thoracic spine was performed without the administration of intravenous   contrast.       COMPARISON:   None.       HISTORY:   ORDERING SYSTEM PROVIDED HISTORY: Neck pain, acute   TECHNOLOGIST PROVIDED HISTORY:   neck pain   Is the patient pregnant?->No   Reason for Exam:  Neck and upper back pian, left arm numbness. [facility] 6/14/2020   Acuity: Acute   Type of Exam: Initial   Mechanism of Injury: [facility] 6/14/2020; ORDERING SYSTEM PROVIDED HISTORY: Chronic   upper back pain   TECHNOLOGIST PROVIDED HISTORY:   Is the patient pregnant?->No   Reason for Exam:  Neck and upper back pian, left arm numbness. [facility] 6/14/2020   Acuity: Chronic   Type of Exam: Initial   Mechanism of Injury: [facility] 6/14/2020       FINDINGS:   MRI cervical spine:       BONES/ALIGNMENT: There is straightening of normal cervical lordosis.  There   is normal alignment of the cervical spine.  The vertebral body heights are   maintained.  The bone marrow signal appears unremarkable.  There is no   fracture or destructive osseous lesion.  There is disc desiccation.  The   intervertebral disc heights are grossly maintained.  There is congenitally   narrow cervical spinal canal.       SPINAL CORD: There is mild heterogeneous T2 signal in the cervical spinal   cord, likely related to artifacts.       SOFT TISSUES: No paraspinal mass identified.       C2-3: There is no significant disc herniation, spinal canal stenosis or   neural foraminal narrowing.       C3-4: There is disc bulge with minimal spinal canal narrowing, minimal left   and mild-to-moderate right foraminal narrowing.       C4-5: There is disc bulge without spinal canal or foraminal narrowing.       C5-6: There is disc bulge with mild left foraminal narrowing.  No spinal   canal narrowing.       C6-7: There is no significant disc herniation or spinal canal narrowing. There is mild left foraminal narrowing.       C7-T1: There is no significant disc herniation, spinal canal stenosis or   neural foraminal narrowing.       MRI thoracic spine:       BONES/ALIGNMENT: There is normal alignment of the thoracic spine.  The   vertebral body heights are maintained. The bone marrow signal appears   unremarkable.  There is no acute fracture or destructive osseous lesion.       SPINAL CORD: There is mild heterogeneous T2 signal in the thoracic spinal   cord, likely related to artifacts.  The thoracic spinal cord is otherwise   within normal limits.       SOFT TISSUES: No paraspinal mass identified.       Degenerative disc disease: There is degenerative disc disease with endplate   changes and endplate osteophyte formation.  There are Schmorl's nodes at   multiple endplates.  There is right paracentral disc protrusion at T6-7,   without spinal canal or foraminal stenosis.         Impression   MRI cervical spine:       Straightening of normal cervical lordosis.       Degenerative disc disease as described above, exacerbating congenitally   narrow cervical spinal canal.       Spinal canal narrowing, minimal at C3-4.       Foraminal narrowing, mild to moderate at right C3-4, mild at left C5-6 and   left C6-7, minimal at left C3-4.       MRI thoracic spine:       No acute abnormality in the thoracic spine.       No significant spinal canal or foraminal stenosis.            Skin:     General: Skin is warm and dry. Capillary Refill: Capillary refill takes less than 2 seconds. Nails: There is no clubbing. Neurological:      Mental Status: She is alert and oriented to person, place, and time. GCS: GCS eye subscore is 4. GCS verbal subscore is 5. GCS motor subscore is 6. Cranial Nerves: No cranial nerve deficit.    Psychiatric:         Attention and Perception: Attention and perception normal.         Mood and Affect: Mood and affect normal.         Speech: Speech normal.         Behavior: Behavior is cooperative. Cognition and Memory: Cognition normal.         Judgment: Judgment normal. Judgment is not impulsive or inappropriate. Assessment:       Diagnosis Orders   1. Cervical radiculopathy  ketorolac (TORADOL) injection 60 mg   2. Cervicalgia     3. Myofascial pain     4. Neuroforaminal stenosis of cervical spine             Plan:    Toradol 60mg IM x1. Celebrex 100mg bid. Cervical and thoracic MRI reviewed, schedule left C7 transforaminal. Consider TPI's upper trap if no significant relief.          Baljinder Castillo, APRN - CNP

## 2020-08-27 NOTE — PATIENT INSTRUCTIONS
you to the surgery center. Your ride must stay in the hospital while you are having the injection done. If they cannot stay, the injection will be rescheduled. The valium may affect your judgment following the procedure and driving a vehicle within 24 hours after the sedation could be dangerous. 6.  Wear simple loose clothing, which can be easily changed. 7.  Leave jewelry (including rings) and other valuables at home. 8.  You will be asked to sign several forms prior to surgery; patients under the age of 25 must have a parent or legal guardian sign the permit to be able to do the procedure. 9.  You must have finished any antibiotic prescribed for recent infections. If required, please take pre-procedure antibiotic or other pre-procedure medications as instructed. 10. Bring inhalers and pain medications with you to your procedure. 11. Bring your MRI/CT films if they were done outside of the [facility]. 12. If you should develop a cold, sore throat, cough, fever or other new indication of illness or infection, or are started on antibiotics within 2 weeks of the scheduled procedure, please notify the [facility] office as early as possible at [phone]. If calling after 4:30pm the day prior to your scheduled procedure please contact [phone] and Leave a Voice Message.

## 2020-09-04 ENCOUNTER — APPOINTMENT (OUTPATIENT)
Dept: GENERAL RADIOLOGY | Age: 35
End: 2020-09-04
Attending: PHYSICAL MEDICINE & REHABILITATION
Payer: MEDICARE

## 2020-09-04 ENCOUNTER — HOSPITAL ENCOUNTER (OUTPATIENT)
Age: 35
Setting detail: OUTPATIENT SURGERY
Discharge: HOME | End: 2020-09-04
Attending: PHYSICAL MEDICINE & REHABILITATION | Admitting: PHYSICAL MEDICINE & REHABILITATION
Payer: MEDICARE

## 2020-09-04 VITALS
HEART RATE: 52 BPM | OXYGEN SATURATION: 98 % | SYSTOLIC BLOOD PRESSURE: 126 MMHG | DIASTOLIC BLOOD PRESSURE: 68 MMHG | RESPIRATION RATE: 18 BRPM | TEMPERATURE: 97.8 F

## 2020-09-04 PROCEDURE — 6360000002 HC RX W HCPCS: Performed by: PHYSICAL MEDICINE & REHABILITATION

## 2020-09-04 PROCEDURE — 64479 NJX AA&/STRD TFRM EPI C/T 1: CPT | Performed by: PHYSICAL MEDICINE & REHABILITATION

## 2020-09-04 PROCEDURE — 3600000054 HC PAIN LEVEL 3 BASE: Performed by: PHYSICAL MEDICINE & REHABILITATION

## 2020-09-04 PROCEDURE — 3209999900 FLUORO FOR SURGICAL PROCEDURES

## 2020-09-04 PROCEDURE — 6360000004 HC RX CONTRAST MEDICATION: Performed by: PHYSICAL MEDICINE & REHABILITATION

## 2020-09-04 PROCEDURE — 2500000003 HC RX 250 WO HCPCS: Performed by: PHYSICAL MEDICINE & REHABILITATION

## 2020-09-04 PROCEDURE — 7100000010 HC PHASE II RECOVERY - FIRST 15 MIN: Performed by: PHYSICAL MEDICINE & REHABILITATION

## 2020-09-04 PROCEDURE — 2709999900 HC NON-CHARGEABLE SUPPLY: Performed by: PHYSICAL MEDICINE & REHABILITATION

## 2020-09-04 RX ORDER — BUPIVACAINE HYDROCHLORIDE 2.5 MG/ML
INJECTION, SOLUTION EPIDURAL; INFILTRATION; INTRACAUDAL PRN
Status: DISCONTINUED | OUTPATIENT
Start: 2020-09-04 | End: 2020-09-04 | Stop reason: ALTCHOICE

## 2020-09-04 RX ORDER — LIDOCAINE HYDROCHLORIDE 10 MG/ML
INJECTION, SOLUTION EPIDURAL; INFILTRATION; INTRACAUDAL; PERINEURAL PRN
Status: DISCONTINUED | OUTPATIENT
Start: 2020-09-04 | End: 2020-09-04 | Stop reason: ALTCHOICE

## 2020-09-04 RX ORDER — DEXAMETHASONE SODIUM PHOSPHATE 10 MG/ML
INJECTION INTRAMUSCULAR; INTRAVENOUS PRN
Status: DISCONTINUED | OUTPATIENT
Start: 2020-09-04 | End: 2020-09-04 | Stop reason: ALTCHOICE

## 2020-09-04 ASSESSMENT — PAIN SCALES - GENERAL
PAINLEVEL_OUTOF10: 5
PAINLEVEL_OUTOF10: 6

## 2020-09-04 ASSESSMENT — PAIN DESCRIPTION - PAIN TYPE
TYPE: CHRONIC PAIN
TYPE: CHRONIC PAIN

## 2020-09-04 NOTE — H&P
.            Signed        Expand All Collapse All     Show:Clear all  [x]Manual[x]Template[]Copied    Added by:  [x]RAMON Her CNP    []Leilani for details  Subjective:      Patient ID: Braulio Pena is a 28 y.o. female. Chief Complaint   Patient presents with    New Patient       post MRI, herniated disc. ref by Dr Milind Kang. Pt states she has aching pain in left shoulder and neck area with numbness and tingling and burning down left arm. HPI Initial new patient consult for neck pain. Failed PT, no dry needling. Pain radiates from neck down into left arm, accompanied by numbness. GI upset with NSAID, Lyrica helps tingling down left arm. Pain Assessment  Location of Pain: Neck  Location Modifiers: Left, Superior  Severity of Pain: 9  Quality of Pain: Aching, Sharp  Duration of Pain: Persistent  Frequency of Pain: Constant  Aggravating Factors: Bending, Stretching, Straightening, Other (Comment)(turning head to right)  Limiting Behavior: Yes  Relieving Factors: Heat  Result of Injury: Yes  Work-Related Injury: No  Are there other pain locations you wish to document?: No           Allergies   Allergen Reactions    Norco [Hydrocodone-Acetaminophen] Other (See Comments)       Patient stated it gave her severe headaches        Active Medications          Outpatient Medications Marked as Taking for the 8/27/20 encounter (Office Visit) with Cecile Leyden, APRN - CNP   Medication Sig Dispense Refill    celecoxib (CELEBREX) 100 MG capsule Take 1 capsule by mouth 2 times daily 60 capsule 3    pregabalin (LYRICA) 50 MG capsule Take 1 capsule by mouth 3 times daily for 30 days.  90 capsule 0    mirtazapine (REMERON) 7.5 MG tablet Take 7.5 mg by mouth nightly        prazosin (MINIPRESS) 1 MG capsule Take 1 mg by mouth nightly        ziprasidone (GEODON) 20 MG capsule Take 20 mg by mouth 2 times daily (with meals)        omeprazole (PRILOSEC OTC) 20 MG tablet Take 1 tablet by mouth daily 30 tablet 1    fexofenadine (ALLEGRA ALLERGY) 180 MG tablet Take 1 tablet by mouth daily 30 tablet 3    fluticasone (FLOVENT HFA) 44 MCG/ACT inhaler Inhale 2 puffs into the lungs 2 times daily 1 Inhaler 3    fluticasone (FLONASE) 50 MCG/ACT nasal spray SHAKE LIQUID AND USE 2 SPRAYS IN EACH NOSTRIL DAILY 16 g 3    albuterol sulfate HFA (VENTOLIN HFA) 108 (90 Base) MCG/ACT inhaler Inhale 2 puffs into the lungs every 4 hours as needed for Wheezing or Shortness of Breath 1 Inhaler 3    Multiple Vitamins-Minerals (MULTIVITAMIN WOMEN PO) Take 1 tablet by mouth daily        hydrOXYzine (VISTARIL) 25 MG capsule Take 25 mg by mouth 3 times daily as needed for Anxiety               Past Medical History        Past Medical History:   Diagnosis Date    Allergic rhinitis      Anemia      Asthma      Depression      Headache      Herniated intervertebral disc of lumbar spine      Shortness of breath      Tobacco abuse             Past Surgical History         Past Surgical History:   Procedure Laterality Date     SECTION       TONSILLECTOMY AND ADENOIDECTOMY       TUBAL LIGATION       TYMPANOSTOMY TUBE PLACEMENT              Family History         Family History   Problem Relation Age of Onset    Heart Disease Mother      Other Mother           respiratory illness    Kidney Disease Mother      Early Death Mother 45         cardiac arrest    Heart Disease Father      High Blood Pressure Father      Other Father           liver/intestinal    Lung Cancer Maternal Grandmother      Diabetes Other      Early Death Other      Other Other           respiratory illness    Early Death Other      Other Other           respiratory illness    Bleeding Prob Other      Thyroid Disease Other             Social History   Social History            Socioeconomic History    Marital status: Single       Spouse name: None    Number of children: None    Years of education: None    significant disc herniation, spinal canal stenosis or   neural foraminal narrowing. C3-4: There is disc bulge with minimal spinal canal narrowing, minimal left   and mild-to-moderate right foraminal narrowing. C4-5: There is disc bulge without spinal canal or foraminal narrowing. C5-6: There is disc bulge with mild left foraminal narrowing. No spinal   canal narrowing. C6-7: There is no significant disc herniation or spinal canal narrowing. There is mild left foraminal narrowing. C7-T1: There is no significant disc herniation, spinal canal stenosis or   neural foraminal narrowing. MRI thoracic spine:       BONES/ALIGNMENT: There is normal alignment of the thoracic spine. The   vertebral body heights are maintained. The bone marrow signal appears   unremarkable. There is no acute fracture or destructive osseous lesion. SPINAL CORD: There is mild heterogeneous T2 signal in the thoracic spinal   cord, likely related to artifacts. The thoracic spinal cord is otherwise   within normal limits. SOFT TISSUES: No paraspinal mass identified. Degenerative disc disease: There is degenerative disc disease with endplate   changes and endplate osteophyte formation. There are Schmorl's nodes at   multiple endplates. There is right paracentral disc protrusion at T6-7,   without spinal canal or foraminal stenosis. Impression   MRI cervical spine:       Straightening of normal cervical lordosis. Degenerative disc disease as described above, exacerbating congenitally   narrow cervical spinal canal.       Spinal canal narrowing, minimal at C3-4. Foraminal narrowing, mild to moderate at right C3-4, mild at left C5-6 and   left C6-7, minimal at left C3-4. MRI thoracic spine:       No acute abnormality in the thoracic spine. No significant spinal canal or foraminal stenosis. Skin:     General: Skin is warm and dry.       Capillary

## 2020-09-04 NOTE — OP NOTE
sterilely prepped and draped in the usual fashion in the right decubitus  position. Time out was verified for correct patient, side, level and procedure. SEDATION:   No conscious sedation was performed during the procedure. The patient remained awake and conversed throughout the procedure. The patient underwent pulse oximetry and blood pressure monitoring independently by a trained observer, as well as by a physician. PROCEDURE:  Under image-intensifier control, a 25 gauge needle x 2.5 inch spinal needle was guided successfully into the epidural space employing a posterior lateral/oblique approach. Needle aspiration was negative for heme or CSF. Instillation of  .5 mL of Omnipaque 240 contrast medium opacified the spinal nerve and demonstrated contiguous flow into the epidural space. No vascular spread was noted. Digital subtraction was employed to evaluate for vascular spread. The patient was monitored for any untoward reaction to contrast medium before proceeding with procedure #2. The patient did not report pain reproduction in a concordant distribution. Following needle position verification, a test dose of .5 mL of sterile lidocaine 0.5% was administered and patient monitored for any adverse effects. Then, 1 mL of   was instilled into the epidural space and the patient's response was again monitored. Finally, Dexamethasone (Decadron 10 mg/mL) 1 ml of 0.5% bupivacaine was then instilled. The patient's response was again monitored. The spinal needle was removed. The patient tolerated the procedure well and without complications and was noted to be in stable condition prior to discharge from the procedure center with discharge instructions. EBL: no blood loss    SPECIMEN: none    IMPRESSIONS:  1. LC7 transforaminal epidurogram, epidural anesthesia and epidural steroid injection procedures accomplished without incident. RECOMMENDATIONS:  1.  Complete and return Post-Procedure Pain and

## 2020-09-04 NOTE — INTERVAL H&P NOTE
I have interviewed and examined the patient and reviewed the recent History and Physical.  There have been no changes to the recent H&P documentation. The surgical consent form has been signed. Last anticoagulant medication use was:na    Premedication taken for contrast allergy? No    Valium taken for oral sedation? No    No outpatient medications have been marked as taking for the 9/4/20 encounter [facility] Encounter). The patient understands the planned operation and its associated risks and benefits and agrees to proceed.         Electronically signed by Albert Lundborg, MD on 9/4/2020 at 11:36 AM

## 2020-09-11 ENCOUNTER — OFFICE VISIT (OUTPATIENT)
Dept: NEUROSURGERY | Age: 35
End: 2020-09-11
Payer: MEDICARE

## 2020-09-11 VITALS
DIASTOLIC BLOOD PRESSURE: 82 MMHG | HEIGHT: 64 IN | SYSTOLIC BLOOD PRESSURE: 122 MMHG | OXYGEN SATURATION: 99 % | TEMPERATURE: 96.4 F | HEART RATE: 68 BPM | RESPIRATION RATE: 16 BRPM | BODY MASS INDEX: 41.15 KG/M2 | WEIGHT: 241 LBS

## 2020-09-11 PROCEDURE — G8417 CALC BMI ABV UP PARAM F/U: HCPCS | Performed by: NURSE PRACTITIONER

## 2020-09-11 PROCEDURE — G8427 DOCREV CUR MEDS BY ELIG CLIN: HCPCS | Performed by: NURSE PRACTITIONER

## 2020-09-11 PROCEDURE — 99203 OFFICE O/P NEW LOW 30 MIN: CPT | Performed by: NURSE PRACTITIONER

## 2020-09-11 PROCEDURE — 99214 OFFICE O/P EST MOD 30 MIN: CPT | Performed by: NURSE PRACTITIONER

## 2020-09-11 PROCEDURE — 4004F PT TOBACCO SCREEN RCVD TLK: CPT | Performed by: NURSE PRACTITIONER

## 2020-09-11 SDOH — HEALTH STABILITY: MENTAL HEALTH: HOW MANY STANDARD DRINKS CONTAINING ALCOHOL DO YOU HAVE ON A TYPICAL DAY?: 1 OR 2

## 2020-09-11 SDOH — HEALTH STABILITY: MENTAL HEALTH: HOW OFTEN DO YOU HAVE A DRINK CONTAINING ALCOHOL?: MONTHLY OR LESS
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
## 2020-09-18 ENCOUNTER — HOSPITAL ENCOUNTER (OUTPATIENT)
Age: 35
Setting detail: OUTPATIENT SURGERY
Discharge: HOME | End: 2020-09-18
Attending: PHYSICAL MEDICINE & REHABILITATION | Admitting: PHYSICAL MEDICINE & REHABILITATION
Payer: MEDICARE

## 2020-09-18 ENCOUNTER — APPOINTMENT (OUTPATIENT)
Dept: GENERAL RADIOLOGY | Age: 35
End: 2020-09-18
Attending: PHYSICAL MEDICINE & REHABILITATION
Payer: MEDICARE

## 2020-09-18 VITALS
TEMPERATURE: 97.9 F | HEART RATE: 93 BPM | DIASTOLIC BLOOD PRESSURE: 78 MMHG | SYSTOLIC BLOOD PRESSURE: 136 MMHG | BODY MASS INDEX: 42.99 KG/M2 | RESPIRATION RATE: 16 BRPM | WEIGHT: 251.8 LBS | OXYGEN SATURATION: 99 % | HEIGHT: 64 IN

## 2020-09-18 PROCEDURE — 7100000010 HC PHASE II RECOVERY - FIRST 15 MIN: Performed by: PHYSICAL MEDICINE & REHABILITATION

## 2020-09-18 PROCEDURE — 64479 NJX AA&/STRD TFRM EPI C/T 1: CPT | Performed by: PHYSICAL MEDICINE & REHABILITATION

## 2020-09-18 PROCEDURE — 3209999900 FLUORO FOR SURGICAL PROCEDURES

## 2020-09-18 PROCEDURE — 3600000055 HC PAIN LEVEL 3 ADDL 15 MIN: Performed by: PHYSICAL MEDICINE & REHABILITATION

## 2020-09-18 PROCEDURE — 6360000002 HC RX W HCPCS: Performed by: PHYSICAL MEDICINE & REHABILITATION

## 2020-09-18 PROCEDURE — 2709999900 HC NON-CHARGEABLE SUPPLY: Performed by: PHYSICAL MEDICINE & REHABILITATION

## 2020-09-18 PROCEDURE — 6360000004 HC RX CONTRAST MEDICATION: Performed by: PHYSICAL MEDICINE & REHABILITATION

## 2020-09-18 PROCEDURE — 3600000054 HC PAIN LEVEL 3 BASE: Performed by: PHYSICAL MEDICINE & REHABILITATION

## 2020-09-18 PROCEDURE — 2500000003 HC RX 250 WO HCPCS: Performed by: PHYSICAL MEDICINE & REHABILITATION

## 2020-09-18 RX ORDER — DEXAMETHASONE SODIUM PHOSPHATE 10 MG/ML
INJECTION INTRAMUSCULAR; INTRAVENOUS PRN
Status: DISCONTINUED | OUTPATIENT
Start: 2020-09-18 | End: 2020-09-18 | Stop reason: ALTCHOICE

## 2020-09-18 RX ORDER — BUPIVACAINE HYDROCHLORIDE 2.5 MG/ML
INJECTION, SOLUTION EPIDURAL; INFILTRATION; INTRACAUDAL PRN
Status: DISCONTINUED | OUTPATIENT
Start: 2020-09-18 | End: 2020-09-18 | Stop reason: ALTCHOICE

## 2020-09-18 RX ORDER — LIDOCAINE HYDROCHLORIDE 10 MG/ML
INJECTION, SOLUTION EPIDURAL; INFILTRATION; INTRACAUDAL; PERINEURAL PRN
Status: DISCONTINUED | OUTPATIENT
Start: 2020-09-18 | End: 2020-09-18 | Stop reason: ALTCHOICE

## 2020-09-18 ASSESSMENT — PAIN DESCRIPTION - DESCRIPTORS
DESCRIPTORS: ACHING
DESCRIPTORS: ACHING

## 2020-09-18 ASSESSMENT — PAIN - FUNCTIONAL ASSESSMENT: PAIN_FUNCTIONAL_ASSESSMENT: 0-10

## 2020-09-18 ASSESSMENT — PAIN DESCRIPTION - ORIENTATION: ORIENTATION: LEFT

## 2020-09-18 ASSESSMENT — PAIN DESCRIPTION - LOCATION: LOCATION: NECK

## 2020-09-18 ASSESSMENT — PAIN DESCRIPTION - PAIN TYPE: TYPE: CHRONIC PAIN

## 2020-09-18 ASSESSMENT — PAIN SCALES - GENERAL: PAINLEVEL_OUTOF10: 9

## 2020-09-18 NOTE — INTERVAL H&P NOTE
I have interviewed and examined the patient and reviewed the recent History and Physical.  There have been no changes to the recent H&P documentation. The surgical consent form has been signed. Last anticoagulant medication use was:na    Premedication taken for contrast allergy? No    Valium taken for oral sedation? No    Outpatient Medications Marked as Taking for the 9/18/20 encounter [facility] Encounter)   Medication Sig Dispense Refill    celecoxib (CELEBREX) 100 MG capsule Take 1 capsule by mouth 2 times daily 60 capsule 3    pregabalin (LYRICA) 50 MG capsule Take 1 capsule by mouth 3 times daily for 30 days. 90 capsule 0    mirtazapine (REMERON) 7.5 MG tablet Take 7.5 mg by mouth nightly      prazosin (MINIPRESS) 1 MG capsule Take 1 mg by mouth nightly      ziprasidone (GEODON) 20 MG capsule Take 20 mg by mouth 2 times daily (with meals)      omeprazole (PRILOSEC OTC) 20 MG tablet Take 1 tablet by mouth daily 30 tablet 1    fexofenadine (ALLEGRA ALLERGY) 180 MG tablet Take 1 tablet by mouth daily 30 tablet 3    fluticasone (FLOVENT HFA) 44 MCG/ACT inhaler Inhale 2 puffs into the lungs 2 times daily 1 Inhaler 3    fluticasone (FLONASE) 50 MCG/ACT nasal spray SHAKE LIQUID AND USE 2 SPRAYS IN EACH NOSTRIL DAILY 16 g 3    albuterol sulfate HFA (VENTOLIN HFA) 108 (90 Base) MCG/ACT inhaler Inhale 2 puffs into the lungs every 4 hours as needed for Wheezing or Shortness of Breath 1 Inhaler 3    Multiple Vitamins-Minerals (MULTIVITAMIN WOMEN PO) Take 1 tablet by mouth daily      hydrOXYzine (VISTARIL) 25 MG capsule Take 25 mg by mouth 3 times daily as needed for Anxiety         The patient understands the planned operation and its associated risks and benefits and agrees to proceed.         Electronically signed by Svetlana Arreola MD on 9/18/2020 at 9:09 AM

## 2020-09-18 NOTE — H&P
of Pain: 9  Quality of Pain: Aching, Sharp  Duration of Pain: Persistent  Frequency of Pain: Constant  Aggravating Factors: Bending, Stretching, Straightening, Other (Comment)(turning head to right)  Limiting Behavior: Yes  Relieving Factors: Heat  Result of Injury: Yes  Work-Related Injury: No  Are there other pain locations you wish to document?: No               Allergies   Allergen Reactions    Norco [Hydrocodone-Acetaminophen] Other (See Comments)       Patient stated it gave her severe headaches         Active Medications               Outpatient Medications Marked as Taking for the 8/27/20 encounter (Office Visit) with RAMON Dietz CNP   Medication Sig Dispense Refill    celecoxib (CELEBREX) 100 MG capsule Take 1 capsule by mouth 2 times daily 60 capsule 3    pregabalin (LYRICA) 50 MG capsule Take 1 capsule by mouth 3 times daily for 30 days.  90 capsule 0    mirtazapine (REMERON) 7.5 MG tablet Take 7.5 mg by mouth nightly        prazosin (MINIPRESS) 1 MG capsule Take 1 mg by mouth nightly        ziprasidone (GEODON) 20 MG capsule Take 20 mg by mouth 2 times daily (with meals)        omeprazole (PRILOSEC OTC) 20 MG tablet Take 1 tablet by mouth daily 30 tablet 1    fexofenadine (ALLEGRA ALLERGY) 180 MG tablet Take 1 tablet by mouth daily 30 tablet 3    fluticasone (FLOVENT HFA) 44 MCG/ACT inhaler Inhale 2 puffs into the lungs 2 times daily 1 Inhaler 3    fluticasone (FLONASE) 50 MCG/ACT nasal spray SHAKE LIQUID AND USE 2 SPRAYS IN EACH NOSTRIL DAILY 16 g 3    albuterol sulfate HFA (VENTOLIN HFA) 108 (90 Base) MCG/ACT inhaler Inhale 2 puffs into the lungs every 4 hours as needed for Wheezing or Shortness of Breath 1 Inhaler 3    Multiple Vitamins-Minerals (MULTIVITAMIN WOMEN PO) Take 1 tablet by mouth daily        hydrOXYzine (VISTARIL) 25 MG capsule Take 25 mg by mouth 3 times daily as needed for Anxiety                Past Medical History           Past Medical History:   Diagnosis Date    Allergic rhinitis      Anemia      Asthma      Depression      Headache      Herniated intervertebral disc of lumbar spine      Shortness of breath      Tobacco abuse              Past Surgical History             Past Surgical History:   Procedure Laterality Date     SECTION       TONSILLECTOMY AND ADENOIDECTOMY       TUBAL LIGATION       TYMPANOSTOMY TUBE PLACEMENT               Family History             Family History   Problem Relation Age of Onset    Heart Disease Mother      Other Mother           respiratory illness    Kidney Disease Mother      Early Death Mother 45         cardiac arrest    Heart Disease Father      High Blood Pressure Father      Other Father           liver/intestinal    Lung Cancer Maternal Grandmother      Diabetes Other      Early Death Other      Other Other           respiratory illness    Early Death Other      Other Other           respiratory illness    Bleeding Prob Other      Thyroid Disease Other              Social History   Social History                Socioeconomic History    Marital status: Single       Spouse name: None    Number of children: None    Years of education: None    Highest education level: None   Occupational History    None   Social Needs    Financial resource strain: None    Food insecurity       Worry: None       Inability: None    Transportation needs       Medical: None       Non-medical: None   Tobacco Use    Smoking status: Current Every Day Smoker       Packs/day: 0.50       Years: 10.00       Pack years: 5.00       Types: E-Cigarettes, Cigarettes    Smokeless tobacco: Never Used    Tobacco comment: Pt advises will see PCP when ready to quit; no longer vapes.      Substance and Sexual Activity    Alcohol use: No    Drug use: No    Sexual activity: Yes       Partners: Male       Birth control/protection: Surgical   Lifestyle    Physical activity       Days per week: None Minutes per session: None    Stress: None   Relationships    Social connections       Talks on phone: None       Gets together: None       Attends [employer] service: None       Active member of club or organization: None       Attends meetings of clubs or organizations: None       Relationship status: None    Intimate partner violence       Fear of current or ex partner: None       Emotionally abused: None       Physically abused: None       Forced sexual activity: None   Other Topics Concern    None   Social History Narrative    None         Review of Systems   Constitutional: Positive for activity change. Respiratory: Negative. Cardiovascular: Negative. Gastrointestinal: Negative. Genitourinary: Negative. Musculoskeletal: Positive for arthralgias, myalgias and neck pain. Skin: Negative. Neurological: Positive for numbness. Psychiatric/Behavioral: Positive for sleep disturbance. Objective:   Physical Exam  Vitals signs reviewed. Constitutional:       General: She is not in acute distress. Appearance: She is well-developed. She is not ill-appearing, toxic-appearing or diaphoretic. Interventions: She is not intubated. HENT:      Head: Normocephalic and atraumatic. Neck:      Musculoskeletal: Muscular tenderness present. Pulmonary:      Effort: Pulmonary effort is normal. No tachypnea, bradypnea, accessory muscle usage, prolonged expiration, respiratory distress or retractions. She is not intubated. Musculoskeletal:      Comments: MRI OF THE CERVICAL SPINE WITHOUT CONTRAST; MRI OF THE THORACIC SPINE WITHOUT   CONTRAST 7/28/2020 2:32 pm; 7/28/2020 3:05 pm       TECHNIQUE:   Multiplanar multisequence MRI of the cervical spine was performed without the   administration of intravenous contrast.; Multiplanar multisequence MRI of the   thoracic spine was performed without the administration of intravenous   contrast.       COMPARISON:   None.        HISTORY:   ORDERING SYSTEM PROVIDED HISTORY: Neck pain, acute   TECHNOLOGIST PROVIDED HISTORY:   neck pain   Is the patient pregnant?->No   Reason for Exam:  Neck and upper back pian, left arm numbness. [facility] 6/14/2020   Acuity: Acute   Type of Exam: Initial   Mechanism of Injury: [facility] 6/14/2020; ORDERING SYSTEM PROVIDED HISTORY: Chronic   upper back pain   TECHNOLOGIST PROVIDED HISTORY:   Is the patient pregnant?->No   Reason for Exam:  Neck and upper back pian, left arm numbness. [facility] 6/14/2020   Acuity: Chronic   Type of Exam: Initial   Mechanism of Injury: [facility] 6/14/2020       FINDINGS:   MRI cervical spine:       BONES/ALIGNMENT: There is straightening of normal cervical lordosis. There   is normal alignment of the cervical spine. The vertebral body heights are   maintained. The bone marrow signal appears unremarkable. There is no   fracture or destructive osseous lesion. There is disc desiccation. The   intervertebral disc heights are grossly maintained. There is congenitally   narrow cervical spinal canal.       SPINAL CORD: There is mild heterogeneous T2 signal in the cervical spinal   cord, likely related to artifacts. SOFT TISSUES: No paraspinal mass identified. C2-3: There is no significant disc herniation, spinal canal stenosis or   neural foraminal narrowing. C3-4: There is disc bulge with minimal spinal canal narrowing, minimal left   and mild-to-moderate right foraminal narrowing. C4-5: There is disc bulge without spinal canal or foraminal narrowing. C5-6: There is disc bulge with mild left foraminal narrowing. No spinal   canal narrowing. C6-7: There is no significant disc herniation or spinal canal narrowing. There is mild left foraminal narrowing. C7-T1: There is no significant disc herniation, spinal canal stenosis or   neural foraminal narrowing. MRI thoracic spine:       BONES/ALIGNMENT: There is normal alignment of the thoracic spine.   The   vertebral body heights are maintained. The bone marrow signal appears   unremarkable. There is no acute fracture or destructive osseous lesion. SPINAL CORD: There is mild heterogeneous T2 signal in the thoracic spinal   cord, likely related to artifacts. The thoracic spinal cord is otherwise   within normal limits. SOFT TISSUES: No paraspinal mass identified. Degenerative disc disease: There is degenerative disc disease with endplate   changes and endplate osteophyte formation. There are Schmorl's nodes at   multiple endplates. There is right paracentral disc protrusion at T6-7,   without spinal canal or foraminal stenosis. Impression   MRI cervical spine:       Straightening of normal cervical lordosis. Degenerative disc disease as described above, exacerbating congenitally   narrow cervical spinal canal.       Spinal canal narrowing, minimal at C3-4. Foraminal narrowing, mild to moderate at right C3-4, mild at left C5-6 and   left C6-7, minimal at left C3-4. MRI thoracic spine:       No acute abnormality in the thoracic spine. No significant spinal canal or foraminal stenosis. Skin:     General: Skin is warm and dry. Capillary Refill: Capillary refill takes less than 2 seconds. Nails: There is no clubbing. Neurological:      Mental Status: She is alert and oriented to person, place, and time. GCS: GCS eye subscore is 4. GCS verbal subscore is 5. GCS motor subscore is 6. Cranial Nerves: No cranial nerve deficit. Psychiatric:         Attention and Perception: Attention and perception normal.         Mood and Affect: Mood and affect normal.         Speech: Speech normal.         Behavior: Behavior is cooperative. Cognition and Memory: Cognition normal.         Judgment: Judgment normal. Judgment is not impulsive or inappropriate. Assessment:        Diagnosis Orders   1.  Cervical radiculopathy  ketorolac (TORADOL) injection 60 mg   2. Cervicalgia      3. Myofascial pain      4. Neuroforaminal stenosis of cervical spine                          Plan:    Toradol 60mg IM x1. Celebrex 100mg bid. Cervical and thoracic MRI reviewed, schedule left C7 transforaminal. Consider TPI's upper trap if no significant relief.                     RAMON Navas - CNP                            Routing History

## 2020-09-18 NOTE — OP NOTE
outcomes. Alternative treatments were also discussed. DISCUSSION: The patient was sterilely prepped and draped in the usual fashion in the right decubitus  position. Time out was verified for correct patient, side, level and procedure. SEDATION:   No conscious sedation was performed during the procedure. The patient remained awake and conversed throughout the procedure. The patient underwent pulse oximetry and blood pressure monitoring independently by a trained observer, as well as by a physician. PROCEDURE:  Under image-intensifier control, a 25 gauge needle x 2.5 inch spinal needle was guided successfully into the epidural space employing a posterior lateral/oblique approach. Needle aspiration was negative for heme or CSF. Instillation of  .5 mL of Omnipaque 240 contrast medium opacified the spinal nerve and demonstrated contiguous flow into the epidural space. No vascular spread was noted. Digital subtraction was employed to evaluate for vascular spread. The patient was monitored for any untoward reaction to contrast medium before proceeding with procedure #2. The patient did not report pain reproduction in a concordant distribution. Following needle position verification, a test dose of .5 mL of sterile lidocaine 0.5% was administered and patient monitored for any adverse effects. Then, 1 mL of   was instilled into the epidural space and the patient's response was again monitored. Finally, Dexamethasone (Decadron 10 mg/mL) 1 ml of 0.5% bupivacaine was then instilled. The patient's response was again monitored. The spinal needle was removed. The patient tolerated the procedure well and without complications and was noted to be in stable condition prior to discharge from the procedure center with discharge instructions. EBL: no blood loss    SPECIMEN: none    IMPRESSIONS:  1.  LC7 transforaminal epidurogram, epidural anesthesia and epidural steroid injection procedures accomplished without incident. RECOMMENDATIONS:  1. Complete and return Post-Procedure Pain and Activity Diary.   2. Contact the [address] for symptom exacerbation, fever or unusual symptoms. 3. Post-procedure care according to verbal and written discharge instructions    POST-PROCEDURE EPIDUROGRAPHY INTERPRETATION:    EXAMINATION: AP, lateral, and oblique views    FLUORO TIME: 23 seconds    DISCUSSION: Spot views of the spine reveal normal alignment and segmentation. Spinal needle is positioned at the LC7 neuroforamin. Contrast spreads and outlines the LC7 nerve/ neuroforamin and epidural space. The epidurogram reveals excellent contrast flow. Visualized spine reveals See radiology report. Soft tissues reveal no abnormalities. IMPRESSION: LC7 transforaminal epidurogram/epineurogram reveals satisfactory needle position and contrast spread.      Electronically signed by Yury Patel MD on 9/18/2020 at 9:10 AM

## 2020-09-22 RX ORDER — PREGABALIN 75 MG/1
75 CAPSULE ORAL 3 TIMES DAILY
Qty: 90 CAPSULE | Refills: 3 | Status: SHIPPED | OUTPATIENT
Start: 2020-09-22 | End: 2020-10-02

## 2020-10-02 ENCOUNTER — OFFICE VISIT (OUTPATIENT)
Dept: PAIN MANAGEMENT | Age: 35
End: 2020-10-02
Payer: MEDICARE

## 2020-10-02 VITALS
WEIGHT: 262 LBS | HEIGHT: 64 IN | HEART RATE: 94 BPM | SYSTOLIC BLOOD PRESSURE: 138 MMHG | BODY MASS INDEX: 44.73 KG/M2 | DIASTOLIC BLOOD PRESSURE: 86 MMHG

## 2020-10-02 PROCEDURE — G8417 CALC BMI ABV UP PARAM F/U: HCPCS | Performed by: NURSE PRACTITIONER

## 2020-10-02 PROCEDURE — G8484 FLU IMMUNIZE NO ADMIN: HCPCS | Performed by: NURSE PRACTITIONER

## 2020-10-02 PROCEDURE — G8427 DOCREV CUR MEDS BY ELIG CLIN: HCPCS | Performed by: NURSE PRACTITIONER

## 2020-10-02 PROCEDURE — 99214 OFFICE O/P EST MOD 30 MIN: CPT | Performed by: NURSE PRACTITIONER

## 2020-10-02 PROCEDURE — 99213 OFFICE O/P EST LOW 20 MIN: CPT | Performed by: NURSE PRACTITIONER

## 2020-10-02 PROCEDURE — 4004F PT TOBACCO SCREEN RCVD TLK: CPT | Performed by: NURSE PRACTITIONER

## 2020-10-02 RX ORDER — CELECOXIB 200 MG/1
200 CAPSULE ORAL 2 TIMES DAILY
Qty: 60 CAPSULE | Refills: 3 | Status: SHIPPED | OUTPATIENT
Start: 2020-10-02 | End: 2021-01-20 | Stop reason: SDUPTHER

## 2020-10-02 RX ORDER — PREGABALIN 200 MG/1
200 CAPSULE ORAL 2 TIMES DAILY
Qty: 60 CAPSULE | Refills: 2 | Status: SHIPPED | OUTPATIENT
Start: 2020-10-02 | End: 2020-10-19 | Stop reason: DRUGHIGH

## 2020-10-02 ASSESSMENT — ENCOUNTER SYMPTOMS
RESPIRATORY NEGATIVE: 1
GASTROINTESTINAL NEGATIVE: 1

## 2020-10-02 NOTE — PROGRESS NOTES
Subjective:      Patient ID: Demian Spann is a 28 y.o. female. Chief Complaint   Patient presents with    Neck Pain     follow up post TFE. pt states pain has increased since the TFE and says the Lyrica is not enough     Shoulder Pain     left shoulder. Pt states pain has increased and burns from left neck down left arm      Neck Pain    Associated symptoms include numbness. Shoulder Pain    Associated symptoms include numbness. follow up after Initial new patient consult for neck pain. Since last visit, undergone left C7 TFE x2 without relief, actually aggravated her pain. She was evaluated by neurosurgery, recommended EMG, she is hesitant due to painful cervical epidural. She does have follow up scheduled with neurosurgery    Failed PT, no dry needling. Pain radiates from neck down into left arm, accompanied by numbness. GI upset with NSAID, Lyrica helps tingling down left arm. Pain Assessment  Location of Pain: Shoulder  Location Modifiers: Left, Superior, Inferior, Medial  Severity of Pain: 8  Quality of Pain: Throbbing, Other (Comment)(burning )  Duration of Pain: Persistent  Frequency of Pain: Constant  Aggravating Factors: Bending, Straightening, Stretching, Other (Comment)(lifting )  Limiting Behavior: Yes  Result of Injury: Yes  Work-Related Injury: No  Are there other pain locations you wish to document?: No    Allergies   Allergen Reactions    Norco [Hydrocodone-Acetaminophen] Other (See Comments)     Patient stated it gave her severe headaches       Outpatient Medications Marked as Taking for the 10/2/20 encounter (Office Visit) with RAMON Martinez CNP   Medication Sig Dispense Refill    pregabalin (LYRICA) 200 MG capsule Take 1 capsule by mouth 2 times daily for 30 days.  60 capsule 2    celecoxib (CELEBREX) 200 MG capsule Take 1 capsule by mouth 2 times daily 60 capsule 3    mirtazapine (REMERON) 7.5 MG tablet Take 7.5 mg by mouth nightly      prazosin (MINIPRESS) 1 MG capsule Take 1 mg by mouth nightly      ziprasidone (GEODON) 20 MG capsule Take 20 mg by mouth 2 times daily (with meals)      omeprazole (PRILOSEC OTC) 20 MG tablet Take 1 tablet by mouth daily 30 tablet 1    fexofenadine (ALLEGRA ALLERGY) 180 MG tablet Take 1 tablet by mouth daily 30 tablet 3    fluticasone (FLOVENT HFA) 44 MCG/ACT inhaler Inhale 2 puffs into the lungs 2 times daily 1 Inhaler 3    fluticasone (FLONASE) 50 MCG/ACT nasal spray SHAKE LIQUID AND USE 2 SPRAYS IN EACH NOSTRIL DAILY 16 g 3    albuterol sulfate HFA (VENTOLIN HFA) 108 (90 Base) MCG/ACT inhaler Inhale 2 puffs into the lungs every 4 hours as needed for Wheezing or Shortness of Breath 1 Inhaler 3    Multiple Vitamins-Minerals (MULTIVITAMIN WOMEN PO) Take 1 tablet by mouth daily      hydrOXYzine (VISTARIL) 25 MG capsule Take 25 mg by mouth 3 times daily as needed for Anxiety         Past Medical History:   Diagnosis Date    Allergic rhinitis     Anemia     Asthma     Depression     Headache     Herniated intervertebral disc of lumbar spine     Shortness of breath     Tobacco abuse        Past Surgical History:   Procedure Laterality Date     SECTION      PAIN MANAGEMENT PROCEDURE Left 2020    Left C7 TRANSFORAMINAL performed by Lyudmila Goodman MD at [address] [facility] Left 2020    Left C7 TRANSFORAMINAL performed by Lyudmila Goodman MD at [address] [facility]      TYMPANOSTOMY [facility]       Family History   Problem Relation Age of Onset    Heart Disease Mother     Other Mother         respiratory illness    Kidney Disease Mother     Early Death Mother 45        cardiac arrest    Heart Disease Father     High Blood Pressure Father     Other Father         liver/intestinal    Lung Cancer Maternal Grandmother     Diabetes Other     Early Death Other     Other Other         respiratory illness    Early Death Other     Other Other         respiratory illness    Bleeding Prob Other     Thyroid Disease Other        Social History     Socioeconomic History    Marital status: Single     Spouse name: None    Number of children: None    Years of education: None    Highest education level: None   Occupational History    None   Social Needs    Financial resource strain: None    Food insecurity     Worry: None     Inability: None    Transportation needs     Medical: None     Non-medical: None   Tobacco Use    Smoking status: Current Every Day Smoker     Packs/day: 0.50     Years: 10.00     Pack years: 5.00     Types: E-Cigarettes, Cigarettes     Start date: 1/1/2004    Smokeless tobacco: Never Used    Tobacco comment: Pt advises will see PCP when ready to quit; no longer vapes. Substance and Sexual Activity    Alcohol use: Yes     Frequency: Monthly or less     Drinks per session: 1 or 2     Binge frequency: Never    Drug use: Yes     Types: Marijuana    Sexual activity: Yes     Partners: Male     Birth control/protection: Surgical   Lifestyle    Physical activity     Days per week: None     Minutes per session: None    Stress: None   Relationships    Social connections     Talks on phone: None     Gets together: None     Attends Rastafari service: None     Active member of club or organization: None     Attends meetings of clubs or organizations: None     Relationship status: None    Intimate partner violence     Fear of current or ex partner: None     Emotionally abused: None     Physically abused: None     Forced sexual activity: None   Other Topics Concern    None   Social History Narrative    None     Review of Systems   Constitutional: Positive for activity change. Respiratory: Negative. Cardiovascular: Negative. Gastrointestinal: Negative. Genitourinary: Negative. Musculoskeletal: Positive for arthralgias, myalgias and neck pain. Skin: Negative.     Neurological: Positive for numbness. Psychiatric/Behavioral: Positive for sleep disturbance. Objective:   Physical Exam  Vitals signs reviewed. Constitutional:       General: She is not in acute distress. Appearance: She is well-developed. She is not ill-appearing, toxic-appearing or diaphoretic. Interventions: She is not intubated. HENT:      Head: Normocephalic and atraumatic. Neck:      Musculoskeletal: Muscular tenderness present. Pulmonary:      Effort: Pulmonary effort is normal. No tachypnea, bradypnea, accessory muscle usage, prolonged expiration, respiratory distress or retractions. She is not intubated. Musculoskeletal:      Comments: MRI OF THE CERVICAL SPINE WITHOUT CONTRAST; MRI OF THE THORACIC SPINE WITHOUT   CONTRAST 7/28/2020 2:32 pm; 7/28/2020 3:05 pm       TECHNIQUE:   Multiplanar multisequence MRI of the cervical spine was performed without the   administration of intravenous contrast.; Multiplanar multisequence MRI of the   thoracic spine was performed without the administration of intravenous   contrast.       COMPARISON:   None.       HISTORY:   ORDERING SYSTEM PROVIDED HISTORY: Neck pain, acute   TECHNOLOGIST PROVIDED HISTORY:   neck pain   Is the patient pregnant?->No   Reason for Exam:  Neck and upper back pian, left arm numbness. [facility] 6/14/2020   Acuity: Acute   Type of Exam: Initial   Mechanism of Injury: [facility] 6/14/2020; ORDERING SYSTEM PROVIDED HISTORY: Chronic   upper back pain   TECHNOLOGIST PROVIDED HISTORY:   Is the patient pregnant?->No   Reason for Exam:  Neck and upper back pian, left arm numbness. [facility] 6/14/2020   Acuity: Chronic   Type of Exam: Initial   Mechanism of Injury: [facility] 6/14/2020       FINDINGS:   MRI cervical spine:       BONES/ALIGNMENT: There is straightening of normal cervical lordosis.  There   is normal alignment of the cervical spine.  The vertebral body heights are   maintained.  The bone marrow signal appears unremarkable. Tyler Levans is no   fracture or destructive osseous lesion.  There is disc desiccation.  The   intervertebral disc heights are grossly maintained.  There is congenitally   narrow cervical spinal canal.       SPINAL CORD: There is mild heterogeneous T2 signal in the cervical spinal   cord, likely related to artifacts.       SOFT TISSUES: No paraspinal mass identified.       C2-3: There is no significant disc herniation, spinal canal stenosis or   neural foraminal narrowing.       C3-4: There is disc bulge with minimal spinal canal narrowing, minimal left   and mild-to-moderate right foraminal narrowing.       C4-5: There is disc bulge without spinal canal or foraminal narrowing.       C5-6: There is disc bulge with mild left foraminal narrowing.  No spinal   canal narrowing.       C6-7: There is no significant disc herniation or spinal canal narrowing. There is mild left foraminal narrowing.       C7-T1: There is no significant disc herniation, spinal canal stenosis or   neural foraminal narrowing.       MRI thoracic spine:       BONES/ALIGNMENT: There is normal alignment of the thoracic spine.  The   vertebral body heights are maintained. The bone marrow signal appears   unremarkable.  There is no acute fracture or destructive osseous lesion.       SPINAL CORD: There is mild heterogeneous T2 signal in the thoracic spinal   cord, likely related to artifacts.  The thoracic spinal cord is otherwise   within normal limits.       SOFT TISSUES: No paraspinal mass identified.       Degenerative disc disease: There is degenerative disc disease with endplate   changes and endplate osteophyte formation.  There are Schmorl's nodes at   multiple endplates.  There is right paracentral disc protrusion at T6-7,   without spinal canal or foraminal stenosis.            Impression   MRI cervical spine:       Straightening of normal cervical lordosis.       Degenerative disc disease as described above, exacerbating congenitally   narrow cervical spinal canal.       Spinal canal narrowing, minimal at C3-4.       Foraminal narrowing, mild to moderate at right C3-4, mild at left C5-6 and   left C6-7, minimal at left C3-4.       MRI thoracic spine:       No acute abnormality in the thoracic spine.       No significant spinal canal or foraminal stenosis.            Skin:     General: Skin is warm and dry. Capillary Refill: Capillary refill takes less than 2 seconds. Nails: There is no clubbing. Neurological:      Mental Status: She is alert and oriented to person, place, and time. GCS: GCS eye subscore is 4. GCS verbal subscore is 5. GCS motor subscore is 6. Cranial Nerves: No cranial nerve deficit. Psychiatric:         Attention and Perception: Attention and perception normal.         Mood and Affect: Mood and affect normal.         Speech: Speech normal.         Behavior: Behavior is cooperative. Cognition and Memory: Cognition normal.         Judgment: Judgment normal. Judgment is not impulsive or inappropriate. Assessment:       Diagnosis Orders   1. Myofascial pain  pregabalin (LYRICA) 200 MG capsule   2. Cervical radiculopathy  pregabalin (LYRICA) 200 MG capsule   3. Cervicalgia  pregabalin (LYRICA) 200 MG capsule   4. Neuroforaminal stenosis of cervical spine  pregabalin (LYRICA) 200 MG capsule           Plan:    No side effects with celebrex, will increase 200mg bid. Increase lyrica 200mg bid.  Follow up one month        RAMON Banuelos - CNP

## 2020-10-07 ENCOUNTER — TELEPHONE (OUTPATIENT)
Dept: PAIN MANAGEMENT | Age: 35
End: 2020-10-07

## 2020-10-07 NOTE — TELEPHONE ENCOUNTER
Spoke with patient she was only doing Lyrica BID, she was notified to increased to TID; pt stated that her script wuld be short if she did that, writer let her know that she could call in when she has roughly 5 days left and request the new one, reminding us that she is doing it TID.   Pt verbalized understanding

## 2020-10-07 NOTE — TELEPHONE ENCOUNTER
Is she talking about Lyrica? If so, she can take tid.  Okay to put new Rx in for approval. Any further changes will need OV

## 2020-10-09 ENCOUNTER — TELEPHONE (OUTPATIENT)
Dept: PAIN MANAGEMENT | Age: 35
End: 2020-10-09

## 2020-10-12 NOTE — TELEPHONE ENCOUNTER
Received letter from [facility] stating that the celebrex has been denied for the following reasons:    1) pt needs to have an inadequate treatment response with a 30 day trial of at least two non-gastroprotective NSAID  2) is there a hx of bleeding ulcer, coagulation disorder, PUD, on blood thinner? Preferred medications would be diclofenac, ketorolac, meloxicam, naproxen, daypro, motrin    Writer attempted to call number [phone] and recording stated there were caling restrictions that were keeping phone call from going through. How would you like to proceed?

## 2020-10-18 ENCOUNTER — PATIENT MESSAGE (OUTPATIENT)
Dept: PAIN MANAGEMENT | Age: 35
End: 2020-10-18

## 2020-10-19 ENCOUNTER — PATIENT MESSAGE (OUTPATIENT)
Dept: PAIN MANAGEMENT | Age: 35
End: 2020-10-19

## 2020-10-19 RX ORDER — PREGABALIN 200 MG/1
200 CAPSULE ORAL 3 TIMES DAILY
Qty: 90 CAPSULE | Refills: 2 | Status: SHIPPED | OUTPATIENT
Start: 2020-10-19 | End: 2021-01-21 | Stop reason: SDUPTHER

## 2020-10-19 NOTE — TELEPHONE ENCOUNTER
From: Sen Sanabria  To: Anika Santa, APRN - CNP  Sent: 10/18/2020 8:22 PM EDT  Subject: Prescription Question    The insurance company denied the prior auth for the celebrex I was taking two of the 100mg at a time because u had upped the script to 200mg but now i am low and have no script to get because of the insurance company denying it .  Also the pain in my shoulder is bad really bad now that I'm back to work and I'm waking up with my hands numb and in so much pain I can not move them I need something to help the pain the NSAIDS you have been prescribing help with the inflammation but they tear up my stomach and cause burning and nausea I need something to help me please help me so I can continue to work everyday please Also you upped my lyrica to 3 times a day and I'm getting low on those as well now i need a new script called in soon please

## 2020-10-20 NOTE — TELEPHONE ENCOUNTER
Received letter from [facility] that they have approved the celebrex from 10/19/2020 - 10/19/2021.     Attempted to call the patient, mailbox is full    Writer called pharmacy to let them know that it has been approved    [facility] has been notifed

## 2020-10-20 NOTE — TELEPHONE ENCOUNTER
Spoke with the patient. She stated that the Celebrex burns her stomach when increased to 200mg. Please advise if there is anything different that can be sent in.

## 2020-10-20 NOTE — TELEPHONE ENCOUNTER
From: Tee Madden  To: RAMON Yi CNP  Sent: 10/19/2020 4:52 PM EDT  Subject: Prescription Question    I need something for pain Lavinia Sapp please the lyrica alone is not gonna work please help me . .. ----- Message -----   From:MOSES Lainez   FUOT:17/58/5544 9:10 AM EDT   To:Yeimi Juárez Saliva   Subject:RE: Prescription Question    We are sending a letter to your insurance company to appeal their denial for the celebrex. As for the Lyrica we will be sending over a new script for that later this afternoon. ----- Message -----   From:Yeimi Cifuentes   Sent:10/18/2020 8:22 PM EDT   To:RAMON Her CNP   Subject:Prescription Question    The insurance company denied the prior auth for the celebrex I was taking two of the 100mg at a time because u had upped the script to 200mg but now i am low and have no script to get because of the insurance company denying it .  Also the pain in my shoulder is bad really bad now that I'm back to work and I'm waking up with my hands numb and in so much pain I can not move them I need something to help the pain the NSAIDS you have been prescribing help with the inflammation but they tear up my stomach and cause burning and nausea I need something to help me please help me so I can continue to work everyday please Also you upped my lyrica to 3 times a day and I'm getting low on those as well now i need a new script called in soon please

## 2020-10-20 NOTE — TELEPHONE ENCOUNTER
Neurosurgery recommended EMG. Please update me on when and if or scheduled? If not, it needs done so I have a better idea on how to manage her pain when I know exactly what I'm treating.

## 2020-10-26 ENCOUNTER — TELEPHONE (OUTPATIENT)
Dept: PAIN MANAGEMENT | Age: 35
End: 2020-10-26

## 2020-10-26 NOTE — TELEPHONE ENCOUNTER
Last Appt:  10/2/2020  Next Appt:   11/2/2020  Med verified in Epic      Pt called the office asking if you were willing to \"cut her hours\" at work. She feels this would give her relief with her pain and help her to complete some of the pain relief options you guys have discussed already. She feels she could get procedures done and not have to call the office so often. Please advise.

## 2020-10-26 NOTE — TELEPHONE ENCOUNTER
Pt called to ask her hrs be cut to 8 hrs a day so she is able to make her appts and procedures. Pt is currently working 12 hrs day 7 days a week.  Pt can  the note    [phone]    Last Appt:  10/2/2020  Next Appt:   11/2/2020  Med verified in [place]

## 2020-11-03 ENCOUNTER — OFFICE VISIT (OUTPATIENT)
Dept: PAIN MANAGEMENT | Age: 35
End: 2020-11-03
Payer: MEDICARE

## 2020-11-03 ENCOUNTER — HOSPITAL ENCOUNTER (OUTPATIENT)
Dept: GENERAL RADIOLOGY | Age: 35
Discharge: HOME | End: 2020-11-05
Payer: MEDICARE

## 2020-11-03 VITALS
HEART RATE: 78 BPM | OXYGEN SATURATION: 99 % | WEIGHT: 240 LBS | SYSTOLIC BLOOD PRESSURE: 122 MMHG | DIASTOLIC BLOOD PRESSURE: 78 MMHG | BODY MASS INDEX: 40.97 KG/M2 | HEIGHT: 64 IN | TEMPERATURE: 96.8 F

## 2020-11-03 PROCEDURE — 73562 X-RAY EXAM OF KNEE 3: CPT

## 2020-11-03 PROCEDURE — 99213 OFFICE O/P EST LOW 20 MIN: CPT | Performed by: NURSE PRACTITIONER

## 2020-11-03 PROCEDURE — 4004F PT TOBACCO SCREEN RCVD TLK: CPT | Performed by: NURSE PRACTITIONER

## 2020-11-03 PROCEDURE — G8417 CALC BMI ABV UP PARAM F/U: HCPCS | Performed by: NURSE PRACTITIONER

## 2020-11-03 PROCEDURE — G8484 FLU IMMUNIZE NO ADMIN: HCPCS | Performed by: NURSE PRACTITIONER

## 2020-11-03 PROCEDURE — G8427 DOCREV CUR MEDS BY ELIG CLIN: HCPCS | Performed by: NURSE PRACTITIONER

## 2020-11-03 PROCEDURE — 99214 OFFICE O/P EST MOD 30 MIN: CPT | Performed by: NURSE PRACTITIONER

## 2020-11-03 ASSESSMENT — ENCOUNTER SYMPTOMS
RESPIRATORY NEGATIVE: 1
GASTROINTESTINAL NEGATIVE: 1

## 2020-11-03 NOTE — PROGRESS NOTES
times daily as needed for Anxiety         Past Medical History:   Diagnosis Date    Allergic rhinitis     Anemia     Asthma     Depression     Headache     Herniated intervertebral disc of lumbar spine     Shortness of breath     Tobacco abuse        Past Surgical History:   Procedure Laterality Date     SECTION  2008    PAIN MANAGEMENT PROCEDURE Left 2020    Left C7 TRANSFORAMINAL performed by Jaz Pagan MD at [facility] Left 2020    Left C7 TRANSFORAMINAL performed by Jaz Pagan MD at [address] [facility]      TYMPANOSTOMY TUBE PLACEMENT         Family History   Problem Relation Age of Onset    Heart Disease Mother     Other Mother         respiratory illness    Kidney Disease Mother     Early Death Mother 45        cardiac arrest    Heart Disease Father     High Blood Pressure Father     Other Father         liver/intestinal    Lung Cancer Maternal Grandmother     Diabetes Other     Early Death Other     Other Other         respiratory illness    Early Death Other     Other Other         respiratory illness    Bleeding Prob Other     Thyroid Disease Other        Social History     Socioeconomic History    Marital status: Single     Spouse name: None    Number of children: None    Years of education: None    Highest education level: None   Occupational History    None   Social Needs    Financial resource strain: None    Food insecurity     Worry: None     Inability: None    Transportation needs     Medical: None     Non-medical: None   Tobacco Use    Smoking status: Current Every Day Smoker     Packs/day: 0.50     Years: 10.00     Pack years: 5.00     Types: E-Cigarettes, Cigarettes     Start date: 2004    Smokeless tobacco: Never Used    Tobacco comment: Pt advises will see PCP when ready to quit; no longer vapes.      Substance and Sexual Activity    Alcohol use: Yes     Frequency: Monthly or less     Drinks per session: 1 or 2     Binge frequency: Never    Drug use: Yes     Types: Marijuana    Sexual activity: Yes     Partners: Male     Birth control/protection: Surgical   Lifestyle    Physical activity     Days per week: None     Minutes per session: None    Stress: None   Relationships    Social connections     Talks on phone: None     Gets together: None     Attends Episcopalian service: None     Active member of club or organization: None     Attends meetings of clubs or organizations: None     Relationship status: None    Intimate partner violence     Fear of current or ex partner: None     Emotionally abused: None     Physically abused: None     Forced sexual activity: None   Other Topics Concern    None   Social History Narrative    None     Review of Systems   Constitutional: Positive for activity change. Respiratory: Negative. Cardiovascular: Negative. Gastrointestinal: Negative. Genitourinary: Negative. Musculoskeletal: Positive for arthralgias, myalgias and neck pain. Skin: Negative. Neurological: Positive for numbness. Psychiatric/Behavioral: Positive for sleep disturbance. Objective:   Physical Exam  Vitals signs reviewed. Constitutional:       General: She is not in acute distress. Appearance: She is well-developed. She is not ill-appearing, toxic-appearing or diaphoretic. Interventions: She is not intubated. HENT:      Head: Normocephalic and atraumatic. Neck:      Musculoskeletal: Muscular tenderness present. Pulmonary:      Effort: Pulmonary effort is normal. No tachypnea, bradypnea, accessory muscle usage, prolonged expiration, respiratory distress or retractions. She is not intubated. Musculoskeletal:      Right knee: She exhibits swelling and effusion. Comments: Painful weightbearing upon standing   Skin:     General: Skin is warm and dry.       Capillary Refill: Capillary refill takes less than 2 seconds. Nails: There is no clubbing. Neurological:      Mental Status: She is alert and oriented to person, place, and time. GCS: GCS eye subscore is 4. GCS verbal subscore is 5. GCS motor subscore is 6. Cranial Nerves: No cranial nerve deficit. Psychiatric:         Attention and Perception: Attention and perception normal.         Mood and Affect: Mood and affect normal.         Speech: Speech normal.         Behavior: Behavior is cooperative. Cognition and Memory: Cognition normal.         Judgment: Judgment normal. Judgment is not impulsive or inappropriate. Assessment:       Diagnosis Orders   1. Acute pain of right knee  XR KNEE RIGHT (3 VIEWS)           Plan:    Right knee xray.  voltaren gel, consider knee joint injection        Raisa Mckeon, APRN - CNP

## 2020-11-11 ENCOUNTER — TELEPHONE (OUTPATIENT)
Dept: PAIN MANAGEMENT | Age: 35
End: 2020-11-11

## 2020-11-11 NOTE — TELEPHONE ENCOUNTER
The patient needs knee joint injections due to her recent radiology findings. She is agreeable  but would like to know if there is anyway she can schedule an appt for 4pm or later because she does not get off until 3:30 in [address]. She stated that she cannot take off any time from work. Alean Goodpasture, is there any date that you would be available to see this patient? The patient is to be call back at [phone].

## 2020-11-17 NOTE — TELEPHONE ENCOUNTER
[facility], LPN, stated that she will send a message to the patient through [employer] since she did not respond to the call

## 2020-11-19 ENCOUNTER — OFFICE VISIT (OUTPATIENT)
Dept: PRIMARY CARE CLINIC | Age: 35
End: 2020-11-19
Payer: MEDICARE

## 2020-11-19 ENCOUNTER — HOSPITAL ENCOUNTER (OUTPATIENT)
Age: 35
Setting detail: SPECIMEN
Discharge: HOME | End: 2020-11-19
Payer: MEDICARE

## 2020-11-19 VITALS
DIASTOLIC BLOOD PRESSURE: 84 MMHG | BODY MASS INDEX: 41.02 KG/M2 | SYSTOLIC BLOOD PRESSURE: 120 MMHG | OXYGEN SATURATION: 99 % | HEART RATE: 68 BPM | RESPIRATION RATE: 18 BRPM | WEIGHT: 239 LBS | TEMPERATURE: 96.2 F

## 2020-11-19 PROCEDURE — 99213 OFFICE O/P EST LOW 20 MIN: CPT | Performed by: PHYSICIAN ASSISTANT

## 2020-11-19 PROCEDURE — G8484 FLU IMMUNIZE NO ADMIN: HCPCS | Performed by: PHYSICIAN ASSISTANT

## 2020-11-19 PROCEDURE — U0003 INFECTIOUS AGENT DETECTION BY NUCLEIC ACID (DNA OR RNA); SEVERE ACUTE RESPIRATORY SYNDROME CORONAVIRUS 2 (SARS-COV-2) (CORONAVIRUS DISEASE [COVID-19]), AMPLIFIED PROBE TECHNIQUE, MAKING USE OF HIGH THROUGHPUT TECHNOLOGIES AS DESCRIBED BY CMS-2020-01-R: HCPCS

## 2020-11-19 PROCEDURE — 4004F PT TOBACCO SCREEN RCVD TLK: CPT | Performed by: PHYSICIAN ASSISTANT

## 2020-11-19 PROCEDURE — G8417 CALC BMI ABV UP PARAM F/U: HCPCS | Performed by: PHYSICIAN ASSISTANT

## 2020-11-19 PROCEDURE — G8427 DOCREV CUR MEDS BY ELIG CLIN: HCPCS | Performed by: PHYSICIAN ASSISTANT

## 2020-11-19 PROCEDURE — 99212 OFFICE O/P EST SF 10 MIN: CPT

## 2020-11-19 RX ORDER — AZITHROMYCIN 250 MG/1
250 TABLET, FILM COATED ORAL SEE ADMIN INSTRUCTIONS
Qty: 6 TABLET | Refills: 0 | Status: SHIPPED | OUTPATIENT
Start: 2020-11-19 | End: 2020-11-24

## 2020-11-19 RX ORDER — ALBUTEROL SULFATE 90 UG/1
2 AEROSOL, METERED RESPIRATORY (INHALATION) EVERY 4 HOURS PRN
Qty: 1 INHALER | Refills: 5 | Status: SHIPPED | OUTPATIENT
Start: 2020-11-19

## 2020-11-19 RX ORDER — PREDNISONE 20 MG/1
20 TABLET ORAL 2 TIMES DAILY
Qty: 10 TABLET | Refills: 0 | Status: SHIPPED | OUTPATIENT
Start: 2020-11-19 | End: 2020-11-24

## 2020-11-19 ASSESSMENT — PATIENT HEALTH QUESTIONNAIRE - PHQ9
SUM OF ALL RESPONSES TO PHQ QUESTIONS 1-9: 1
SUM OF ALL RESPONSES TO PHQ QUESTIONS 1-9: 1
2. FEELING DOWN, DEPRESSED OR HOPELESS: 1
SUM OF ALL RESPONSES TO PHQ QUESTIONS 1-9: 1

## 2020-11-19 ASSESSMENT — ENCOUNTER SYMPTOMS
DIARRHEA: 0
NAUSEA: 1
COUGH: 1
SHORTNESS OF BREATH: 1
VOMITING: 0
WHEEZING: 0
RHINORRHEA: 1
SORE THROAT: 1
CHEST TIGHTNESS: 1
TROUBLE SWALLOWING: 0

## 2020-11-19 NOTE — LETTER
NOTIFICATION RETURN TO WORK / SCHOOL    11/19/2020    Ms. Sen Sanabria  [address] [place] [address]      To Whom It May Concern:    Sen Sanabria was tested for COVID-19 on 11/19, and the result is pending. She may return to work in 4-5 days, pending covid results and fever cough free for 24 hours. I recommend:return without restrictions    If there are questions or concerns, please have the patient contact our office.         Sincerely,      Florence Junior MA

## 2020-11-19 NOTE — PROGRESS NOTES
[facility]    Subjective:      Patient ID: Gretchen Pan is a 28 y.o. y.o. female. Patient is seen due to cold sx for one week. Has a red spot on right lateral upper thigh. Has fever chills, HA, is coughing. Is achy more than usual.  Has been really tired. On celebrex. Taking nyquil it helps some.           Past Medical History:   Diagnosis Date    Allergic rhinitis     Anemia     Asthma     Depression     Headache     Herniated intervertebral disc of lumbar spine     Shortness of breath     Tobacco abuse        Past Surgical History:   Procedure Laterality Date     SECTION      PAIN MANAGEMENT PROCEDURE Left 2020    Left C7 TRANSFORAMINAL performed by Shane Sauceda MD at [address] [facility] Left 2020    Left C7 TRANSFORAMINAL performed by Shane Sauceda MD at [address] [facility]      TYMPANOSTOMY TUBE PLACEMENT         Family History   Problem Relation Age of Onset    Heart Disease Mother     Other Mother         respiratory illness    Kidney Disease Mother     Early Death Mother 45        cardiac arrest    Heart Disease Father     High Blood Pressure Father     Other Father         liver/intestinal    Lung Cancer Maternal Grandmother     Diabetes Other     Early Death Other     Other Other         respiratory illness    Early Death Other     Other Other         respiratory illness    Bleeding Prob Other     Thyroid Disease Other        Allergies   Allergen Reactions    Norco [Hydrocodone-Acetaminophen] Other (See Comments)     Patient stated it gave her severe headaches       Current Outpatient Medications   Medication Sig Dispense Refill    azithromycin (ZITHROMAX) 250 MG tablet Take 1 tablet by mouth See Admin Instructions for 5 days 500mg on day 1 followed by 250mg on days 2 - 5 6 tablet 0    predniSONE (DELTASONE) 20 MG tablet Take 1 tablet by mouth 2 times daily for 5 days 10 tablet 0    albuterol sulfate HFA (VENTOLIN HFA) 108 (90 Base) MCG/ACT inhaler Inhale 2 puffs into the lungs every 4 hours as needed for Wheezing ventolin 1 Inhaler 5    diclofenac sodium (VOLTAREN) 1 % GEL Apply 2 g topically 4 times daily 5 Tube 5    celecoxib (CELEBREX) 200 MG capsule Take 1 capsule by mouth 2 times daily 60 capsule 3    mirtazapine (REMERON) 7.5 MG tablet Take 7.5 mg by mouth nightly      prazosin (MINIPRESS) 1 MG capsule Take 1 mg by mouth nightly      ziprasidone (GEODON) 20 MG capsule Take 20 mg by mouth 2 times daily (with meals)      omeprazole (PRILOSEC OTC) 20 MG tablet Take 1 tablet by mouth daily 30 tablet 1    fexofenadine (ALLEGRA ALLERGY) 180 MG tablet Take 1 tablet by mouth daily 30 tablet 3    fluticasone (FLOVENT HFA) 44 MCG/ACT inhaler Inhale 2 puffs into the lungs 2 times daily 1 Inhaler 3    fluticasone (FLONASE) 50 MCG/ACT nasal spray SHAKE LIQUID AND USE 2 SPRAYS IN EACH NOSTRIL DAILY 16 g 3    albuterol sulfate HFA (VENTOLIN HFA) 108 (90 Base) MCG/ACT inhaler Inhale 2 puffs into the lungs every 4 hours as needed for Wheezing or Shortness of Breath 1 Inhaler 3    Multiple Vitamins-Minerals (MULTIVITAMIN WOMEN PO) Take 1 tablet by mouth daily      hydrOXYzine (VISTARIL) 25 MG capsule Take 25 mg by mouth 3 times daily as needed for Anxiety      pregabalin (LYRICA) 200 MG capsule Take 1 capsule by mouth 3 times daily for 30 days. 90 capsule 2     No current facility-administered medications for this visit. Review of Systems   Constitutional: Positive for appetite change, chills, diaphoresis, fatigue and fever. Taking tylenol for fever. HENT: Positive for congestion, postnasal drip, rhinorrhea, sneezing and sore throat. Negative for trouble swallowing. Respiratory: Positive for cough, chest tightness and shortness of breath. Negative for wheezing. Using inhalers more than usual.  Cough keeps awake. Laying flat bothers. rales.  Abdominal:      General: Bowel sounds are normal. There is no distension. Palpations: Abdomen is soft. There is no mass. Tenderness: There is no abdominal tenderness. There is no guarding or rebound. Hernia: No hernia is present. Musculoskeletal:         General: No swelling, tenderness, deformity or signs of injury. Right lower leg: No edema. Left lower leg: No edema. Lymphadenopathy:      Cervical: No cervical adenopathy. Skin:     General: Skin is warm and dry. Findings: No erythema or rash. Neurological:      General: No focal deficit present. Mental Status: She is alert and oriented to person, place, and time. Sensory: No sensory deficit. Gait: Gait normal.   Psychiatric:         Mood and Affect: Mood normal.         Behavior: Behavior normal.           Assessment & Plan:     1. Viral illness    - COVID-19 Ambulatory; Future    2. Acute bronchitis, unspecified organism    - azithromycin (ZITHROMAX) 250 MG tablet; Take 1 tablet by mouth See Admin Instructions for 5 days 500mg on day 1 followed by 250mg on days 2 - 5  Dispense: 6 tablet; Refill: 0  - predniSONE (DELTASONE) 20 MG tablet; Take 1 tablet by mouth 2 times daily for 5 days  Dispense: 10 tablet; Refill: 0  - albuterol sulfate HFA (VENTOLIN HFA) 108 (90 Base) MCG/ACT inhaler; Inhale 2 puffs into the lungs every 4 hours as needed for Wheezing ventolin  Dispense: 1 Inhaler; Refill: 5    3. Mild intermittent asthma without complication    - albuterol sulfate HFA (VENTOLIN HFA) 108 (90 Base) MCG/ACT inhaler; Inhale 2 puffs into the lungs every 4 hours as needed for Wheezing ventolin  Dispense: 1 Inhaler;  Refill: 5      Fluids nsaids rest  Follow up not improving or worsens  Answered her questions  Will be notified of results  Discussed quarantine  Work note given  OTC medications prn    [address] [facility]  11/19/2020 6:46 PM EST    (Pleasenote that portions of this note were completed with a voice recognition program.Efforts were made to edit the dictations but occasionally words are mis-transcribed.)

## 2020-11-23 ENCOUNTER — HOSPITAL ENCOUNTER (OUTPATIENT)
Age: 35
Setting detail: SPECIMEN
Discharge: HOME | End: 2020-11-23
Payer: MEDICARE

## 2020-11-23 ENCOUNTER — HOSPITAL ENCOUNTER (OUTPATIENT)
Dept: GENERAL RADIOLOGY | Age: 35
Discharge: HOME | End: 2020-11-25
Payer: MEDICARE

## 2020-11-23 ENCOUNTER — OFFICE VISIT (OUTPATIENT)
Dept: PRIMARY CARE CLINIC | Age: 35
End: 2020-11-23
Payer: MEDICARE

## 2020-11-23 ENCOUNTER — HOSPITAL ENCOUNTER (OUTPATIENT)
Age: 35
Discharge: HOME | End: 2020-11-25
Payer: MEDICARE

## 2020-11-23 VITALS
RESPIRATION RATE: 18 BRPM | HEART RATE: 65 BPM | OXYGEN SATURATION: 100 % | WEIGHT: 237 LBS | BODY MASS INDEX: 40.46 KG/M2 | TEMPERATURE: 97.1 F | DIASTOLIC BLOOD PRESSURE: 80 MMHG | HEIGHT: 64 IN | SYSTOLIC BLOOD PRESSURE: 128 MMHG

## 2020-11-23 LAB — SARS-COV-2, NAA: NOT DETECTED

## 2020-11-23 PROCEDURE — 71046 X-RAY EXAM CHEST 2 VIEWS: CPT

## 2020-11-23 PROCEDURE — U0003 INFECTIOUS AGENT DETECTION BY NUCLEIC ACID (DNA OR RNA); SEVERE ACUTE RESPIRATORY SYNDROME CORONAVIRUS 2 (SARS-COV-2) (CORONAVIRUS DISEASE [COVID-19]), AMPLIFIED PROBE TECHNIQUE, MAKING USE OF HIGH THROUGHPUT TECHNOLOGIES AS DESCRIBED BY CMS-2020-01-R: HCPCS

## 2020-11-23 PROCEDURE — 99213 OFFICE O/P EST LOW 20 MIN: CPT | Performed by: NURSE PRACTITIONER

## 2020-11-23 PROCEDURE — 99212 OFFICE O/P EST SF 10 MIN: CPT

## 2020-11-23 RX ORDER — VALACYCLOVIR HYDROCHLORIDE 1 G/1
1000 TABLET, FILM COATED ORAL 2 TIMES DAILY
Status: ON HOLD | COMMUNITY
Start: 2020-10-13 | End: 2021-04-05

## 2020-11-23 RX ORDER — PREDNISONE 20 MG/1
20 TABLET ORAL 2 TIMES DAILY
Qty: 10 TABLET | Refills: 0 | Status: SHIPPED | OUTPATIENT
Start: 2020-11-23 | End: 2020-11-28

## 2020-11-23 RX ORDER — ALBUTEROL SULFATE 2.5 MG/3ML
2.5 SOLUTION RESPIRATORY (INHALATION) EVERY 4 HOURS PRN
Qty: 120 EACH | Refills: 0 | Status: SHIPPED | OUTPATIENT
Start: 2020-11-23

## 2020-11-23 RX ORDER — LAMOTRIGINE 25 MG/1
2 TABLET ORAL DAILY
Status: ON HOLD | COMMUNITY
Start: 2020-10-14 | End: 2021-04-05

## 2020-11-23 ASSESSMENT — ENCOUNTER SYMPTOMS
SORE THROAT: 0
SHORTNESS OF BREATH: 1
NAUSEA: 0
WHEEZING: 1
RHINORRHEA: 1
DIARRHEA: 1
CHEST TIGHTNESS: 1
VOMITING: 0

## 2020-11-23 NOTE — PROGRESS NOTES
[facility]  [facility]. [place]  [place] [address]  Dept: [phone]  Dept Fax: [phone]  Loc: [phone]        [address] [place] [address]       Chief Complaint   Patient presents with    Shortness of Breath     SOB, fever (101.2), chills, dizziness, & wheezing x7 days; +COVID exp; per [facility] retest.       Nurses Notes reviewed and I agree except as noted in the HPI. HISTORY OF PRESENT ILLNESS   Celestino Henley is a 28 y.o. female who presents to [facility] today (11/23/2020) for evaluation of:   Pt here for further evaluation of symptoms; pt was seen previously on 11/19/2020 and tested for covid and dx with bronchitis. Pt was treated with azithromycin, prednisone, and albuterol inh. Pt states her family was seen as well and all members have tested positive for covid but her test cam back negative. Pt states she does not feel she is getting any better and feels her SOB is worsening. Pt still experiencing fever, chills, and wheezing despite using albuterol inhaler and taking BID prednisone. Shortness of Breath   This is a new problem. The current episode started in the past 7 days. The problem occurs constantly. The problem has been gradually worsening. Associated symptoms include chest pain (tightness), a fever (101.2 today 3pm), headaches, PND, rhinorrhea and wheezing. Pertinent negatives include no sore throat or vomiting. Nothing aggravates the symptoms. Associated symptoms comments: Cough, congestion. She has tried beta agonist inhalers and oral steroids (antibiotics) for the symptoms. Her past medical history is significant for asthma. REVIEW OF SYSTEMS     Review of Systems   Constitutional: Positive for fatigue and fever (101.2 today 3pm). HENT: Positive for congestion, postnasal drip and rhinorrhea. Negative for sore throat. Respiratory: Positive for chest tightness, shortness of breath and wheezing. Cardiovascular: Positive for chest pain (tightness) and PND. Gastrointestinal: Positive for diarrhea. Negative for nausea and vomiting. Neurological: Positive for headaches. PAST MEDICAL HISTORY         Diagnosis Date    Allergic rhinitis     Anemia     Asthma     Depression     Headache     Herniated intervertebral disc of lumbar spine     Shortness of breath     Tobacco abuse        SURGICAL HISTORY     Patient  has a past surgical history that includes Tonsillectomy and adenoidectomy (); Tympanostomy tube placement ();  section (); Tubal ligation (); Pain management procedure (Left, 2020); and Pain management procedure (Left, 2020).     CURRENT MEDICATIONS       Outpatient Medications Prior to Visit   Medication Sig Dispense Refill    valACYclovir (VALTREX) 1 g tablet Take 1,000 mg by mouth 2 times daily      lamoTRIgine (LAMICTAL) 25 MG tablet Take 2 tablets by mouth daily      azithromycin (ZITHROMAX) 250 MG tablet Take 1 tablet by mouth See Admin Instructions for 5 days 500mg on day 1 followed by 250mg on days 2 - 5 6 tablet 0    predniSONE (DELTASONE) 20 MG tablet Take 1 tablet by mouth 2 times daily for 5 days 10 tablet 0    albuterol sulfate HFA (VENTOLIN HFA) 108 (90 Base) MCG/ACT inhaler Inhale 2 puffs into the lungs every 4 hours as needed for Wheezing ventolin 1 Inhaler 5    diclofenac sodium (VOLTAREN) 1 % GEL Apply 2 g topically 4 times daily 5 Tube 5    celecoxib (CELEBREX) 200 MG capsule Take 1 capsule by mouth 2 times daily 60 capsule 3    mirtazapine (REMERON) 7.5 MG tablet Take 7.5 mg by mouth nightly      prazosin (MINIPRESS) 1 MG capsule Take 1 mg by mouth nightly      ziprasidone (GEODON) 20 MG capsule Take 20 mg by mouth 2 times daily (with meals)      omeprazole (PRILOSEC OTC) 20 MG tablet Take 1 tablet by mouth daily 30 tablet 1    fexofenadine (ALLEGRA ALLERGY) 180 MG tablet Take 1 tablet by mouth daily 30 tablet 3    fluticasone (FLOVENT HFA) 44 MCG/ACT inhaler Inhale 2 puffs into the lungs 2 times daily 1 Inhaler 3    fluticasone (FLONASE) 50 MCG/ACT nasal spray SHAKE LIQUID AND USE 2 SPRAYS IN EACH NOSTRIL DAILY 16 g 3    albuterol sulfate HFA (VENTOLIN HFA) 108 (90 Base) MCG/ACT inhaler Inhale 2 puffs into the lungs every 4 hours as needed for Wheezing or Shortness of Breath 1 Inhaler 3    Multiple Vitamins-Minerals (MULTIVITAMIN WOMEN PO) Take 1 tablet by mouth daily      hydrOXYzine (VISTARIL) 25 MG capsule Take 25 mg by mouth 3 times daily as needed for Anxiety      pregabalin (LYRICA) 200 MG capsule Take 1 capsule by mouth 3 times daily for 30 days. 90 capsule 2     No facility-administered medications prior to visit. ALLERGIES     Patient is is allergic to norco [hydrocodone-acetaminophen]. FAMILY HISTORY     Patient's family history includes Bleeding Prob in an other family member; Diabetes in an other family member; Early Death in some other family members; Early Death (age of onset: 45) in her mother; Heart Disease in her father and mother; High Blood Pressure in her father; Kidney Disease in her mother; Radha Members in her maternal grandmother; Other in her father, mother, and other family members; Thyroid Disease in an other family member. SOCIAL HISTORY     Patient  reports that she has been smoking e-cigarettes and cigarettes. She started smoking about 16 years ago. She has a 5.00 pack-year smoking history. She has never used smokeless tobacco. She reports current alcohol use. She reports current drug use. Drug: Marijuana. PHYSICAL EXAM     VITALS  BP: 128/80, Temp: 97.1 °F (36.2 °C), Pulse: 65, Resp: 18, SpO2: 100 %  Physical Exam  Vitals signs reviewed. Constitutional:       General: She is not in acute distress. Appearance: She is not ill-appearing. HENT:      Right Ear: Ear canal normal. No drainage. Tympanic membrane is perforated. Tympanic membrane is not erythematous.       Left Ear: Tympanic membrane and ear canal normal.      Nose: Rhinorrhea present. No congestion. Right Turbinates: Swollen (inflamed). Left Turbinates: Swollen (inflamed). Mouth/Throat:      Mouth: Mucous membranes are moist.      Pharynx: Oropharynx is clear. Posterior oropharyngeal erythema present. No oropharyngeal exudate. Eyes:      Pupils: Pupils are equal, round, and reactive to light. Neck:      Musculoskeletal: Normal range of motion and neck supple. No neck rigidity. Cardiovascular:      Rate and Rhythm: Normal rate and regular rhythm. Heart sounds: Normal heart sounds, S1 normal and S2 normal. No murmur. Pulmonary:      Effort: Pulmonary effort is normal. No accessory muscle usage, respiratory distress or retractions. Breath sounds: Examination of the right-middle field reveals wheezing and rhonchi. Examination of the left-middle field reveals wheezing and rhonchi. Examination of the right-lower field reveals decreased breath sounds and wheezing. Examination of the left-lower field reveals wheezing. Decreased breath sounds, wheezing and rhonchi present. Musculoskeletal: Normal range of motion. Lymphadenopathy:      Cervical: No cervical adenopathy. Skin:     General: Skin is warm and dry. Capillary Refill: Capillary refill takes less than 2 seconds. Neurological:      General: No focal deficit present. Mental Status: She is alert. DIAGNOSTIC RESULTS   Labs:No results found for this visit on 11/23/20. IMAGING:    Narrative    EXAMINATION:    TWO XRAY VIEWS OF THE CHEST         11/23/2020 6:42 pm         COMPARISON:    July 13, 2018         HISTORY:    ORDERING SYSTEM PROVIDED HISTORY: SOB (shortness of breath)    TECHNOLOGIST PROVIDED HISTORY:    Cough, SOB, fever x 1 week.     Reason for Exam: Cough, sob, fever, 1 week    Acuity: Acute    Type of Exam: Initial         FINDINGS:    The lungs are without acute focal process.  There is no effusion or pneumothorax. The cardiomediastinal silhouette is without acute process. The    osseous structures are without acute process.              Impression    No acute process. CLINICAL COURSE:     Vitals:    11/23/20 1807   BP: 128/80   Site: Right Upper Arm   Position: Sitting   Cuff Size: Medium Adult   Pulse: 65   Resp: 18   Temp: 97.1 °F (36.2 °C)   TempSrc: Tympanic   SpO2: 100%   Weight: 237 lb (107.5 kg)   Height: 5' 4\" (1.626 m)           PROCEDURES:  None  FINAL IMPRESSION      1. Fever, unspecified fever cause    2. Cough    3. SOB (shortness of breath)    4. Person under investigation for COVID-19    5. Close exposure to COVID-19 virus         DISPOSITION/PLAN     Discussed with pt that could possibly have received a false negative on her covid test as her symptoms are consistent with covid. Covid test collected and will notify pt of results. CXR also ordered to rule out any acute processes. Discussed with pt continuing quarantine and continuing to treat her symptoms at home such as increasing her fluids, using tylenol or ibuprofen for fever and body aches, continuing her steroids, and using albuterol for wheezing. Will send in albuterol nebulizer treatments to use at home. Instructed pt that if her symptoms worsen, please go to ER. After waiting an hour, pt declined to wait for radiology reading. 21:00--CXR shows no acute processes. Pt notified at home by provider of this result. Will also send in additional course of prednisone. Again instructed pt to monitor her symptoms and if they worsen, please go to ER for further workup. Patient Instructions     Will notify pt of covid test result. Pt should isolate at home away in an area away from family and quarantine at home until result is available    Increase your fluids. Continue your prednisone and start albuterol nebulizer every 4 hours as needed for wheezing. If your symptoms worsen, please go to ER or return to be seen.   Patient Education        Learning About Coronavirus [phone])  Coronavirus [phone]): Overview  What is coronavirus (COVID-19)? The coronavirus disease (COVID-19) is caused by a virus. It is an illness that was first found in December 2019. It has since spread worldwide. The virus can cause fever, cough, and trouble breathing. In severe cases, it can cause pneumonia and make it hard to breathe without help. It can cause death. This virus spreads person-to-person through droplets from coughing and sneezing. It can also spread when you are close to someone who is infected. And it can spread when you touch something that has the virus on it, such as a doorknob or a tabletop. Coronaviruses are a large group of viruses. They cause the common cold. They also cause more serious illnesses like Middle East respiratory syndrome (MERS) and severe acute respiratory syndrome (SARS). COVID-19 is caused by a novel coronavirus. That means it's a new type that has not been seen in people before. How is COVID-19 treated? Mild illness can be treated at home, but more serious illness needs to be treated in the hospital. Treatment may include medicines to reduce symptoms, plus breathing support such as oxygen therapy or a ventilator. Other treatments, such as antiviral medicines, may help people who have COVID-19. What can you do to protect yourself from COVID-19? The best way to protect yourself from getting sick is to:  · Avoid areas where there is an outbreak. · Avoid contact with people who may be infected. · Avoid crowds and try to stay at least 6 feet away from other people. · Wash your hands often, especially after you cough or sneeze. Use soap and water, and scrub for at least 20 seconds. If soap and water aren't available, use an alcohol-based hand . · Avoid touching your mouth, nose, and eyes. What can you do to avoid spreading the virus to others?   To help avoid spreading the virus to others:  · Wash your hands often with soap or alcohol-based hand sanitizers. · Cover your mouth with a tissue when you cough or sneeze. Then throw the tissue in the trash. · Use a disinfectant to clean things that you touch often. These include doorknobs, remote controls, phones, and handles on your refrigerator and microwave. And don't forget countertops, tabletops, bathrooms, and computer keyboards. · Wear a cloth face cover if you have to go to public areas. If you know or suspect that you have COVID-19:  · Stay home. Don't go to school, work, or public areas. And don't use public transportation, ride-shares, or taxis unless you have no choice. · Leave your home only if you need to get medical care or testing. But call the doctor's office first so they know you're coming. And wear a face cover. · Limit contact with people in your home. If possible, stay in a separate bedroom and use a separate bathroom. · Wear a face cover whenever you're around other people. It can help stop the spread of the virus when you cough or sneeze. · Clean and disinfect your home every day. Use household  and disinfectant wipes or sprays. Take special care to clean things that you grab with your hands. · Self-isolate until it's safe to be around others again. ? If you have symptoms, it's safe when you haven't had a fever for 3 days and your symptoms have improved and it's been at least 10 days since your symptoms started. ? If you were exposed to the virus but don't have symptoms, it's safe to be around others 14 days after exposure. ? Talk to your doctor about whether you also need testing, especially if you have a weakened immune system. When to call for help  Call 911 anytime you think you may need emergency care. For example, call if:  · You have severe trouble breathing. (You can't talk at all.)  · You have constant chest pain or pressure. · You are severely dizzy or lightheaded. · You are confused or can't think clearly.   · Your face and lips have a blue color. · You passed out (lost consciousness) or are very hard to wake up. Call your doctor now if you develop symptoms such as:  · Shortness of breath. · Fever. · Cough. If you need to get care, call ahead to the doctor's office for instructions before you go. Make sure you wear a face cover to prevent exposing other people to the virus. Where can you get the latest information? The following health organizations are tracking and studying this virus. Their websites contain the most up-to-date information. Nathan Velez also learn what to do if you think you may have been exposed to the virus. · U.S. Centers for Disease Control and Prevention (CDC): The CDC provides updated news about the disease and travel advice. The website also tells you how to prevent the spread of infection. www.cdc.gov  · World Health Organization [facility]): WHO offers information about the virus outbreaks. WHO also has travel advice. www.who.int  Current as of: July 10, 2020               Content Version: 12.6  © 2006-2020 [employer]. Care instructions adapted under license by [facility] ([facility]). If you have questions about a medical condition or this instruction, always ask your healthcare professional. [place] [address] any warranty or liability for your use of this information. Patient Education        Coronavirus ([id number]): Care Instructions  Overview  The coronavirus disease (COVID-19) is caused by a virus. Symptoms may include a fever, a cough, and shortness of breath. It mainly spreads person-to-person through droplets from coughing and sneezing. The virus also can spread when people are in close contact with someone who is infected. Most people have mild symptoms and can take care of themselves at home.  If their symptoms get worse, they may need care in a hospital. Treatment may include medicines to reduce symptoms, plus breathing support such as oxygen therapy or a ventilator. It's important to not spread the virus to others. If you have COVID-19, wear a face cover anytime you are around other people. You need to isolate yourself while you are sick. Leave your home only if you need to get medical care or testing. Follow-up care is a key part of your treatment and safety. Be sure to make and go to all appointments, and call your doctor if you are having problems. It's also a good idea to know your test results and keep a list of the medicines you take. How can you care for yourself at home? · Get extra rest. It can help you feel better. · Drink plenty of fluids. This helps replace fluids lost from fever. Fluids also help ease a scratchy throat. Water, soup, fruit juice, and hot tea with lemon are good choices. · Take acetaminophen (such as Tylenol) to reduce a fever. It may also help with muscle aches. Read and follow all instructions on the label. · Use petroleum jelly on sore skin. This can help if the skin around your nose and lips becomes sore from rubbing a lot with tissues. Tips for self-isolation  · Limit contact with people in your home. If possible, stay in a separate bedroom and use a separate bathroom. · Wear a cloth face cover when you are around other people. It can help stop the spread of the virus when you cough or sneeze. · If you have to leave home, avoid crowds and try to stay at least 6 feet away from other people. · Avoid contact with pets and other animals. · Cover your mouth and nose with a tissue when you cough or sneeze. Then throw it in the trash right away. · Wash your hands often, especially after you cough or sneeze. Use soap and water, and scrub for at least 20 seconds. If soap and water aren't available, use an alcohol-based hand . · Don't share personal household items. These include bedding, towels, cups and glasses, and eating utensils.   · [address] laundry in the warmest water allowed for the fabric type, and dry it completely. It's okay to wash other people's laundry with yours. · Clean and disinfect your home every day. Use household  and disinfectant wipes or sprays. Take special care to clean things that you grab with your hands. These include doorknobs, remote controls, phones, and handles on your refrigerator and microwave. And don't forget countertops, tabletops, bathrooms, and computer keyboards. When you can end self-isolation  · If you know or suspect that you have COVID-19, stay in self-isolation until:  ? You haven't had a fever for 3 days, and  ? Your symptoms have improved, and  ? It's been at least 10 days since your symptoms started. · Talk to your doctor about whether you also need testing, especially if you have a weakened immune system. When should you call for help? Call 911 anytime you think you may need emergency care. For example, call if you have life-threatening symptoms, such as:    · You have severe trouble breathing. (You can't talk at all.)     · You have constant chest pain or pressure.     · You are severely dizzy or lightheaded.     · You are confused or can't think clearly.     · Your face and lips have a blue color.     · You pass out (lose consciousness) or are very hard to wake up. Call your doctor now or seek immediate medical care if:    · You have moderate trouble breathing. (You can't speak a full sentence.)     · You are coughing up blood (more than about 1 teaspoon).     · You have signs of low blood pressure. These include feeling lightheaded; being too weak to stand; and having cold, pale, clammy skin. Watch closely for changes in your health, and be sure to contact your doctor if:    · Your symptoms get worse.     · You are not getting better as expected. Call before you go to the doctor's office. Follow their instructions. And wear a cloth face cover. Current as of: July 10, 2020               Content Version: 12.6  © 9454-7751 [employer].    Care instructions adapted under license by [facility] ([facility]). If you have questions about a medical condition or this instruction, always ask your healthcare professional. [place] [address] any warranty or liability for your use of this information. Orders Placed This Encounter   Procedures    XR CHEST STANDARD (2 VW)     Standing Status:   Future     Number of Occurrences:   1     Standing Expiration Date:   12/23/2020     Order Specific Question:   Reason for exam:     Answer:   Cough, SOB, fever x 1 week.  COVID-19 Ambulatory     Standing Status:   Future     Number of Occurrences:   1     Standing Expiration Date:   11/23/2021     Scheduling Instructions:      Saline media preferred given current shortage of viral transport media but both acceptable     Order Specific Question:   Is this test for diagnosis or screening? Answer:   Diagnosis of ill patient     Order Specific Question:   Symptomatic for COVID-19 as defined by CDC? Answer:   Yes     Order Specific Question:   Date of Symptom Onset     Answer:   11/16/2020     Order Specific Question:   Hospitalized for COVID-19? Answer:   No     Order Specific Question:   Admitted to ICU for COVID-19? Answer:   No     Order Specific Question:   Employed in healthcare setting? Answer:   No     Order Specific Question:   Resident in a congregate (group) care setting? Answer:   No     Order Specific Question:   Pregnant? Answer:   No     Order Specific Question:   Previously tested for COVID-19?      Answer:   Yes     Outpatient Encounter Medications as of 11/23/2020   Medication Sig Dispense Refill    valACYclovir (VALTREX) 1 g tablet Take 1,000 mg by mouth 2 times daily      lamoTRIgine (LAMICTAL) 25 MG tablet Take 2 tablets by mouth daily      albuterol (PROVENTIL) (2.5 MG/3ML) 0.083% nebulizer solution Take 3 mLs by nebulization every 4 hours as needed for Wheezing 120 each 0    predniSONE (DELTASONE) 20 MG tablet

## 2020-11-23 NOTE — LETTER
[address] [facility]  [address] [facility] [address]  Phone: [phone]  Fax: [phone]    RAMON Ramirez NP        November 23, 2020     Patient: Kameron Choi   YOB: 1985   Date of Visit: 11/23/2020       To Whom it May Concern:    Kameron Choi was seen in my clinic on 11/23/2020. She may return to work after a negative covid test result (results expected in appx 5-7 days) and marked symptom improvement. Pt should be fever free for 24 hours without medication. If you have any questions or concerns, please don't hesitate to call.     Sincerely,         RAMON Ramirez NP

## 2020-11-24 NOTE — PATIENT INSTRUCTIONS
Will notify pt of covid test result. Pt should isolate at home away in an area away from family and quarantine at home until result is available    Increase your fluids. Continue your prednisone and start albuterol nebulizer every 4 hours as needed for wheezing. If your symptoms worsen, please go to ER or return to be seen. Patient Education        Learning About Coronavirus [phone])  Coronavirus [phone]): Overview  What is coronavirus (NRARR-52)? The coronavirus disease (COVID-19) is caused by a virus. It is an illness that was first found in December 2019. It has since spread worldwide. The virus can cause fever, cough, and trouble breathing. In severe cases, it can cause pneumonia and make it hard to breathe without help. It can cause death. This virus spreads person-to-person through droplets from coughing and sneezing. It can also spread when you are close to someone who is infected. And it can spread when you touch something that has the virus on it, such as a doorknob or a tabletop. Coronaviruses are a large group of viruses. They cause the common cold. They also cause more serious illnesses like Middle East respiratory syndrome (MERS) and severe acute respiratory syndrome (SARS). COVID-19 is caused by a novel coronavirus. That means it's a new type that has not been seen in people before. How is COVID-19 treated? Mild illness can be treated at home, but more serious illness needs to be treated in the hospital. Treatment may include medicines to reduce symptoms, plus breathing support such as oxygen therapy or a ventilator. Other treatments, such as antiviral medicines, may help people who have COVID-19. What can you do to protect yourself from COVID-19? The best way to protect yourself from getting sick is to:  · Avoid areas where there is an outbreak. · Avoid contact with people who may be infected. · Avoid crowds and try to stay at least 6 feet away from other people.   · Wash your hands often, especially after you cough or sneeze. Use soap and water, and scrub for at least 20 seconds. If soap and water aren't available, use an alcohol-based hand . · Avoid touching your mouth, nose, and eyes. What can you do to avoid spreading the virus to others? To help avoid spreading the virus to others:  · Wash your hands often with soap or alcohol-based hand sanitizers. · Cover your mouth with a tissue when you cough or sneeze. Then throw the tissue in the trash. · Use a disinfectant to clean things that you touch often. These include doorknobs, remote controls, phones, and handles on your refrigerator and microwave. And don't forget countertops, tabletops, bathrooms, and computer keyboards. · Wear a cloth face cover if you have to go to public areas. If you know or suspect that you have COVID-19:  · Stay home. Don't go to school, work, or public areas. And don't use public transportation, ride-shares, or taxis unless you have no choice. · Leave your home only if you need to get medical care or testing. But call the doctor's office first so they know you're coming. And wear a face cover. · Limit contact with people in your home. If possible, stay in a separate bedroom and use a separate bathroom. · Wear a face cover whenever you're around other people. It can help stop the spread of the virus when you cough or sneeze. · Clean and disinfect your home every day. Use household  and disinfectant wipes or sprays. Take special care to clean things that you grab with your hands. · Self-isolate until it's safe to be around others again. ? If you have symptoms, it's safe when you haven't had a fever for 3 days and your symptoms have improved and it's been at least 10 days since your symptoms started. ? If you were exposed to the virus but don't have symptoms, it's safe to be around others 14 days after exposure.   ? Talk to your doctor about whether you also need testing, especially if you have a weakened immune system. When to call for help  Call 911 anytime you think you may need emergency care. For example, call if:  · You have severe trouble breathing. (You can't talk at all.)  · You have constant chest pain or pressure. · You are severely dizzy or lightheaded. · You are confused or can't think clearly. · Your face and lips have a blue color. · You passed out (lost consciousness) or are very hard to wake up. Call your doctor now if you develop symptoms such as:  · Shortness of breath. · Fever. · Cough. If you need to get care, call ahead to the doctor's office for instructions before you go. Make sure you wear a face cover to prevent exposing other people to the virus. Where can you get the latest information? The following health organizations are tracking and studying this virus. Their websites contain the most up-to-date information. Quinten Moraless also learn what to do if you think you may have been exposed to the virus. · U.S. Centers for Disease Control and Prevention (CDC): The CDC provides updated news about the disease and travel advice. The website also tells you how to prevent the spread of infection. www.cdc.gov  · World Health Organization [facility]): WHO offers information about the virus outbreaks. WHO also has travel advice. www.who.int  Current as of: July 10, 2020               Content Version: 12.6  © 7663-1832 [employer]. Care instructions adapted under license by [facility] ([facility]). If you have questions about a medical condition or this instruction, always ask your healthcare professional. [place] [address] any warranty or liability for your use of this information. Patient Education        Coronavirus (PLEKY-56): Care Instructions  Overview  The coronavirus disease (COVID-19) is caused by a virus. Symptoms may include a fever, a cough, and shortness of breath. It mainly spreads person-to-person through droplets from coughing and sneezing. The virus also can spread when people are in close contact with someone who is infected. Most people have mild symptoms and can take care of themselves at home. If their symptoms get worse, they may need care in a hospital. Treatment may include medicines to reduce symptoms, plus breathing support such as oxygen therapy or a ventilator. It's important to not spread the virus to others. If you have COVID-19, wear a face cover anytime you are around other people. You need to isolate yourself while you are sick. Leave your home only if you need to get medical care or testing. Follow-up care is a key part of your treatment and safety. Be sure to make and go to all appointments, and call your doctor if you are having problems. It's also a good idea to know your test results and keep a list of the medicines you take. How can you care for yourself at home? · Get extra rest. It can help you feel better. · Drink plenty of fluids. This helps replace fluids lost from fever. Fluids also help ease a scratchy throat. Water, soup, fruit juice, and hot tea with lemon are good choices. · Take acetaminophen (such as Tylenol) to reduce a fever. It may also help with muscle aches. Read and follow all instructions on the label. · Use petroleum jelly on sore skin. This can help if the skin around your nose and lips becomes sore from rubbing a lot with tissues. Tips for self-isolation  · Limit contact with people in your home. If possible, stay in a separate bedroom and use a separate bathroom. · Wear a cloth face cover when you are around other people. It can help stop the spread of the virus when you cough or sneeze. · If you have to leave home, avoid crowds and try to stay at least 6 feet away from other people. · Avoid contact with pets and other animals. · Cover your mouth and nose with a tissue when you cough or sneeze. Then throw it in the trash right away.   · Wash your hands often, especially after you cough or be sure to contact your doctor if:    · Your symptoms get worse.     · You are not getting better as expected. Call before you go to the doctor's office. Follow their instructions. And wear a cloth face cover. Current as of: July 10, 2020               Content Version: 12.6  © 2006-2020 [employer]. Care instructions adapted under license by [facility] ([facility]). If you have questions about a medical condition or this instruction, always ask your healthcare professional. [address] any warranty or liability for your use of this information.

## 2020-11-26 LAB — SARS-COV-2, NAA: DETECTED

## 2020-11-28 ENCOUNTER — TELEPHONE (OUTPATIENT)
Dept: PRIMARY CARE CLINIC | Age: 35
End: 2020-11-28

## 2020-12-17 ENCOUNTER — HOSPITAL ENCOUNTER (OUTPATIENT)
Dept: GENERAL RADIOLOGY | Age: 35
Discharge: HOME | End: 2020-12-19
Payer: MEDICARE

## 2020-12-17 ENCOUNTER — OFFICE VISIT (OUTPATIENT)
Dept: PRIMARY CARE CLINIC | Age: 35
End: 2020-12-17
Payer: MEDICARE

## 2020-12-17 VITALS
RESPIRATION RATE: 14 BRPM | BODY MASS INDEX: 38.79 KG/M2 | HEART RATE: 91 BPM | OXYGEN SATURATION: 100 % | WEIGHT: 227.2 LBS | DIASTOLIC BLOOD PRESSURE: 70 MMHG | SYSTOLIC BLOOD PRESSURE: 120 MMHG | TEMPERATURE: 97.7 F | HEIGHT: 64 IN

## 2020-12-17 LAB — S PYO AG THROAT QL: NORMAL

## 2020-12-17 PROCEDURE — 87880 STREP A ASSAY W/OPTIC: CPT | Performed by: PHYSICIAN ASSISTANT

## 2020-12-17 PROCEDURE — G8484 FLU IMMUNIZE NO ADMIN: HCPCS | Performed by: PHYSICIAN ASSISTANT

## 2020-12-17 PROCEDURE — 99212 OFFICE O/P EST SF 10 MIN: CPT | Performed by: PHYSICIAN ASSISTANT

## 2020-12-17 PROCEDURE — G8427 DOCREV CUR MEDS BY ELIG CLIN: HCPCS | Performed by: PHYSICIAN ASSISTANT

## 2020-12-17 PROCEDURE — 4004F PT TOBACCO SCREEN RCVD TLK: CPT | Performed by: PHYSICIAN ASSISTANT

## 2020-12-17 PROCEDURE — 71046 X-RAY EXAM CHEST 2 VIEWS: CPT

## 2020-12-17 PROCEDURE — 99214 OFFICE O/P EST MOD 30 MIN: CPT | Performed by: PHYSICIAN ASSISTANT

## 2020-12-17 PROCEDURE — G8417 CALC BMI ABV UP PARAM F/U: HCPCS | Performed by: PHYSICIAN ASSISTANT

## 2020-12-17 RX ORDER — LEVOFLOXACIN 500 MG/1
500 TABLET, FILM COATED ORAL DAILY
Qty: 10 TABLET | Refills: 0 | Status: SHIPPED | OUTPATIENT
Start: 2020-12-17 | End: 2020-12-27

## 2020-12-17 RX ORDER — ONDANSETRON 8 MG/1
8 TABLET, ORALLY DISINTEGRATING ORAL EVERY 8 HOURS PRN
Qty: 10 TABLET | Refills: 1 | Status: ON HOLD | OUTPATIENT
Start: 2020-12-17 | End: 2021-04-05

## 2020-12-17 RX ORDER — METHYLPREDNISOLONE 4 MG/1
TABLET ORAL
Qty: 1 KIT | Refills: 0 | Status: SHIPPED | OUTPATIENT
Start: 2020-12-17 | End: 2021-01-25 | Stop reason: ALTCHOICE

## 2020-12-17 ASSESSMENT — ENCOUNTER SYMPTOMS
VOMITING: 1
COUGH: 1
SHORTNESS OF BREATH: 1
DIARRHEA: 1
SORE THROAT: 1
NAUSEA: 1
RHINORRHEA: 1
WHEEZING: 1
CHEST TIGHTNESS: 0
TROUBLE SWALLOWING: 0

## 2020-12-17 NOTE — PROGRESS NOTES
[facility]    Subjective:      Patient ID: Miriam Baird is a 28 y.o. y.o. female. Patient is seen due to worsening covid symptoms. She just finished her quarantine 12/3/2020 and went back to work on 2020 and a few days after this her sx worsened. People at work have been sick. Appetite is down. This has been the past few days. Has diarrhea loose and watery the past few days. Lost her taste the past few days. Has runny nose congestion. She is wheezing some not as bad I gave her inhaler and using and it helps. Still has some sob. Cough is not as bad. Takes nyquil and it helps her sleep.          Past Medical History:   Diagnosis Date    Allergic rhinitis     Anemia     Asthma     Depression     Headache     Herniated intervertebral disc of lumbar spine     Shortness of breath     Tobacco abuse        Past Surgical History:   Procedure Laterality Date     SECTION      PAIN MANAGEMENT PROCEDURE Left 2020    Left C7 TRANSFORAMINAL performed by Jann Chisholm MD at [address] [facility] Left 2020    Left C7 TRANSFORAMINAL performed by Jann Chisholm MD at [address] [facility]      TYMPANOSTOMY TUBE PLACEMENT         Family History   Problem Relation Age of Onset    Heart Disease Mother     Other Mother         respiratory illness    Kidney Disease Mother     Early Death Mother 45        cardiac arrest    Heart Disease Father     High Blood Pressure Father     Other Father         liver/intestinal    Lung Cancer Maternal Grandmother     Diabetes Other     Early Death Other     Other Other         respiratory illness    Early Death Other     Other Other         respiratory illness    Bleeding Prob Other     Thyroid Disease Other        Allergies   Allergen Reactions    Norco [Hydrocodone-Acetaminophen] Other (See Comments) Patient stated it gave her severe headaches       Current Outpatient Medications   Medication Sig Dispense Refill    ondansetron (ZOFRAN ODT) 8 MG TBDP disintegrating tablet Take 1 tablet by mouth every 8 hours as needed for Nausea 10 tablet 1    methylPREDNISolone (MEDROL, BEVERLY,) 4 MG tablet Take by mouth.  1 kit 0    valACYclovir (VALTREX) 1 g tablet Take 1,000 mg by mouth 2 times daily      lamoTRIgine (LAMICTAL) 25 MG tablet Take 2 tablets by mouth daily      albuterol (PROVENTIL) (2.5 MG/3ML) 0.083% nebulizer solution Take 3 mLs by nebulization every 4 hours as needed for Wheezing 120 each 0    albuterol sulfate HFA (VENTOLIN HFA) 108 (90 Base) MCG/ACT inhaler Inhale 2 puffs into the lungs every 4 hours as needed for Wheezing ventolin 1 Inhaler 5    diclofenac sodium (VOLTAREN) 1 % GEL Apply 2 g topically 4 times daily 5 Tube 5    celecoxib (CELEBREX) 200 MG capsule Take 1 capsule by mouth 2 times daily 60 capsule 3    mirtazapine (REMERON) 7.5 MG tablet Take 7.5 mg by mouth nightly      prazosin (MINIPRESS) 1 MG capsule Take 1 mg by mouth nightly      ziprasidone (GEODON) 20 MG capsule Take 20 mg by mouth 2 times daily (with meals)      omeprazole (PRILOSEC OTC) 20 MG tablet Take 1 tablet by mouth daily 30 tablet 1    fexofenadine (ALLEGRA ALLERGY) 180 MG tablet Take 1 tablet by mouth daily 30 tablet 3    fluticasone (FLOVENT HFA) 44 MCG/ACT inhaler Inhale 2 puffs into the lungs 2 times daily 1 Inhaler 3    albuterol sulfate HFA (VENTOLIN HFA) 108 (90 Base) MCG/ACT inhaler Inhale 2 puffs into the lungs every 4 hours as needed for Wheezing or Shortness of Breath 1 Inhaler 3    Multiple Vitamins-Minerals (MULTIVITAMIN WOMEN PO) Take 1 tablet by mouth daily      hydrOXYzine (VISTARIL) 25 MG capsule Take 25 mg by mouth 3 times daily as needed for Anxiety      fluticasone (FLONASE) 50 MCG/ACT nasal spray SHAKE LIQUID AND USE 2 SPRAYS IN EACH NOSTRIL DAILY 16 g 3  pregabalin (LYRICA) 200 MG capsule Take 1 capsule by mouth 3 times daily for 30 days. 90 capsule 2     No current facility-administered medications for this visit. Review of Systems   Constitutional: Positive for appetite change, fatigue and fever. Negative for chills. Yesterday had a fever of 101.9. Always chilled. HENT: Positive for congestion, rhinorrhea and sore throat. Negative for trouble swallowing. Yesterday had a slight sore throat not as bad. Respiratory: Positive for cough, shortness of breath and wheezing. Negative for chest tightness. Cardiovascular: Negative for chest pain and palpitations. Gastrointestinal: Positive for diarrhea, nausea and vomiting. Vomited yesterday not today. Having abdominal cramping too. Genitourinary: Negative for difficulty urinating. Musculoskeletal: Positive for myalgias. Skin: Negative for rash. Neurological: Positive for light-headedness. Negative for numbness and headaches. Has her nl HA. Psychiatric/Behavioral: Negative for sleep disturbance. The patient is not nervous/anxious. Objective:      /70 (Site: Left Upper Arm, Position: Sitting, Cuff Size: Large Adult)   Pulse 91   Temp 97.7 °F (36.5 °C) (Temporal)   Resp 14   Ht 5' 4\" (1.626 m)   Wt 227 lb 3.2 oz (103.1 kg)   LMP  (Within Weeks) Comment: since october  SpO2 100%   Breastfeeding No   BMI 39.00 kg/m²     Physical Exam  Vitals signs and nursing note reviewed. Constitutional:       General: She is not in acute distress. Appearance: Normal appearance. She is well-developed. She is ill-appearing. HENT:      Head: Normocephalic and atraumatic. Right Ear: Ear canal and external ear normal.      Left Ear: Ear canal and external ear normal.      Ears:      Comments: TMs are dull. Nose: Congestion present. No rhinorrhea.       Mouth/Throat:      Mouth: Mucous membranes are moist. Pharynx: Posterior oropharyngeal erythema present. No oropharyngeal exudate. Comments: Postnasal drip noted. Eyes:      General: No scleral icterus. Conjunctiva/sclera: Conjunctivae normal.   Neck:      Musculoskeletal: Normal range of motion and neck supple. No neck rigidity or muscular tenderness. Cardiovascular:      Rate and Rhythm: Normal rate and regular rhythm. Heart sounds: Normal heart sounds. No murmur. No gallop. Pulmonary:      Effort: Pulmonary effort is normal. No respiratory distress. Breath sounds: No stridor. Wheezing present. No rhonchi or rales. Abdominal:      General: There is no distension. Palpations: Abdomen is soft. There is no mass. Tenderness: There is no abdominal tenderness. There is no guarding or rebound. Hernia: No hernia is present. Comments: Bowel sounds diminished across the abdomen. Musculoskeletal:         General: No swelling, tenderness, deformity or signs of injury. Right lower leg: No edema. Left lower leg: No edema. Lymphadenopathy:      Cervical: No cervical adenopathy. Skin:     General: Skin is warm and dry. Findings: No erythema or rash. Neurological:      General: No focal deficit present. Mental Status: She is alert and oriented to person, place, and time. Sensory: No sensory deficit.       Gait: Gait normal.   Psychiatric:         Mood and Affect: Mood normal.         Behavior: Behavior normal.       Xr Chest (2 Vw)    Result Date: 12/17/2020 EXAMINATION: TWO XRAY VIEWS OF THE CHEST 12/17/2020 7:00 pm COMPARISON: Chest radiograph 11/23/2020. HISTORY: ORDERING SYSTEM PROVIDED HISTORY: COVID-19 virus infection TECHNOLOGIST PROVIDED HISTORY: sob positive covid 11/23/2020  increasing sx cough wheezing Reason for Exam: COVID x 1 month Acuity: Chronic Type of Exam: Initial FINDINGS: The lungs are without acute focal process. There is no effusion or pneumothorax. The cardiomediastinal silhouette is without acute process. The osseous structures are without acute process. No acute process. Results for orders placed or performed in visit on 12/17/20   POCT rapid strep A   Result Value Ref Range    Strep A Ag None Detected None Detected       Assessment & Plan:     1. Non-intractable vomiting with nausea, unspecified vomiting type    - ondansetron (ZOFRAN ODT) 8 MG TBDP disintegrating tablet; Take 1 tablet by mouth every 8 hours as needed for Nausea  Dispense: 10 tablet; Refill: 1  - methylPREDNISolone (MEDROL, BEVERLY,) 4 MG tablet; Take by mouth. Dispense: 1 kit; Refill: 0  - levoFLOXacin (LEVAQUIN) 500 MG tablet; Take 1 tablet by mouth daily for 10 days  Dispense: 10 tablet; Refill: 0    2. COVID-19 virus infection    - XR CHEST STANDARD (2 VW); Future  - methylPREDNISolone (MEDROL, BEVERLY,) 4 MG tablet; Take by mouth. Dispense: 1 kit; Refill: 0  - levoFLOXacin (LEVAQUIN) 500 MG tablet; Take 1 tablet by mouth daily for 10 days  Dispense: 10 tablet; Refill: 0    3. Cough    - XR CHEST STANDARD (2 VW); Future  - methylPREDNISolone (MEDROL, BEVERLY,) 4 MG tablet; Take by mouth. Dispense: 1 kit; Refill: 0  - levoFLOXacin (LEVAQUIN) 500 MG tablet; Take 1 tablet by mouth daily for 10 days  Dispense: 10 tablet; Refill: 0    4. SOB (shortness of breath)    - XR CHEST STANDARD (2 VW); Future  - methylPREDNISolone (MEDROL, BEVERLY,) 4 MG tablet; Take by mouth. Dispense: 1 kit;  Refill: 0 - levoFLOXacin (LEVAQUIN) 500 MG tablet; Take 1 tablet by mouth daily for 10 days  Dispense: 10 tablet; Refill: 0    5. Pharyngitis, unspecified etiology    - POCT rapid strep A  - XR CHEST STANDARD (2 VW); Future  - methylPREDNISolone (MEDROL, BEVERLY,) 4 MG tablet; Take by mouth. Dispense: 1 kit; Refill: 0  - levoFLOXacin (LEVAQUIN) 500 MG tablet; Take 1 tablet by mouth daily for 10 days  Dispense: 10 tablet;  Refill: 0    Fluids rest  Answered her questions  Notified of chest x-ray findings  Follow-up not improving or worsens  Quarantine as recommended previously  [employer]  Over counter medications as needed for diarrhea      Camila Dickerson, [id number]  12/29/2020 6:33 PM EST    (Pleasenote that portions of this note were completed with a voice recognition program.Efforts were made to edit the dictations but occasionally words are mis-transcribed.)

## 2020-12-22 RX ORDER — FLUTICASONE PROPIONATE 50 MCG
SPRAY, SUSPENSION (ML) NASAL
Qty: 16 G | Refills: 3 | Status: SHIPPED | OUTPATIENT
Start: 2020-12-22

## 2020-12-22 NOTE — TELEPHONE ENCOUNTER
Elliot Mckenna called requesting a refill of the below medication which has been pended for you:     Requested Prescriptions     Pending Prescriptions Disp Refills    fluticasone (FLONASE) 50 MCG/ACT nasal spray 16 g 3     Sig: SHAKE LIQUID AND USE 2 SPRAYS IN EACH NOSTRIL DAILY       Last Appointment Date: 7/29/2020  Next Appointment Date: Visit date not found    Allergies   Allergen Reactions    Norco [Hydrocodone-Acetaminophen] Other (See Comments)     Patient stated it gave her severe headaches

## 2020-12-31 ENCOUNTER — HOSPITAL ENCOUNTER (OUTPATIENT)
Dept: NEUROLOGY | Age: 35
Discharge: HOME | End: 2020-12-31
Payer: MEDICARE

## 2020-12-31 DIAGNOSIS — R20.0 LEFT ARM NUMBNESS: ICD-10-CM

## 2020-12-31 PROCEDURE — 95886 MUSC TEST DONE W/N TEST COMP: CPT

## 2020-12-31 PROCEDURE — 95912 NRV CNDJ TEST 11-12 STUDIES: CPT

## 2020-12-31 NOTE — PROGRESS NOTES
EMG/NCS Bilateral    upper Completed    PCP: Roderick Wright MD    Ordering: Avtar Nash    Interpreting Physician:Lan Gonzalez MD    Technician: Raul Power RN
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
## 2021-01-12 RX ORDER — BUPRENORPHINE 20 UG/H
1 PATCH TRANSDERMAL WEEKLY
Qty: 4 PATCH | Refills: 1 | Status: SHIPPED | OUTPATIENT
Start: 2021-01-12 | End: 2021-01-25 | Stop reason: CLARIF

## 2021-01-15 ENCOUNTER — TELEPHONE (OUTPATIENT)
Dept: PAIN MANAGEMENT | Age: 36
End: 2021-01-15

## 2021-01-19 ENCOUNTER — HOSPITAL ENCOUNTER (OUTPATIENT)
Dept: GENERAL RADIOLOGY | Age: 36
Discharge: HOME | End: 2021-01-21
Payer: MEDICARE

## 2021-01-19 ENCOUNTER — HOSPITAL ENCOUNTER (OUTPATIENT)
Age: 36
Setting detail: SPECIMEN
Discharge: HOME | End: 2021-01-19
Payer: MEDICARE

## 2021-01-19 ENCOUNTER — HOSPITAL ENCOUNTER (OUTPATIENT)
Age: 36
Discharge: HOME | End: 2021-01-21
Payer: MEDICARE

## 2021-01-19 ENCOUNTER — OFFICE VISIT (OUTPATIENT)
Dept: PRIMARY CARE CLINIC | Age: 36
End: 2021-01-19
Payer: MEDICARE

## 2021-01-19 VITALS
SYSTOLIC BLOOD PRESSURE: 126 MMHG | WEIGHT: 229.4 LBS | HEIGHT: 64 IN | TEMPERATURE: 98.1 F | RESPIRATION RATE: 18 BRPM | HEART RATE: 88 BPM | DIASTOLIC BLOOD PRESSURE: 82 MMHG | OXYGEN SATURATION: 98 % | BODY MASS INDEX: 39.16 KG/M2

## 2021-01-19 DIAGNOSIS — R05.9 COUGH: ICD-10-CM

## 2021-01-19 DIAGNOSIS — Z86.16 HISTORY OF COVID-19: ICD-10-CM

## 2021-01-19 DIAGNOSIS — J01.40 ACUTE NON-RECURRENT PANSINUSITIS: Primary | ICD-10-CM

## 2021-01-19 DIAGNOSIS — R06.02 SHORTNESS OF BREATH: ICD-10-CM

## 2021-01-19 DIAGNOSIS — H66.002 NON-RECURRENT ACUTE SUPPURATIVE OTITIS MEDIA OF LEFT EAR WITHOUT SPONTANEOUS RUPTURE OF TYMPANIC MEMBRANE: ICD-10-CM

## 2021-01-19 DIAGNOSIS — F41.9 ANXIETY: ICD-10-CM

## 2021-01-19 DIAGNOSIS — R50.9 FEVER, UNSPECIFIED FEVER CAUSE: ICD-10-CM

## 2021-01-19 DIAGNOSIS — J45.41 MODERATE PERSISTENT ASTHMA WITH EXACERBATION: ICD-10-CM

## 2021-01-19 DIAGNOSIS — R06.2 WHEEZES: ICD-10-CM

## 2021-01-19 LAB
ABSOLUTE EOS #: 0.33 K/UL (ref 0–0.44)
ABSOLUTE IMMATURE GRANULOCYTE: 0.08 K/UL (ref 0–0.3)
ABSOLUTE LYMPH #: 2.93 K/UL (ref 1.1–3.7)
ABSOLUTE MONO #: 0.9 K/UL (ref 0.1–1.2)
ALBUMIN SERPL-MCNC: 4.4 G/DL (ref 3.5–5.2)
ALBUMIN/GLOBULIN RATIO: 1.5 (ref 1–2.5)
ALP BLD-CCNC: 58 U/L (ref 35–104)
ALT SERPL-CCNC: 9 U/L (ref 5–33)
ANION GAP SERPL CALCULATED.3IONS-SCNC: 9 MMOL/L (ref 9–17)
AST SERPL-CCNC: 11 U/L
BASOPHILS # BLD: 1 % (ref 0–2)
BASOPHILS ABSOLUTE: 0.07 K/UL (ref 0–0.2)
BILIRUB SERPL-MCNC: 0.22 MG/DL (ref 0.3–1.2)
BUN BLDV-MCNC: 10 MG/DL (ref 6–20)
BUN/CREAT BLD: 14 (ref 9–20)
CALCIUM SERPL-MCNC: 9.4 MG/DL (ref 8.6–10.4)
CHLORIDE BLD-SCNC: 105 MMOL/L (ref 98–107)
CO2: 26 MMOL/L (ref 20–31)
CREAT SERPL-MCNC: 0.71 MG/DL (ref 0.5–0.9)
D-DIMER QUANTITATIVE: 0.47 MG/L FEU (ref 0–0.59)
DIFFERENTIAL TYPE: ABNORMAL
EOSINOPHILS RELATIVE PERCENT: 2 % (ref 1–4)
GFR AFRICAN AMERICAN: >60 ML/MIN
GFR NON-AFRICAN AMERICAN: >60 ML/MIN
GFR SERPL CREATININE-BSD FRML MDRD: ABNORMAL ML/MIN/{1.73_M2}
GFR SERPL CREATININE-BSD FRML MDRD: ABNORMAL ML/MIN/{1.73_M2}
GLUCOSE BLD-MCNC: 97 MG/DL (ref 70–99)
HCT VFR BLD CALC: 47.8 % (ref 36.3–47.1)
HEMOGLOBIN: 15.8 G/DL (ref 11.9–15.1)
IMMATURE GRANULOCYTES: 1 %
INFLUENZA A ANTIBODY: NORMAL
INFLUENZA B ANTIBODY: NORMAL
LYMPHOCYTES # BLD: 21 % (ref 24–43)
MCH RBC QN AUTO: 30.1 PG (ref 25.2–33.5)
MCHC RBC AUTO-ENTMCNC: 33.1 G/DL (ref 25.2–33.5)
MCV RBC AUTO: 91 FL (ref 82.6–102.9)
MONOCYTES # BLD: 6 % (ref 3–12)
NRBC AUTOMATED: 0 PER 100 WBC
PDW BLD-RTO: 14.4 % (ref 11.8–14.4)
PLATELET # BLD: 475 K/UL (ref 138–453)
PLATELET ESTIMATE: ABNORMAL
PMV BLD AUTO: 9.7 FL (ref 8.1–13.5)
POTASSIUM SERPL-SCNC: 4.6 MMOL/L (ref 3.7–5.3)
RBC # BLD: 5.25 M/UL (ref 3.95–5.11)
RBC # BLD: ABNORMAL 10*6/UL
SEG NEUTROPHILS: 69 % (ref 36–65)
SEGMENTED NEUTROPHILS ABSOLUTE COUNT: 9.8 K/UL (ref 1.5–8.1)
SODIUM BLD-SCNC: 140 MMOL/L (ref 135–144)
TOTAL PROTEIN: 7.3 G/DL (ref 6.4–8.3)
WBC # BLD: 14.1 K/UL (ref 3.5–11.3)
WBC # BLD: ABNORMAL 10*3/UL

## 2021-01-19 PROCEDURE — 36415 COLL VENOUS BLD VENIPUNCTURE: CPT

## 2021-01-19 PROCEDURE — 85025 COMPLETE CBC W/AUTO DIFF WBC: CPT

## 2021-01-19 PROCEDURE — G8427 DOCREV CUR MEDS BY ELIG CLIN: HCPCS | Performed by: NURSE PRACTITIONER

## 2021-01-19 PROCEDURE — 99214 OFFICE O/P EST MOD 30 MIN: CPT | Performed by: NURSE PRACTITIONER

## 2021-01-19 PROCEDURE — 4004F PT TOBACCO SCREEN RCVD TLK: CPT | Performed by: NURSE PRACTITIONER

## 2021-01-19 PROCEDURE — 71046 X-RAY EXAM CHEST 2 VIEWS: CPT

## 2021-01-19 PROCEDURE — G8484 FLU IMMUNIZE NO ADMIN: HCPCS | Performed by: NURSE PRACTITIONER

## 2021-01-19 PROCEDURE — G8417 CALC BMI ABV UP PARAM F/U: HCPCS | Performed by: NURSE PRACTITIONER

## 2021-01-19 PROCEDURE — 87804 INFLUENZA ASSAY W/OPTIC: CPT | Performed by: NURSE PRACTITIONER

## 2021-01-19 PROCEDURE — 85379 FIBRIN DEGRADATION QUANT: CPT

## 2021-01-19 PROCEDURE — 80053 COMPREHEN METABOLIC PANEL: CPT

## 2021-01-19 RX ORDER — PREDNISONE 20 MG/1
20 TABLET ORAL 2 TIMES DAILY
Qty: 10 TABLET | Refills: 0 | Status: SHIPPED | OUTPATIENT
Start: 2021-01-19 | End: 2021-01-24

## 2021-01-19 RX ORDER — AMOXICILLIN AND CLAVULANATE POTASSIUM 875; 125 MG/1; MG/1
1 TABLET, FILM COATED ORAL 2 TIMES DAILY
Qty: 20 TABLET | Refills: 0 | Status: SHIPPED | OUTPATIENT
Start: 2021-01-19 | End: 2021-01-29

## 2021-01-19 ASSESSMENT — ENCOUNTER SYMPTOMS
VOMITING: 1
COUGH: 1
SHORTNESS OF BREATH: 1
NAUSEA: 0
SINUS PRESSURE: 1
SORE THROAT: 1
RHINORRHEA: 1
DIARRHEA: 1
ABDOMINAL PAIN: 0
SINUS COMPLAINT: 1
WHEEZING: 1

## 2021-01-19 ASSESSMENT — PATIENT HEALTH QUESTIONNAIRE - PHQ9
SUM OF ALL RESPONSES TO PHQ QUESTIONS 1-9: 2
SUM OF ALL RESPONSES TO PHQ QUESTIONS 1-9: 2
1. LITTLE INTEREST OR PLEASURE IN DOING THINGS: 1

## 2021-01-19 NOTE — PROGRESS NOTES
[facility]             [address], [address]                        Telephone [phone]             Fax [phone]     Ann-Marie Nieves  1985  IDR:[id number]   Date of visit:  1/19/2021    Subjective: Ann-Marie Nieves is a 39 y.o.  female who presents to [facility] today (1/19/2021) for evaluation of:    Chief Complaint   Patient presents with    Congestion     fever,diarrhea,headache,fatigue,stuffy nose,dry cough,green mucus from nares,chills,sweats,tested positive in November       Sinus Problem  This is a new problem. The current episode started 1 to 4 weeks ago (Returned to work on 12/28/20, developed a fever on 01/09/21 and was sent home; sinus symptoms began on 01/11/21 and was unable to get a ride to doctor until today. ). The problem has been gradually worsening since onset. The maximum temperature recorded prior to her arrival was 101 - 101.9 F. Her pain is at a severity of 6/10. Associated symptoms include chills, congestion (green mucus), coughing, ear pain (left ear constant; began yesterday), headaches, shortness of breath, sinus pressure and a sore throat. (Patient is very tearful and anxious. Fatigue; diarrhea watery in morning; Postnasal drainage is thick, green and an excessive amount per patient.) Past treatments include acetaminophen (dayquil). The treatment provided mild relief.    Shortness of Breath This is a new problem. The current episode started 1 to 4 weeks ago (Patient verbalized that shortness of breath and cough improved and were waxing and waning since Levaquin treatment on 12/17/20; shortness of breath and cough increased 01/09/21). The problem occurs intermittently. The problem has been waxing and waning. Associated symptoms include ear pain (left ear constant; began yesterday), a fever, headaches, a rash (bilateral eyelids), rhinorrhea, a sore throat, vomiting (every morning with coughing; able to keep food and water down) and wheezing. Pertinent negatives include no abdominal pain, chest pain or leg swelling. The symptoms are aggravated by lying flat. Associated symptoms comments: Increased anxiety. The patient has no known risk factors for DVT/PE. She has tried beta agonist inhalers (sitting upright; albuterol inhaler 3-4 times daily) for the symptoms. The treatment provided mild relief. Her past medical history is significant for asthma. (Anxiety and depression)   Patient has hydroxyzine at home PRN for anxiety and has not taken it. She has the following problem list:  Patient Active Problem List   Diagnosis    Tobacco abuse    Depression    Dysmenorrhea    Vitamin D deficiency    Environmental allergies    Moderate asthma    Cervical radiculopathy    Cervicalgia    Myofascial pain    Neuroforaminal stenosis of cervical spine        Current medications are:  Current Outpatient Medications   Medication Sig Dispense Refill    amoxicillin-clavulanate (AUGMENTIN) 875-125 MG per tablet Take 1 tablet by mouth 2 times daily for 10 days 20 tablet 0    predniSONE (DELTASONE) 20 MG tablet Take 1 tablet by mouth 2 times daily for 5 days 10 tablet 0    Spacer/Aero-Holding Chambers BRAYAN 1 Device by Does not apply route daily as needed (as needed with inhaler) 1 Device 0    buprenorphine (BUTRANS) 20 MCG/HR PTWK Place 1 patch onto the skin once a week for 30 days.  4 patch 1  fluticasone (FLONASE) 50 MCG/ACT nasal spray SHAKE LIQUID AND USE 2 SPRAYS IN EACH NOSTRIL DAILY 16 g 3    ondansetron (ZOFRAN ODT) 8 MG TBDP disintegrating tablet Take 1 tablet by mouth every 8 hours as needed for Nausea 10 tablet 1    methylPREDNISolone (MEDROL, BEVERLY,) 4 MG tablet Take by mouth. 1 kit 0    valACYclovir (VALTREX) 1 g tablet Take 1,000 mg by mouth 2 times daily      lamoTRIgine (LAMICTAL) 25 MG tablet Take 2 tablets by mouth daily      albuterol (PROVENTIL) (2.5 MG/3ML) 0.083% nebulizer solution Take 3 mLs by nebulization every 4 hours as needed for Wheezing 120 each 0    albuterol sulfate HFA (VENTOLIN HFA) 108 (90 Base) MCG/ACT inhaler Inhale 2 puffs into the lungs every 4 hours as needed for Wheezing ventolin 1 Inhaler 5    diclofenac sodium (VOLTAREN) 1 % GEL Apply 2 g topically 4 times daily 5 Tube 5    celecoxib (CELEBREX) 200 MG capsule Take 1 capsule by mouth 2 times daily 60 capsule 3    mirtazapine (REMERON) 7.5 MG tablet Take 7.5 mg by mouth nightly      prazosin (MINIPRESS) 1 MG capsule Take 1 mg by mouth nightly      ziprasidone (GEODON) 20 MG capsule Take 20 mg by mouth 2 times daily (with meals)      omeprazole (PRILOSEC OTC) 20 MG tablet Take 1 tablet by mouth daily 30 tablet 1    fexofenadine (ALLEGRA ALLERGY) 180 MG tablet Take 1 tablet by mouth daily 30 tablet 3    fluticasone (FLOVENT HFA) 44 MCG/ACT inhaler Inhale 2 puffs into the lungs 2 times daily 1 Inhaler 3    albuterol sulfate HFA (VENTOLIN HFA) 108 (90 Base) MCG/ACT inhaler Inhale 2 puffs into the lungs every 4 hours as needed for Wheezing or Shortness of Breath 1 Inhaler 3    Multiple Vitamins-Minerals (MULTIVITAMIN WOMEN PO) Take 1 tablet by mouth daily      hydrOXYzine (VISTARIL) 25 MG capsule Take 25 mg by mouth 3 times daily as needed for Anxiety      pregabalin (LYRICA) 200 MG capsule Take 1 capsule by mouth 3 times daily for 30 days.  90 capsule 2 No current facility-administered medications for this visit. She is allergic to norco [hydrocodone-acetaminophen]. .    She  reports that she has been smoking e-cigarettes and cigarettes. She started smoking about 17 years ago. She has a 5.00 pack-year smoking history. She has never used smokeless tobacco.      Objective:    Vitals:    01/19/21 1820   BP: 126/82   Pulse: 88   Resp: 18   Temp: 98.1 °F (36.7 °C)   TempSrc: Temporal   SpO2: 98%   Weight: 229 lb 6.4 oz (104.1 kg)   Height: 5' 4\" (1.626 m)     Body mass index is 39.38 kg/m². Review of Systems   Constitutional: Positive for appetite change, chills, fatigue and fever. HENT: Positive for congestion (green mucus), ear pain (left ear constant; began yesterday), postnasal drip, rhinorrhea, sinus pressure and sore throat. Respiratory: Positive for cough, shortness of breath and wheezing. Cardiovascular: Negative for chest pain and leg swelling. Gastrointestinal: Positive for diarrhea and vomiting (every morning with coughing; able to keep food and water down). Negative for abdominal pain and nausea. Skin: Positive for rash (bilateral eyelids). Neurological: Positive for headaches. Physical Exam  Vitals signs and nursing note reviewed. Constitutional:       Appearance: She is well-developed. HENT:      Head: Normocephalic. Jaw: There is normal jaw occlusion. Right Ear: Tympanic membrane, ear canal and external ear normal.      Left Ear: Ear canal and external ear normal. Tympanic membrane is injected, erythematous and bulging. Nose: Rhinorrhea present. Rhinorrhea is purulent. Right Turbinates: Swollen. Left Turbinates: Swollen. Right Sinus: Maxillary sinus tenderness and frontal sinus tenderness present. Left Sinus: Maxillary sinus tenderness and frontal sinus tenderness present. Mouth/Throat:      Lips: Pink.       Mouth: Mucous membranes are moist. Pharynx: Uvula midline. Posterior oropharyngeal erythema present. Tonsils: 0 on the right. 0 on the left. Eyes:      Pupils: Pupils are equal, round, and reactive to light. Neck:      Musculoskeletal: Normal range of motion and neck supple. Cardiovascular:      Rate and Rhythm: Normal rate and regular rhythm. Heart sounds: Normal heart sounds. Pulmonary:      Effort: Pulmonary effort is normal.      Breath sounds: Normal air entry. Wheezing (expiratory bilateral throughout) present. Abdominal:      General: Abdomen is protuberant. Bowel sounds are normal.      Palpations: Abdomen is soft. Tenderness: There is no abdominal tenderness. Lymphadenopathy:      Cervical: No cervical adenopathy. Skin:     General: Skin is warm and dry. Neurological:      General: No focal deficit present. Mental Status: She is alert and oriented to person, place, and time. Psychiatric:         Mood and Affect: Mood is anxious and depressed. Speech: Speech normal.         Behavior: Behavior normal.         Thought Content: Thought content normal.       Assessment and Plan:    Xr Chest (2 Vw)    Result Date: 1/19/2021  EXAMINATION: TWO XRAY VIEWS OF THE CHEST 1/19/2021 6:36 pm COMPARISON: December 17, 2020 HISTORY: ORDERING SYSTEM PROVIDED HISTORY: Cough TECHNOLOGIST PROVIDED HISTORY: cough worse X 10 days with low grade fever Reason for Exam: Cough worse the last 10 days with low grade fever, was COVID + back in NOV, hx of asthma, smoker Acuity: Acute Type of Exam: Initial FINDINGS: Adequate inspiration is present. No focal area of consolidation or pneumothorax is present.   Heart size, mediastinal contours and osseous structures appear normal.     Stable chest radiograph, without evidence of acute cardiopulmonary disease     Hospital Outpatient Visit on 01/19/2021   Component Date Value Ref Range Status    D-Dimer, Quant 01/19/2021 0.47  0.00 - 0.59 mg/L FEU Final    Comment: When combined with a low clinical probability, a D dimer value of <0.50 mg/L FEU is   considered negative for DVT and PE (negative predictive value of 98%, sensitivity of 97%). If this test is not being used to help rule out DVT and PE, then the following reference   range should be utilized: 0.00 - 0.59 mg/L FEU. The D-Dimer assay is intended for use as an aid in the diagnosis of venous thromboembolism   (DVT and PE) and the results should be interpreted in conjunction with the patient's medical   history, clinical presentation, and other findings. Elevated levels of D-dimer activity can be seen in any state of coagulation activation and   is not recommended in patients with therapeutic dose anticoagulant therapy for >24 hours,   fibrinolytic therapy within the previous 7 days, trauma or surgery within the previous 4   weeks,   disseminated malignancies, aortic aneurysm, sepsis, severe infections, pneumonia, severe   skin infections, liver cirrhosis, advanced age, waldemar                           nary disease, diabetes, and pregnancy. A very low percentage of patients with DVT may yield D-dimer results below the cutoff of   0.5 mg/L FEU. This is known to be more prevalent in patients with distal DVT.             Glucose 01/19/2021 97  70 - 99 mg/dL Final    BUN 01/19/2021 10  6 - 20 mg/dL Final    CREATININE 01/19/2021 0.71  0.50 - 0.90 mg/dL Final    Bun/Cre Ratio 01/19/2021 14  9 - 20 Final    Calcium 01/19/2021 9.4  8.6 - 10.4 mg/dL Final    Sodium 01/19/2021 140  135 - 144 mmol/L Final    Potassium 01/19/2021 4.6  3.7 - 5.3 mmol/L Final    Chloride 01/19/2021 105  98 - 107 mmol/L Final    CO2 01/19/2021 26  20 - 31 mmol/L Final    Anion Gap 01/19/2021 9  9 - 17 mmol/L Final    Alkaline Phosphatase 01/19/2021 58  35 - 104 U/L Final    ALT 01/19/2021 9  5 - 33 U/L Final    AST 01/19/2021 11  <32 U/L Final    Total Bilirubin 01/19/2021 0.22* 0.3 - 1.2 mg/dL Final Complete full course of antibiotic. Take prednisone as directed. Take over the counter probiotic while taking antibiotic. Use albuterol inhaler with spacer as directed. Start taking the hydroxyzine as directed for anxiety that she has at home. I recommended that she use mucinex DM to help with congestion and cough. I also recommended Flonase and an antihistamine for sinus symptoms. she was also encouraged to use tylenol for pain/fever. Increase water intake. Use cool mist humidifier at bedtime. Use nasal saline flush as needed. Good hand hygiene. she was instructed to return if there is no improvement or symptoms worsen. I reviewed labs and x-ray and discussed case with Dr. Karan Adkins. The use, risks, benefits, and side effects of prescribed or recommended medications were discussed. All questions were answered and the patient/caregiver voiced understanding. No orders of the defined types were placed in this encounter.         Electronically signed by RAMON Mcintyre CNP on 1/19/21 at 6:30 PM EST

## 2021-01-19 NOTE — LETTER
[address] [facility]  [address] [facility] [address]  Phone: [phone]  Fax: Parminder Dang 72., APRN - CNP        January 19, 2021     Patient: Helen Hamman   YOB: 1985   Date of Visit: 1/19/2021       To Whom it May Concern:    Helen Hamman was seen in my clinic on 1/19/2021. She may return to work on 01/22/21. If you have any questions or concerns, please don't hesitate to call.     Sincerely,         RAMON Duran - CNP

## 2021-01-19 NOTE — LETTER
[address] [facility]  [address] [id number]  Phone: [phone]  Fax: Parminder [facility] 72., APRN - CNP        January 19, 2021     Patient: Ivanna Mcwilliams   YOB: 1985   Date of Visit: 1/19/2021       To Whom it May Concern:    Ivanna Mcwilliams was seen in my clinic on 1/19/2021. She may return to work on 01/21/21. If you have any questions or concerns, please don't hesitate to call.     Sincerely,         Veto Conley, RAMON - CNP

## 2021-01-20 DIAGNOSIS — M79.18 MYOFASCIAL PAIN: ICD-10-CM

## 2021-01-20 DIAGNOSIS — M54.12 CERVICAL RADICULOPATHY: ICD-10-CM

## 2021-01-20 DIAGNOSIS — M48.02 NEUROFORAMINAL STENOSIS OF CERVICAL SPINE: ICD-10-CM

## 2021-01-20 DIAGNOSIS — M54.2 CERVICALGIA: ICD-10-CM

## 2021-01-20 RX ORDER — PREGABALIN 200 MG/1
200 CAPSULE ORAL 2 TIMES DAILY
Qty: 60 CAPSULE | Refills: 2 | Status: SHIPPED | OUTPATIENT
Start: 2021-01-20 | End: 2021-01-21

## 2021-01-20 RX ORDER — CELECOXIB 200 MG/1
200 CAPSULE ORAL 2 TIMES DAILY
Qty: 60 CAPSULE | Refills: 3 | Status: SHIPPED | OUTPATIENT
Start: 2021-01-20 | End: 2021-08-03 | Stop reason: SDUPTHER

## 2021-01-20 NOTE — PATIENT INSTRUCTIONS
Patient Education        Cough: Care Instructions  Your Care Instructions     A cough is your body's response to something that bothers your throat or airways. Many things can cause a cough. You might cough because of a cold or the flu, bronchitis, or asthma. Smoking, postnasal drip, allergies, and stomach acid that backs up into your throat also can cause coughs. A cough is a symptom, not a disease. Most coughs stop when the cause, such as a cold, goes away. You can take a few steps at home to cough less and feel better. Follow-up care is a key part of your treatment and safety. Be sure to make and go to all appointments, and call your doctor if you are having problems. It's also a good idea to know your test results and keep a list of the medicines you take. How can you care for yourself at home? · Drink lots of water and other fluids. This helps thin the mucus and soothes a dry or sore throat. Honey or lemon juice in hot water or tea may ease a dry cough. · Take cough medicine as directed by your doctor. · Prop up your head on pillows to help you breathe and ease a dry cough. · Try cough drops to soothe a dry or sore throat. Cough drops don't stop a cough. Medicine-flavored cough drops are no better than candy-flavored drops or hard candy. · Do not smoke. Avoid secondhand smoke. If you need help quitting, talk to your doctor about stop-smoking programs and medicines. These can increase your chances of quitting for good. When should you call for help? Call 911 anytime you think you may need emergency care. For example, call if:    · You have severe trouble breathing. Call your doctor now or seek immediate medical care if:    · You cough up blood.     · You have new or worse trouble breathing.     · You have a new or higher fever.     · You have a new rash.    Watch closely for changes in your health, and be sure to contact your doctor if:   · You cough more deeply or more often, especially if you notice more mucus or a change in the color of your mucus.     · You have new symptoms, such as a sore throat, an earache, or sinus pain.     · You do not get better as expected. Where can you learn more? Go to https://chpepiceweb.[email or web address]. org and sign in to your Shnerglet account. Enter D279 in the Mobile Embrace box to learn more about \"Cough: Care Instructions. \"     If you do not have an account, please click on the \"Sign Up Now\" link. Current as of: February 24, 2020               Content Version: 12.6  © 2006-2020 [employer]. Care instructions adapted under license by [facility] ([facility]). If you have questions about a medical condition or this instruction, always ask your healthcare professional. [address] any warranty or liability for your use of this information. Patient Education        Sinusitis: Care Instructions  Your Care Instructions     Sinusitis is an infection of the lining of the sinus cavities in your head. Sinusitis often follows a cold. It causes pain and pressure in your head and face. In most cases, sinusitis gets better on its own in 1 to 2 weeks. But some mild symptoms may last for several weeks. Sometimes antibiotics are needed. Follow-up care is a key part of your treatment and safety. Be sure to make and go to all appointments, and call your doctor if you are having problems. It's also a good idea to know your test results and keep a list of the medicines you take. How can you care for yourself at home? · Take an over-the-counter pain medicine, such as acetaminophen (Tylenol), ibuprofen (Advil, Motrin), or naproxen (Aleve). Read and follow all instructions on the label. · If the doctor prescribed antibiotics, take them as directed. Do not stop taking them just because you feel better. You need to take the full course of antibiotics. · Be careful when taking over-the-counter cold or flu medicines and Tylenol at the same time. Many of these medicines have acetaminophen, which is Tylenol. Read the labels to make sure that you are not taking more than the recommended dose. Too much acetaminophen (Tylenol) can be harmful. · Breathe warm, moist air from a steamy shower, a hot bath, or a sink filled with hot water. Avoid cold, dry air. Using a humidifier in your home may help. Follow the directions for cleaning the machine. · Use saline (saltwater) nasal washes to help keep your nasal passages open and wash out mucus and bacteria. You can buy saline nose drops at a grocery store or drugstore. Or you can make your own at home by adding 1 teaspoon of salt and 1 teaspoon of baking soda to 2 cups of distilled water. If you make your own, fill a bulb syringe with the solution, insert the tip into your nostril, and squeeze gently. [place] [address] your nose. · Put a hot, wet towel or a warm gel pack on your face 3 or 4 times a day for 5 to 10 minutes each time. · Try a decongestant nasal spray like oxymetazoline (Afrin). Do not use it for more than 3 days in a row. Using it for more than 3 days can make your congestion worse. When should you call for help? Call your doctor now or seek immediate medical care if:    · You have new or worse swelling or redness in your face or around your eyes.     · You have a new or higher fever. Watch closely for changes in your health, and be sure to contact your doctor if:    · You have new or worse facial pain.     · The mucus from your nose becomes thicker (like pus) or has new blood in it.     · You are not getting better as expected. Where can you learn more? Go to https://SpikeSource.Kavam.com. org and sign in to your Urge account. Enter O854 in the Micropharma box to learn more about \"Sinusitis: Care Instructions. \"     If you do not have an account, please click on the \"Sign Up Now\" link. Current as of: April 15, 2020               Content Version: 12.6  © 5072-6211 [employer]. Care instructions adapted under license by [facility] ([facility]). If you have questions about a medical condition or this instruction, always ask your healthcare professional. [address] any warranty or liability for your use of this information.

## 2021-01-20 NOTE — TELEPHONE ENCOUNTER
Pt called refill line for her Lyrica, Celebrex and Diclofenac cream sent to [facility] in [place]. Pt stated that she is going to urgent care for covid like symptoms and cannot come in for appt.      Last Appt:  11/3/2020  Next Appt:   Visit date not found  Med verified in [facility] [address]

## 2021-01-21 RX ORDER — PREGABALIN 200 MG/1
200 CAPSULE ORAL 3 TIMES DAILY
Qty: 90 CAPSULE | Refills: 5 | OUTPATIENT
Start: 2021-01-21 | End: 2021-08-03 | Stop reason: SDUPTHER

## 2021-01-21 NOTE — TELEPHONE ENCOUNTER
Pt lyrica was sent in but it was the wrong sig, the pt takes TID not BID. Pt already picked up the Rx and isn't sure what to do now. Please advise.

## 2021-01-25 ENCOUNTER — OFFICE VISIT (OUTPATIENT)
Dept: PAIN MANAGEMENT | Age: 36
End: 2021-01-25
Payer: MEDICARE

## 2021-01-25 VITALS
WEIGHT: 238 LBS | BODY MASS INDEX: 40.63 KG/M2 | DIASTOLIC BLOOD PRESSURE: 84 MMHG | SYSTOLIC BLOOD PRESSURE: 134 MMHG | HEIGHT: 64 IN

## 2021-01-25 DIAGNOSIS — M79.18 MYOFASCIAL PAIN ON LEFT SIDE: ICD-10-CM

## 2021-01-25 DIAGNOSIS — M54.12 CERVICAL RADICULOPATHY: Primary | ICD-10-CM

## 2021-01-25 PROCEDURE — G8484 FLU IMMUNIZE NO ADMIN: HCPCS | Performed by: NURSE PRACTITIONER

## 2021-01-25 PROCEDURE — G8417 CALC BMI ABV UP PARAM F/U: HCPCS | Performed by: NURSE PRACTITIONER

## 2021-01-25 PROCEDURE — G8427 DOCREV CUR MEDS BY ELIG CLIN: HCPCS | Performed by: NURSE PRACTITIONER

## 2021-01-25 PROCEDURE — 4004F PT TOBACCO SCREEN RCVD TLK: CPT | Performed by: NURSE PRACTITIONER

## 2021-01-25 PROCEDURE — 99214 OFFICE O/P EST MOD 30 MIN: CPT | Performed by: NURSE PRACTITIONER

## 2021-01-25 PROCEDURE — 99215 OFFICE O/P EST HI 40 MIN: CPT

## 2021-01-25 ASSESSMENT — ENCOUNTER SYMPTOMS
RESPIRATORY NEGATIVE: 1
GASTROINTESTINAL NEGATIVE: 1

## 2021-01-25 NOTE — PROGRESS NOTES
Subjective:      Patient ID: Ann-Marie Nieves is a 39 y.o. female. Chief Complaint   Patient presents with    Neck Pain     Neck Pain   Associated symptoms include numbness. Shoulder Pain   Associated symptoms include numbness. Underwent EMG, flared her pain. Left sided pain. She started wearing wrist splints, no relief. Pain Assessment  Location of Pain: Neck  Location Modifiers: Left  Severity of Pain: 8  Quality of Pain: Throbbing, Sharp, Dull, Aching  Duration of Pain: Persistent  Frequency of Pain: Constant  Aggravating Factors: Bending(reaching, lifting arms)  Limiting Behavior: Yes  Relieving Factors: Rest, Ice, Heat(pressure (from bra))  Are there other pain locations you wish to document?: No    Allergies   Allergen Reactions    Norco [Hydrocodone-Acetaminophen] Other (See Comments)     Patient stated it gave her severe headaches       Outpatient Medications Marked as Taking for the 1/25/21 encounter (Office Visit) with Mabelene Castleman, APRN - CNP   Medication Sig Dispense Refill    pregabalin (LYRICA) 200 MG capsule Take 1 capsule by mouth 3 times daily for 30 days.  90 capsule 5    celecoxib (CELEBREX) 200 MG capsule Take 1 capsule by mouth 2 times daily 60 capsule 3    amoxicillin-clavulanate (AUGMENTIN) 875-125 MG per tablet Take 1 tablet by mouth 2 times daily for 10 days 20 tablet 0    Spacer/Aero-Holding Chambers BRAYAN 1 Device by Does not apply route daily as needed (as needed with inhaler) 1 Device 0    fluticasone (FLONASE) 50 MCG/ACT nasal spray SHAKE LIQUID AND USE 2 SPRAYS IN EACH NOSTRIL DAILY 16 g 3    ondansetron (ZOFRAN ODT) 8 MG TBDP disintegrating tablet Take 1 tablet by mouth every 8 hours as needed for Nausea 10 tablet 1    valACYclovir (VALTREX) 1 g tablet Take 1,000 mg by mouth 2 times daily      lamoTRIgine (LAMICTAL) 25 MG tablet Take 2 tablets by mouth daily      albuterol (PROVENTIL) (2.5 MG/3ML) 0.083% nebulizer solution Take 3 mLs by nebulization every 4 hours as needed for Wheezing 120 each 0    albuterol sulfate HFA (VENTOLIN HFA) 108 (90 Base) MCG/ACT inhaler Inhale 2 puffs into the lungs every 4 hours as needed for Wheezing ventolin 1 Inhaler 5    diclofenac sodium (VOLTAREN) 1 % GEL Apply 2 g topically 4 times daily 5 Tube 5    mirtazapine (REMERON) 7.5 MG tablet Take 7.5 mg by mouth nightly      prazosin (MINIPRESS) 1 MG capsule Take 1 mg by mouth nightly      ziprasidone (GEODON) 20 MG capsule Take 20 mg by mouth 2 times daily (with meals)      omeprazole (PRILOSEC OTC) 20 MG tablet Take 1 tablet by mouth daily 30 tablet 1    fexofenadine (ALLEGRA ALLERGY) 180 MG tablet Take 1 tablet by mouth daily 30 tablet 3    fluticasone (FLOVENT HFA) 44 MCG/ACT inhaler Inhale 2 puffs into the lungs 2 times daily 1 Inhaler 3    albuterol sulfate HFA (VENTOLIN HFA) 108 (90 Base) MCG/ACT inhaler Inhale 2 puffs into the lungs every 4 hours as needed for Wheezing or Shortness of Breath 1 Inhaler 3    Multiple Vitamins-Minerals (MULTIVITAMIN WOMEN PO) Take 1 tablet by mouth daily      hydrOXYzine (VISTARIL) 25 MG capsule Take 25 mg by mouth 3 times daily as needed for Anxiety         Past Medical History:   Diagnosis Date    Allergic rhinitis     Anemia     Asthma     Depression     Headache     Herniated intervertebral disc of lumbar spine     Shortness of breath     Tobacco abuse        Past Surgical History:   Procedure Laterality Date     SECTION      PAIN MANAGEMENT PROCEDURE Left 2020    Left C7 TRANSFORAMINAL performed by Raisa Crespo MD at [facility] Left 2020    Left C7 TRANSFORAMINAL performed by Raisa Crespo MD at [address] [facility]      TYMPANOSTOMY [facility]       Family History   Problem Relation Age of Onset    Heart Disease Mother     Other Mother         respiratory illness    Kidney Disease Mother     Early Death Mother 45        cardiac arrest    Heart Disease Father     High Blood Pressure Father     Other Father         liver/intestinal    Lung Cancer Maternal Grandmother     Diabetes Other     Early Death Other     Other Other         respiratory illness    Early Death Other     Other Other         respiratory illness    Bleeding Prob Other     Thyroid Disease Other        Social History     Socioeconomic History    Marital status: Single     Spouse name: None    Number of children: None    Years of education: None    Highest education level: None   Occupational History    None   Social Needs    Financial resource strain: None    Food insecurity     Worry: None     Inability: None    Transportation needs     Medical: None     Non-medical: None   Tobacco Use    Smoking status: Current Every Day Smoker     Packs/day: 0.50     Years: 10.00     Pack years: 5.00     Types: E-Cigarettes, Cigarettes     Start date: 1/1/2004    Smokeless tobacco: Never Used    Tobacco comment: Pt advises will see PCP when ready to quit; no longer vapes.      Substance and Sexual Activity    Alcohol use: Yes     Frequency: Monthly or less     Drinks per session: 1 or 2     Binge frequency: Never    Drug use: Yes     Types: Marijuana    Sexual activity: Yes     Partners: Male     Birth control/protection: Surgical   Lifestyle    Physical activity     Days per week: None     Minutes per session: None    Stress: None   Relationships    Social connections     Talks on phone: None     Gets together: None     Attends Sikhism service: None     Active member of club or organization: None     Attends meetings of clubs or organizations: None     Relationship status: None    Intimate partner violence     Fear of current or ex partner: None     Emotionally abused: None     Physically abused: None     Forced sexual activity: None   Other Topics Concern    None   Social History Narrative    None     Review of Systems Constitutional: Positive for activity change. Respiratory: Negative. Cardiovascular: Negative. Gastrointestinal: Negative. Genitourinary: Negative. Musculoskeletal: Positive for arthralgias, myalgias and neck pain. Skin: Negative. Neurological: Positive for numbness. Psychiatric/Behavioral: Positive for sleep disturbance. Objective:   Physical Exam  Vitals signs reviewed. Constitutional:       General: She is not in acute distress. Appearance: She is well-developed. She is not ill-appearing, toxic-appearing or diaphoretic. Interventions: She is not intubated. HENT:      Head: Normocephalic and atraumatic. Neck:      Musculoskeletal: Muscular tenderness present. Pulmonary:      Effort: Pulmonary effort is normal. No tachypnea, bradypnea, accessory muscle usage, prolonged expiration, respiratory distress or retractions. She is not intubated. Musculoskeletal:      Right knee: She exhibits swelling and effusion. Comments: Painful weightbearing upon standing   Skin:     General: Skin is warm and dry. Capillary Refill: Capillary refill takes less than 2 seconds. Nails: There is no clubbing. Neurological:      Mental Status: She is alert and oriented to person, place, and time. GCS: GCS eye subscore is 4. GCS verbal subscore is 5. GCS motor subscore is 6. Cranial Nerves: No cranial nerve deficit. Psychiatric:         Attention and Perception: Attention and perception normal.         Mood and Affect: Mood and affect normal.         Speech: Speech normal.         Behavior: Behavior is cooperative. Cognition and Memory: Cognition normal.         Judgment: Judgment normal. Judgment is not impulsive or inappropriate. Assessment:       Diagnosis Orders   1. Cervical radiculopathy  Ambulatory referral to Physical Therapy    MRI CERVICAL SPINE WO CONTRAST   2.  Myofascial pain on left side  Ambulatory referral to Physical Therapy Plan:   EMG reviewed. Based on Dr. Marylu Garcia recommendation, will schedule C7/T1 x2, consider TPI's  PT referral for dry needling  Repeat cervical MRI    The patient was counseled at length about the risks of stewart Covid-19 during their perioperative period and any recovery window from their procedure. The patient was made aware that stewart Covid-19  may worsen their prognosis for recovering from their procedure  and lend to a higher morbidity and/or mortality risk. All material risks, benefits, and reasonable alternatives including postponing the procedure were discussed. The patient does wish to proceed with the procedure at this time.               Cheri Sultana, APRN - CNP

## 2021-01-25 NOTE — PATIENT INSTRUCTIONS
[facility]  [address] [place], [address]  Telephone [phone]  Fax [phone]    PROCEDURE INSTRUCTIONS FOR  PAIN MANAGEMENT PROCEDURES WITH ANESTHESIA/ IV SEDATION    Roxy England is scheduled to see Dr. Claude Magaña to undergo the following procedure:   C7-T1 TFE    Procedure Date: ____02/05/21 &  02/19/19____    You will receive a phone call the day prior to your procedure to confirm a time or arrival.    Report to the [place] [id number], Registration office on the 1st floor in the hospital, after check in and signing of paper work you will then go to the second floor to the surgery center. 1. Stop the following medications prior to the procedure:   Not on blood thinners  Stop blood thinners as directed before the injection, with permission from your cardiologist or primary care physician. We will send a letter to them requesting permission to hold the blood thinners. 2.  Take all routine medications unless otherwise instructed. Ok to take vitamins and antiinflammatory medications    3. EATING & DRINKING:  YOUR PROCEDURE REQUIRES ANESTHESIA, NO FOOD OR LIQUIDS FOR 8 HOURS PRIOR TO THE PROCEDURE    4. If you are allergic to contrast or iodine, you must take benadryl and prednisone prior to the injection to prevent an allergic reaction. Follow the directions on the prescription for the times to take the medication. 5.  Wear simple loose clothing, which can be easily changed. 6.  Leave jewelry (including rings) and other valuables at home. 7.  Make arrangements for a family member or friend to drive you to the surgery center. Your ride must stay in the hospital while you are having the injection done. If they cannot stay, the injection will be rescheduled. The sedation may affect your judgment following the procedure and driving a vehicle within 24 hours after the sedation could be dangerous. 8.  You will be asked to sign several forms prior to surgery; patients under the age of 25 must have a parent or legal guardian sign the permit to be able to do the procedure. 9.  You must have finished any antibiotic prescribed for recent infections. If required, please take pre-procedure antibiotic or other pre-procedure medications as instructed. 10. Bring inhalers and pain medications with you to your procedure. 11. Bring your MRI/CT films if they were done outside of the [facility]. 12. If you should develop a cold, sore throat, cough, fever or other new indication of illness or infection, or are started on antibiotics within 2 weeks of the scheduled procedure, please notify the [facility] office as early as possible at [phone]. If calling after 4:30pm the day prior to your scheduled procedure please contact [phone] and Leave a Voice Message.

## 2021-02-05 ENCOUNTER — HOSPITAL ENCOUNTER (OUTPATIENT)
Age: 36
Setting detail: OUTPATIENT SURGERY
Discharge: HOME | End: 2021-02-05
Attending: PHYSICAL MEDICINE & REHABILITATION | Admitting: PHYSICAL MEDICINE & REHABILITATION
Payer: MEDICARE

## 2021-02-05 ENCOUNTER — APPOINTMENT (OUTPATIENT)
Dept: GENERAL RADIOLOGY | Age: 36
End: 2021-02-05
Attending: PHYSICAL MEDICINE & REHABILITATION
Payer: MEDICARE

## 2021-02-05 VITALS
HEART RATE: 61 BPM | RESPIRATION RATE: 16 BRPM | DIASTOLIC BLOOD PRESSURE: 67 MMHG | HEIGHT: 64 IN | OXYGEN SATURATION: 99 % | TEMPERATURE: 97.8 F | BODY MASS INDEX: 40.63 KG/M2 | WEIGHT: 238 LBS | SYSTOLIC BLOOD PRESSURE: 124 MMHG

## 2021-02-05 PROCEDURE — 3209999900 FLUORO FOR SURGICAL PROCEDURES

## 2021-02-05 PROCEDURE — 2580000003 HC RX 258: Performed by: PHYSICAL MEDICINE & REHABILITATION

## 2021-02-05 PROCEDURE — 3600000054 HC PAIN LEVEL 3 BASE: Performed by: PHYSICAL MEDICINE & REHABILITATION

## 2021-02-05 PROCEDURE — 7100000011 HC PHASE II RECOVERY - ADDTL 15 MIN: Performed by: PHYSICAL MEDICINE & REHABILITATION

## 2021-02-05 PROCEDURE — 99152 MOD SED SAME PHYS/QHP 5/>YRS: CPT | Performed by: PHYSICAL MEDICINE & REHABILITATION

## 2021-02-05 PROCEDURE — 2500000003 HC RX 250 WO HCPCS: Performed by: PHYSICAL MEDICINE & REHABILITATION

## 2021-02-05 PROCEDURE — 7100000010 HC PHASE II RECOVERY - FIRST 15 MIN: Performed by: PHYSICAL MEDICINE & REHABILITATION

## 2021-02-05 PROCEDURE — 2709999900 HC NON-CHARGEABLE SUPPLY: Performed by: PHYSICAL MEDICINE & REHABILITATION

## 2021-02-05 PROCEDURE — 3600000055 HC PAIN LEVEL 3 ADDL 15 MIN: Performed by: PHYSICAL MEDICINE & REHABILITATION

## 2021-02-05 PROCEDURE — 62321 NJX INTERLAMINAR CRV/THRC: CPT | Performed by: PHYSICAL MEDICINE & REHABILITATION

## 2021-02-05 PROCEDURE — 6360000004 HC RX CONTRAST MEDICATION: Performed by: PHYSICAL MEDICINE & REHABILITATION

## 2021-02-05 PROCEDURE — 6360000002 HC RX W HCPCS: Performed by: PHYSICAL MEDICINE & REHABILITATION

## 2021-02-05 RX ORDER — SODIUM CHLORIDE, SODIUM LACTATE, POTASSIUM CHLORIDE, CALCIUM CHLORIDE 600; 310; 30; 20 MG/100ML; MG/100ML; MG/100ML; MG/100ML
INJECTION, SOLUTION INTRAVENOUS CONTINUOUS
Status: CANCELLED | OUTPATIENT
Start: 2021-02-05

## 2021-02-05 RX ORDER — SODIUM CHLORIDE, SODIUM LACTATE, POTASSIUM CHLORIDE, CALCIUM CHLORIDE 600; 310; 30; 20 MG/100ML; MG/100ML; MG/100ML; MG/100ML
INJECTION, SOLUTION INTRAVENOUS CONTINUOUS
Status: DISCONTINUED | OUTPATIENT
Start: 2021-02-05 | End: 2021-02-05 | Stop reason: HOSPADM

## 2021-02-05 RX ORDER — SODIUM CHLORIDE 0.9 % (FLUSH) 0.9 %
10 SYRINGE (ML) INJECTION PRN
Status: DISCONTINUED | OUTPATIENT
Start: 2021-02-05 | End: 2021-02-05 | Stop reason: HOSPADM

## 2021-02-05 RX ORDER — BUPIVACAINE HYDROCHLORIDE 2.5 MG/ML
INJECTION, SOLUTION EPIDURAL; INFILTRATION; INTRACAUDAL PRN
Status: DISCONTINUED | OUTPATIENT
Start: 2021-02-05 | End: 2021-02-05 | Stop reason: ALTCHOICE

## 2021-02-05 RX ORDER — SODIUM CHLORIDE 9 MG/ML
INJECTION INTRAVENOUS PRN
Status: DISCONTINUED | OUTPATIENT
Start: 2021-02-05 | End: 2021-02-05 | Stop reason: ALTCHOICE

## 2021-02-05 RX ORDER — SODIUM CHLORIDE 0.9 % (FLUSH) 0.9 %
10 SYRINGE (ML) INJECTION PRN
Status: CANCELLED | OUTPATIENT
Start: 2021-02-05

## 2021-02-05 RX ORDER — MIDAZOLAM HYDROCHLORIDE 1 MG/ML
INJECTION INTRAMUSCULAR; INTRAVENOUS PRN
Status: DISCONTINUED | OUTPATIENT
Start: 2021-02-05 | End: 2021-02-05 | Stop reason: ALTCHOICE

## 2021-02-05 RX ORDER — FENTANYL CITRATE 50 UG/ML
INJECTION, SOLUTION INTRAMUSCULAR; INTRAVENOUS PRN
Status: DISCONTINUED | OUTPATIENT
Start: 2021-02-05 | End: 2021-02-05 | Stop reason: ALTCHOICE

## 2021-02-05 RX ORDER — DEXAMETHASONE SODIUM PHOSPHATE 10 MG/ML
INJECTION INTRAMUSCULAR; INTRAVENOUS PRN
Status: DISCONTINUED | OUTPATIENT
Start: 2021-02-05 | End: 2021-02-05 | Stop reason: ALTCHOICE

## 2021-02-05 RX ORDER — SODIUM CHLORIDE 0.9 % (FLUSH) 0.9 %
10 SYRINGE (ML) INJECTION EVERY 12 HOURS SCHEDULED
Status: DISCONTINUED | OUTPATIENT
Start: 2021-02-05 | End: 2021-02-05 | Stop reason: HOSPADM

## 2021-02-05 RX ORDER — SODIUM CHLORIDE 0.9 % (FLUSH) 0.9 %
10 SYRINGE (ML) INJECTION EVERY 12 HOURS SCHEDULED
Status: CANCELLED | OUTPATIENT
Start: 2021-02-05

## 2021-02-05 RX ADMIN — SODIUM CHLORIDE, POTASSIUM CHLORIDE, SODIUM LACTATE AND CALCIUM CHLORIDE: 600; 310; 30; 20 INJECTION, SOLUTION INTRAVENOUS at 10:12

## 2021-02-05 ASSESSMENT — PAIN DESCRIPTION - DESCRIPTORS
DESCRIPTORS: ACHING
DESCRIPTORS: SHARP

## 2021-02-05 ASSESSMENT — PAIN SCALES - GENERAL: PAINLEVEL_OUTOF10: 3

## 2021-02-05 ASSESSMENT — PAIN DESCRIPTION - LOCATION
LOCATION: BACK
LOCATION: BACK

## 2021-02-05 ASSESSMENT — PAIN DESCRIPTION - ORIENTATION: ORIENTATION: UPPER

## 2021-02-05 ASSESSMENT — PAIN - FUNCTIONAL ASSESSMENT: PAIN_FUNCTIONAL_ASSESSMENT: 0-10

## 2021-02-05 NOTE — INTERVAL H&P NOTE
I have interviewed and examined the patient and reviewed the recent History and Physical.  There have been no changes to the recent H&P documentation. The surgical consent form has been signed. Last anticoagulant medication use was:na    Premedication taken for contrast allergy? na    Valium taken for oral sedation? na    Outpatient Medications Marked as Taking for the 2/5/21 encounter [facility] Encounter)   Medication Sig Dispense Refill    pregabalin (LYRICA) 200 MG capsule Take 1 capsule by mouth 3 times daily for 30 days.  90 capsule 5    celecoxib (CELEBREX) 200 MG capsule Take 1 capsule by mouth 2 times daily 60 capsule 3    Spacer/Aero-Holding Chambers BRAYAN 1 Device by Does not apply route daily as needed (as needed with inhaler) 1 Device 0    fluticasone (FLONASE) 50 MCG/ACT nasal spray SHAKE LIQUID AND USE 2 SPRAYS IN EACH NOSTRIL DAILY 16 g 3    ondansetron (ZOFRAN ODT) 8 MG TBDP disintegrating tablet Take 1 tablet by mouth every 8 hours as needed for Nausea 10 tablet 1    valACYclovir (VALTREX) 1 g tablet Take 1,000 mg by mouth 2 times daily      lamoTRIgine (LAMICTAL) 25 MG tablet Take 2 tablets by mouth daily      albuterol (PROVENTIL) (2.5 MG/3ML) 0.083% nebulizer solution Take 3 mLs by nebulization every 4 hours as needed for Wheezing 120 each 0    albuterol sulfate HFA (VENTOLIN HFA) 108 (90 Base) MCG/ACT inhaler Inhale 2 puffs into the lungs every 4 hours as needed for Wheezing ventolin 1 Inhaler 5    diclofenac sodium (VOLTAREN) 1 % GEL Apply 2 g topically 4 times daily 5 Tube 5    mirtazapine (REMERON) 7.5 MG tablet Take 7.5 mg by mouth nightly      prazosin (MINIPRESS) 1 MG capsule Take 1 mg by mouth nightly      ziprasidone (GEODON) 20 MG capsule Take 20 mg by mouth 2 times daily (with meals)      omeprazole (PRILOSEC OTC) 20 MG tablet Take 1 tablet by mouth daily 30 tablet 1    fexofenadine (ALLEGRA ALLERGY) 180 MG tablet Take 1 tablet by mouth daily 30 tablet 3 fluticasone (FLOVENT HFA) 44 MCG/ACT inhaler Inhale 2 puffs into the lungs 2 times daily 1 Inhaler 3    Multiple Vitamins-Minerals (MULTIVITAMIN WOMEN PO) Take 1 tablet by mouth daily      hydrOXYzine (VISTARIL) 25 MG capsule Take 25 mg by mouth 3 times daily as needed for Anxiety         The patient understands the planned operation and its associated risks and benefits and agrees to proceed.         Electronically signed by Zelalem Chapman MD on 2/5/2021 at 11:26 AM

## 2021-02-05 NOTE — OP NOTE
Operative Note  INTERLAMINAR EPIDURAL STEROID INJECTION    2/5/21  The patient was counseled at length about the risks of stewart Covid-19 during their perioperative period and any recovery window from their procedure. The patient was made aware that stewart Covid-19  may worsen their prognosis for recovering from their procedure  and lend to a higher morbidity and/or mortality risk. All material risks, benefits, and reasonable alternatives including postponing the procedure were discussed. The patient does wish to proceed with the procedure at this time. Surgeon: Russel Champion MD    Pre-operative Diagnosis:   Patient Active Problem List   Diagnosis Code    Tobacco abuse Z72.0    Depression F32.9    Dysmenorrhea N94.6    Vitamin D deficiency E55.9    Environmental allergies Z91.09    Moderate asthma J45.909    Cervical radiculopathy M54.12    Cervicalgia M54.2    Myofascial pain M79.18    Neuroforaminal stenosis of cervical spine M99.71       Post-operative Diagnosis: Same    Assistants: none    INDICATION:  Interlaminar epidural injection has been requested for diagnostic and therapeutic reasons. Please see H&P for details on previous treatments, examination findings, and work up. Conservative treatment was ineffective i.e.: ice, NSAIDS, rest,     Patient is unable to perform the following ADL's: toileting, personal cares, sitting and standing     Pain Assessment: 0-10  Pain Level: 6     Pain Orientation: Left, Upper  Pain Location: Back  Pain Descriptors: Sharp    Last Plain films: 2020    EXAMINATION:  1. C7-T1 Interlaminar radiculogram/epidurogram.   2. C7-T1 Interlaminar epidural anesthetic injection. 3. C7-T1 Interlaminar epidural steroid injection. CONSENT: Written consent was obtained from the patient on preprinted consent form after explaining the procedure, indications, potential complications and outcomes. Alternative treatments were also discussed. DISCUSSION: The patient was sterilely prepped and draped in the usual fashion in the   prone position. Time out was verified for correct patient, side, level and procedure. SEDATION: 0.5mg of Versed and 50mcg of fentanyl and 0 mg propofol IV were given IV pre procedurally and during the procedure. The patient remained fairly sedated throughout the procedure and underwent constant continuous EKG, pulse oximetry and blood pressure monitoring independently by the CRNA/RN as well as by myself. Sedation time was 13 minutes. PROCEDURE:  Under image-intensifier control, a 22 gauge needle x gauge needle x 2.5 inch spinal needle was guided successfully into the epidural space employing a posterior midline/paramedian interlaminar approach. Needle aspiration was negative for heme or CSF. Instillation of  .5 mL of Omnipaque 240 contrast medium opacified the spinal nerve and demonstrated contiguous flow into the epidural space. No vascular spread was noted. Digital subtraction was not employed to evaluate for vascular spread. The patient was monitored for any untoward reaction to contrast medium before proceeding. The patient did not report pain reproduction in a concordant distribution. Following needle position verification, a test dose of  .5 mL of sterile normal saline was administered and patient monitored for any adverse effects. Then, 1 mL of Dexamethasone (Decadron 10 mg/mL) was instilled into the epidural space and the patient's response was again monitored. Finally, 1ml of 0.5% bupivacaine was then instilled. The patient's response was again monitored. The spinal needle was removed, and the patient's pain response, gait pattern, sensorimotor examination and range of motion were examined. The patient tolerated the procedure well and without complications and was noted to be in stable condition prior to discharge from the procedure center with discharge instructions.     IMPRESSIONS: 1. C7-T1 Interlaminar epidurogram, epidural anesthesia and epidural steroid injection procedures accomplished without incident. EBL: no blood loss    SPECIMEN: none    RECOMMENDATIONS:  1. Complete and return Post-Procedure Pain and Activity Diary.    2. Contact the [address] for symptom exacerbation, fever or unusual symptoms. 3. Post-procedure care according to verbal and written discharge instructions    POST-PROCEDURE EPIDUROGRAPHY INTERPRETATION:    EXAMINATION: AP, lateral views. FLUORO TIME: 22 seconds    DISCUSSION: Spot views of the spine reveal normal alignment and segmentation. Spinal needle is positioned at the C7-T1 interlaminar space. Contrast spreads and outlines the epidural space. The epidurogram reveals excellent contrast flow. Visualized spine reveals See radiology report. Soft tissues reveal no abnormalities. IMPRESSION: C7-T1 interlaminar epidurogram/epineurogram reveals satisfactory needle position and contrast spread.      Electronically signed by Viji Saavedra MD on 2/5/2021 at 11:28 AM

## 2021-02-05 NOTE — H&P
.            Signed        Expand AllCollapse All     Show:Clear all  [x]Manual[x]Template[x]Copied    Added by:  [x]RAMON Her CNP    []Leilani for details  Subjective:      Patient ID: Em Erickson is a 39 y.o. female. Chief Complaint   Patient presents with    Neck Pain      Neck Pain   Associated symptoms include numbness. Shoulder Pain   Associated symptoms include numbness. Underwent EMG, flared her pain. Left sided pain. She started wearing wrist splints, no relief. Pain Assessment  Location of Pain: Neck  Location Modifiers: Left  Severity of Pain: 8  Quality of Pain: Throbbing, Sharp, Dull, Aching  Duration of Pain: Persistent  Frequency of Pain: Constant  Aggravating Factors: Bending(reaching, lifting arms)  Limiting Behavior: Yes  Relieving Factors: Rest, Ice, Heat(pressure (from bra))  Are there other pain locations you wish to document?: No           Allergies   Allergen Reactions    Norco [Hydrocodone-Acetaminophen] Other (See Comments)       Patient stated it gave her severe headaches         Active Medications          Outpatient Medications Marked as Taking for the 1/25/21 encounter (Office Visit) with RAMON Mendoza CNP   Medication Sig Dispense Refill    pregabalin (LYRICA) 200 MG capsule Take 1 capsule by mouth 3 times daily for 30 days.  90 capsule 5    celecoxib (CELEBREX) 200 MG capsule Take 1 capsule by mouth 2 times daily 60 capsule 3    amoxicillin-clavulanate (AUGMENTIN) 875-125 MG per tablet Take 1 tablet by mouth 2 times daily for 10 days 20 tablet 0    Spacer/Aero-Holding Chambers BRAYAN 1 Device by Does not apply route daily as needed (as needed with inhaler) 1 Device 0    fluticasone (FLONASE) 50 MCG/ACT nasal spray SHAKE LIQUID AND USE 2 SPRAYS IN EACH NOSTRIL DAILY 16 g 3    ondansetron (ZOFRAN ODT) 8 MG TBDP disintegrating tablet Take 1 tablet by mouth every 8 hours as needed for Nausea 10 tablet 1  valACYclovir (VALTREX) 1 g tablet Take 1,000 mg by mouth 2 times daily        lamoTRIgine (LAMICTAL) 25 MG tablet Take 2 tablets by mouth daily        albuterol (PROVENTIL) (2.5 MG/3ML) 0.083% nebulizer solution Take 3 mLs by nebulization every 4 hours as needed for Wheezing 120 each 0    albuterol sulfate HFA (VENTOLIN HFA) 108 (90 Base) MCG/ACT inhaler Inhale 2 puffs into the lungs every 4 hours as needed for Wheezing ventolin 1 Inhaler 5    diclofenac sodium (VOLTAREN) 1 % GEL Apply 2 g topically 4 times daily 5 Tube 5    mirtazapine (REMERON) 7.5 MG tablet Take 7.5 mg by mouth nightly        prazosin (MINIPRESS) 1 MG capsule Take 1 mg by mouth nightly        ziprasidone (GEODON) 20 MG capsule Take 20 mg by mouth 2 times daily (with meals)        omeprazole (PRILOSEC OTC) 20 MG tablet Take 1 tablet by mouth daily 30 tablet 1    fexofenadine (ALLEGRA ALLERGY) 180 MG tablet Take 1 tablet by mouth daily 30 tablet 3    fluticasone (FLOVENT HFA) 44 MCG/ACT inhaler Inhale 2 puffs into the lungs 2 times daily 1 Inhaler 3    albuterol sulfate HFA (VENTOLIN HFA) 108 (90 Base) MCG/ACT inhaler Inhale 2 puffs into the lungs every 4 hours as needed for Wheezing or Shortness of Breath 1 Inhaler 3    Multiple Vitamins-Minerals (MULTIVITAMIN WOMEN PO) Take 1 tablet by mouth daily        hydrOXYzine (VISTARIL) 25 MG capsule Take 25 mg by mouth 3 times daily as needed for Anxiety                Past Medical History        Past Medical History:   Diagnosis Date    Allergic rhinitis      Anemia      Asthma      Depression      Headache      Herniated intervertebral disc of lumbar spine      Shortness of breath      Tobacco abuse              Past Surgical History         Past Surgical History:   Procedure Laterality Date     SECTION   2008    PAIN MANAGEMENT PROCEDURE Left 2020     Left C7 TRANSFORAMINAL performed by Enid Lozada MD at [place] [address] Left 2020 Left C7 TRANSFORAMINAL performed by Paola Diaz MD at [address] [place] [address]    TYMPANOSTOMY TUBE PLACEMENT   1995            Family History         Family History   Problem Relation Age of Onset    Heart Disease Mother      Other Mother           respiratory illness    Kidney Disease Mother      Early Death Mother 45         cardiac arrest    Heart Disease Father      High Blood Pressure Father      Other Father           liver/intestinal    Lung Cancer Maternal Grandmother      Diabetes Other      Early Death Other      Other Other           respiratory illness    Early Death Other      Other Other           respiratory illness    Bleeding Prob Other      Thyroid Disease Other              Social History   Social History            Socioeconomic History    Marital status: Single       Spouse name: None    Number of children: None    Years of education: None    Highest education level: None   Occupational History    None   Social Needs    Financial resource strain: None    Food insecurity       Worry: None       Inability: None    Transportation needs       Medical: None       Non-medical: None   Tobacco Use    Smoking status: Current Every Day Smoker       Packs/day: 0.50       Years: 10.00       Pack years: 5.00       Types: E-Cigarettes, Cigarettes       Start date: 1/1/2004    Smokeless tobacco: Never Used    Tobacco comment: Pt advises will see PCP when ready to quit; no longer vapes.      Substance and Sexual Activity    Alcohol use: Yes       Frequency: Monthly or less       Drinks per session: 1 or 2       Binge frequency: Never    Drug use: Yes       Types: Marijuana    Sexual activity: Yes       Partners: Male       Birth control/protection: Surgical   Lifestyle    Physical activity       Days per week: None       Minutes per session: None    Stress: None   Relationships    Social connections Talks on phone: None       Gets together: None       Attends Hinduism service: None       Active member of club or organization: None       Attends meetings of clubs or organizations: None       Relationship status: None    Intimate partner violence       Fear of current or ex partner: None       Emotionally abused: None       Physically abused: None       Forced sexual activity: None   Other Topics Concern    None   Social History Narrative    None         Review of Systems   Constitutional: Positive for activity change. Respiratory: Negative. Cardiovascular: Negative. Gastrointestinal: Negative. Genitourinary: Negative. Musculoskeletal: Positive for arthralgias, myalgias and neck pain. Skin: Negative. Neurological: Positive for numbness. Psychiatric/Behavioral: Positive for sleep disturbance. Objective:   Physical Exam  Vitals signs reviewed. Constitutional:       General: She is not in acute distress. Appearance: She is well-developed. She is not ill-appearing, toxic-appearing or diaphoretic. Interventions: She is not intubated. HENT:      Head: Normocephalic and atraumatic. Neck:      Musculoskeletal: Muscular tenderness present. Pulmonary:      Effort: Pulmonary effort is normal. No tachypnea, bradypnea, accessory muscle usage, prolonged expiration, respiratory distress or retractions. She is not intubated. Musculoskeletal:      Right knee: She exhibits swelling and effusion. Comments: Painful weightbearing upon standing   Skin:     General: Skin is warm and dry. Capillary Refill: Capillary refill takes less than 2 seconds. Nails: There is no clubbing. Neurological:      Mental Status: She is alert and oriented to person, place, and time. GCS: GCS eye subscore is 4. GCS verbal subscore is 5. GCS motor subscore is 6. Cranial Nerves: No cranial nerve deficit.    Psychiatric: Attention and Perception: Attention and perception normal.         Mood and Affect: Mood and affect normal.         Speech: Speech normal.         Behavior: Behavior is cooperative. Cognition and Memory: Cognition normal.         Judgment: Judgment normal. Judgment is not impulsive or inappropriate. Assessment:     Diagnosis Orders   1. Cervical radiculopathy  Ambulatory referral to Physical Therapy     MRI CERVICAL SPINE WO CONTRAST   2. Myofascial pain on left side  Ambulatory referral to Physical Therapy                    Plan:   EMG reviewed. Based on Dr. Fara Landa recommendation, will schedule C7/T1 x2, consider TPI's  PT referral for dry needling  Repeat cervical MRI     The patient was counseled at length about the risks of stewart Covid-19 during their perioperative period and any recovery window from their procedure. The patient was made aware that stewart Covid-19  may worsen their prognosis for recovering from their procedure  and lend to a higher morbidity and/or mortality risk. All material risks, benefits, and reasonable alternatives including postponing the procedure were discussed. The patient does wish to proceed with the procedure at this time.                             Otf Aguilera, RAMON - CNP

## 2021-02-05 NOTE — PRE SEDATION
Sedation Pre-Procedure Note    Patient Name: Gail Strong   YOB: 1985  Room/Bed: [facility] OR [place]  Medical Record Number: [id number]  Date: 2021   Time: 11:27 AM       Indication:  C7-T1 IEsi    Consent: I have discussed with the patient and/or the patient representative the indication, alternatives, and the possible risks and/or complications of the planned procedure and the anesthesia methods. The patient and/or patient representative appear to understand and agree to proceed. Vital Signs:   Vitals:    21 0955   BP: 132/78   Pulse: 64   Resp: 18   Temp: 98.1 °F (36.7 °C)   SpO2: 100%       Past Medical History:   has a past medical history of Allergic rhinitis, Anemia, Asthma, Depression, Headache, Herniated intervertebral disc of lumbar spine, Shortness of breath, and Tobacco abuse. Past Surgical History:   has a past surgical history that includes Tonsillectomy and adenoidectomy (); Tympanostomy tube placement ();  section (); Tubal ligation (); Pain management procedure (Left, 2020); and Pain management procedure (Left, 2020). Medications:   Scheduled Meds:    sodium chloride flush  10 mL Intravenous 2 times per day     Continuous Infusions:    lactated ringers 100 mL/hr at 21 1012    lactated ringers       PRN Meds: sodium chloride flush  Home Meds:   Prior to Admission medications    Medication Sig Start Date End Date Taking? Authorizing Provider   pregabalin (LYRICA) 200 MG capsule Take 1 capsule by mouth 3 times daily for 30 days.  21 Yes RAMON Michaels CNP   celecoxib (CELEBREX) 200 MG capsule Take 1 capsule by mouth 2 times daily 21  Yes RAMON Michaels CNP   Spacer/Aero-Holding Aniwa [place] 1 Device by Does not apply route daily as needed (as needed with inhaler) 21  Yes RAMON Myers CNP fluticasone (FLONASE) 50 MCG/ACT nasal spray SHAKE LIQUID AND USE 2 SPRAYS IN [facility] NOSTRIL DAILY 12/22/20  Yes Gem Alvarenga MD   ondansetron (ZOFRAN ODT) 8 MG TBDP disintegrating tablet Take 1 tablet by mouth every 8 hours as needed for Nausea 12/17/20  Yes Catarina Louis [place] PA   valACYclovir (VALTREX) 1 g tablet Take 1,000 mg by mouth 2 times daily 10/13/20  Yes Historical Provider, MD   lamoTRIgine (LAMICTAL) 25 MG tablet Take 2 tablets by mouth daily 10/14/20  Yes Historical Provider, MD   albuterol (PROVENTIL) (2.5 MG/3ML) 0.083% nebulizer solution Take 3 mLs by nebulization every 4 hours as needed for Wheezing 11/23/20  Yes RAMON Fernandez NP   albuterol sulfate HFA (VENTOLIN HFA) 108 (90 Base) MCG/ACT inhaler Inhale 2 puffs into the lungs every 4 hours as needed for Wheezing ventolin 11/19/20  Yes Alisha Hilton   diclofenac sodium (VOLTAREN) 1 % GEL Apply 2 g topically 4 times daily 11/3/20  Yes RAMON Johnson CNP   mirtazapine (REMERON) 7.5 MG tablet Take 7.5 mg by mouth nightly 6/21/20  Yes Historical Provider, MD   prazosin (MINIPRESS) 1 MG capsule Take 1 mg by mouth nightly 6/21/20  Yes Historical Provider, MD   ziprasidone (GEODON) 20 MG capsule Take 20 mg by mouth 2 times daily (with meals) 6/21/20  Yes Historical Provider, MD   omeprazole (PRILOSEC OTC) 20 MG tablet Take 1 tablet by mouth daily 7/31/19  Yes Gem Alvarenga MD   fexofenadine TY [facility] ALLERGY) 180 MG tablet Take 1 tablet by mouth daily 7/31/19  Yes Gem Alvarenga MD   fluticasone (FLOVENT HFA) 44 MCG/ACT inhaler Inhale 2 puffs into the lungs 2 times daily 7/31/19  Yes Gem Alvarenga MD   Multiple Vitamins-Minerals (MULTIVITAMIN WOMEN PO) Take 1 tablet by mouth daily   Yes Historical Provider, MD   hydrOXYzine (VISTARIL) 25 MG capsule Take 25 mg by mouth 3 times daily as needed for Anxiety   Yes Historical Provider, MD     Coumadin Use Last 7 Days:  no  Antiplatelet drug therapy use last 7 days: no Other anticoagulant use last 7 days: no  Additional Medication Information:  . Pre-Sedation Documentation and Exam:   I have personally completed a history, physical exam & review of systems for this patient (see notes).     Mallampati Airway Assessment:  normal    Prior History of Anesthesia Complications:   none    ASA Classification:  Class 2 - A normal healthy patient with mild systemic disease    Sedation/ Anesthesia Plan:   intravenous sedation    Medications Planned:   morphine intravenously and ketamine intravenously    Patient is an appropriate candidate for plan of sedation: yes    Electronically signed by Sandy Stokes MD on 2/5/2021 at 11:27 AM

## 2021-02-10 ENCOUNTER — TELEPHONE (OUTPATIENT)
Dept: PAIN MANAGEMENT | Age: 36
End: 2021-02-10

## 2021-02-10 NOTE — TELEPHONE ENCOUNTER
Called patient and left a VM for her letting her know insurance has denied MRI and to keep her appt with our office in March.   Will need to cancel in RAD

## 2021-03-25 ENCOUNTER — HOSPITAL ENCOUNTER (EMERGENCY)
Age: 36
Discharge: HOME | End: 2021-03-25
Attending: EMERGENCY MEDICINE
Payer: MEDICARE

## 2021-03-25 VITALS
OXYGEN SATURATION: 99 % | HEIGHT: 64 IN | SYSTOLIC BLOOD PRESSURE: 145 MMHG | BODY MASS INDEX: 40.85 KG/M2 | DIASTOLIC BLOOD PRESSURE: 89 MMHG | HEART RATE: 66 BPM | RESPIRATION RATE: 16 BRPM | TEMPERATURE: 98.6 F

## 2021-03-25 DIAGNOSIS — L02.214 ABSCESS OF GROIN, LEFT: Primary | ICD-10-CM

## 2021-03-25 PROCEDURE — 10061 I&D ABSCESS COMP/MULTIPLE: CPT

## 2021-03-25 PROCEDURE — 6370000000 HC RX 637 (ALT 250 FOR IP): Performed by: EMERGENCY MEDICINE

## 2021-03-25 PROCEDURE — 99283 EMERGENCY DEPT VISIT LOW MDM: CPT

## 2021-03-25 PROCEDURE — 2500000003 HC RX 250 WO HCPCS: Performed by: EMERGENCY MEDICINE

## 2021-03-25 RX ORDER — LIDOCAINE HYDROCHLORIDE 10 MG/ML
20 INJECTION, SOLUTION INFILTRATION; PERINEURAL ONCE
Status: COMPLETED | OUTPATIENT
Start: 2021-03-25 | End: 2021-03-25

## 2021-03-25 RX ORDER — IBUPROFEN 800 MG/1
800 TABLET ORAL EVERY 8 HOURS PRN
Qty: 30 TABLET | Refills: 0 | Status: ON HOLD | OUTPATIENT
Start: 2021-03-25 | End: 2021-04-05 | Stop reason: ALTCHOICE

## 2021-03-25 RX ORDER — CEPHALEXIN 500 MG/1
500 CAPSULE ORAL 4 TIMES DAILY
Qty: 28 CAPSULE | Refills: 0 | Status: SHIPPED | OUTPATIENT
Start: 2021-03-25 | End: 2021-04-01

## 2021-03-25 RX ORDER — DIAPER,BRIEF,INFANT-TODD,DISP
EACH MISCELLANEOUS ONCE
Status: COMPLETED | OUTPATIENT
Start: 2021-03-25 | End: 2021-03-25

## 2021-03-25 RX ORDER — SULFAMETHOXAZOLE AND TRIMETHOPRIM 800; 160 MG/1; MG/1
1 TABLET ORAL 2 TIMES DAILY
Qty: 14 TABLET | Refills: 0 | Status: SHIPPED | OUTPATIENT
Start: 2021-03-25 | End: 2021-04-01

## 2021-03-25 RX ADMIN — BACITRACIN ZINC 1 G: 500 OINTMENT TOPICAL at 18:49

## 2021-03-25 RX ADMIN — LIDOCAINE HYDROCHLORIDE 20 ML: 10 INJECTION, SOLUTION INFILTRATION; PERINEURAL at 18:35

## 2021-03-25 ASSESSMENT — ENCOUNTER SYMPTOMS
COUGH: 0
VOMITING: 0
ABDOMINAL PAIN: 0
EYE PAIN: 0
BLOOD IN STOOL: 0
CONSTIPATION: 0
DIARRHEA: 0
NAUSEA: 0
BACK PAIN: 0
SHORTNESS OF BREATH: 0

## 2021-03-25 ASSESSMENT — PAIN DESCRIPTION - FREQUENCY: FREQUENCY: CONTINUOUS

## 2021-03-25 ASSESSMENT — PAIN SCALES - GENERAL: PAINLEVEL_OUTOF10: 8

## 2021-03-25 ASSESSMENT — PAIN DESCRIPTION - PAIN TYPE: TYPE: ACUTE PAIN

## 2021-03-25 ASSESSMENT — PAIN DESCRIPTION - ONSET: ONSET: ON-GOING

## 2021-03-25 ASSESSMENT — PAIN DESCRIPTION - ORIENTATION: ORIENTATION: LEFT

## 2021-03-25 NOTE — ED PROVIDER NOTES
[address] [facility]  eMERGENCY dEPARTMENT eNCOUnter      Pt Name: Devika Desai  MRN: [id number]  [place] 1985  Date of evaluation: 3/25/2021      CHIEF COMPLAINT       Chief Complaint   Patient presents with    Cellulitis     L groin         HISTORY OF PRESENT ILLNESS    Devika Desai is a 39 y.o. female who presents left groin abscess that began 2 to 3 days ago increased swelling she is had to have incision and drainage in that area before this been no fevers chills cough no shortness of breath      REVIEW OF SYSTEMS         Review of Systems   Constitutional: Negative for chills and fever. HENT: Negative for congestion and ear pain. Eyes: Negative for pain and visual disturbance. Respiratory: Negative for cough and shortness of breath. Cardiovascular: Negative for chest pain, palpitations and leg swelling. Gastrointestinal: Negative for abdominal pain, blood in stool, constipation, diarrhea, nausea and vomiting. Endocrine: Negative for polydipsia and polyuria. Genitourinary: Negative for difficulty urinating, dysuria, frequency, vaginal bleeding and vaginal discharge. Musculoskeletal: Negative for back pain, joint swelling, myalgias, neck pain and neck stiffness. Skin: Negative for rash. Redness and swelling her left groin region in her thigh   Neurological: Negative for dizziness, weakness and headaches. Hematological: Negative for adenopathy. Does not bruise/bleed easily. Psychiatric/Behavioral: Negative for confusion, self-injury and suicidal ideas. PAST MEDICAL HISTORY    has a past medical history of Allergic rhinitis, Anemia, Asthma, Depression, Headache, Herniated intervertebral disc of lumbar spine, Shortness of breath, and Tobacco abuse. SURGICAL HISTORY      has a past surgical history that includes Tonsillectomy and adenoidectomy (); Tympanostomy tube placement ();  section (); Tubal ligation ();  Pain management procedure (Left, 9/4/2020); Pain management procedure (Left, 9/18/2020); and Pain management procedure (Left, 2/5/2021). CURRENT MEDICATIONS       Previous Medications    ALBUTEROL (PROVENTIL) (2.5 MG/3ML) 0.083% NEBULIZER SOLUTION    Take 3 mLs by nebulization every 4 hours as needed for Wheezing    ALBUTEROL SULFATE HFA (VENTOLIN HFA) 108 (90 BASE) MCG/ACT INHALER    Inhale 2 puffs into the lungs every 4 hours as needed for Wheezing ventolin    CELECOXIB (CELEBREX) 200 MG CAPSULE    Take 1 capsule by mouth 2 times daily    DICLOFENAC SODIUM (VOLTAREN) 1 % GEL    Apply 2 g topically 4 times daily    FEXOFENADINE (ALLEGRA ALLERGY) 180 MG TABLET    Take 1 tablet by mouth daily    FLUTICASONE (FLONASE) 50 MCG/ACT NASAL SPRAY    SHAKE LIQUID AND USE 2 SPRAYS IN EACH NOSTRIL DAILY    FLUTICASONE (FLOVENT HFA) 44 MCG/ACT INHALER    Inhale 2 puffs into the lungs 2 times daily    HYDROXYZINE (VISTARIL) 25 MG CAPSULE    Take 25 mg by mouth 3 times daily as needed for Anxiety    LAMOTRIGINE (LAMICTAL) 25 MG TABLET    Take 2 tablets by mouth daily    MIRTAZAPINE (REMERON) 7.5 MG TABLET    Take 7.5 mg by mouth nightly    MULTIPLE VITAMINS-MINERALS (MULTIVITAMIN WOMEN PO)    Take 1 tablet by mouth daily    OMEPRAZOLE (PRILOSEC OTC) 20 MG TABLET    Take 1 tablet by mouth daily    ONDANSETRON (ZOFRAN ODT) 8 MG TBDP DISINTEGRATING TABLET    Take 1 tablet by mouth every 8 hours as needed for Nausea    PRAZOSIN (MINIPRESS) 1 MG CAPSULE    Take 1 mg by mouth nightly    PREGABALIN (LYRICA) 200 MG CAPSULE    Take 1 capsule by mouth 3 times daily for 30 days. SPACER/AERO-HOLDING CHAMBERS BRAYAN    1 Device by Does not apply route daily as needed (as needed with inhaler)    VALACYCLOVIR (VALTREX) 1 G TABLET    Take 1,000 mg by mouth 2 times daily    ZIPRASIDONE (GEODON) 20 MG CAPSULE    Take 20 mg by mouth 2 times daily (with meals)       ALLERGIES     is allergic to norco [hydrocodone-acetaminophen].     FAMILY HISTORY     She indicated that her mother is . She indicated that her father is alive. She indicated that all of her three sisters are alive. She indicated that the status of her maternal grandmother is unknown. She indicated that only one of her three others is alive. family history includes Bleeding Prob in an other family member; Diabetes in an other family member; Early Death in some other family members; Early Death (age of onset: 45) in her mother; Heart Disease in her father and mother; High Blood Pressure in her father; Kidney Disease in her mother; Shaniko Donte in her maternal grandmother; Other in her father, mother, and other family members; Thyroid Disease in an other family member. SOCIAL HISTORY      reports that she has been smoking e-cigarettes and cigarettes. She started smoking about 17 years ago. She has a 5.00 pack-year smoking history. She has never used smokeless tobacco. She reports current alcohol use. She reports current drug use. Drug: Marijuana. PHYSICAL EXAM     INITIAL VITALS:  height is 5' 4\" (1.626 m). Her tympanic temperature is 98.6 °F (37 °C). Her blood pressure is 145/89 (abnormal) and her pulse is 66. Her respiration is 16 and oxygen saturation is 99%. Physical Exam  Constitutional:       Appearance: Normal appearance. She is well-developed. HENT:      Head: Normocephalic and atraumatic. Left Ear: External ear normal.   Eyes:      Conjunctiva/sclera: Conjunctivae normal.      Pupils: Pupils are equal, round, and reactive to light. Neck:      Musculoskeletal: Normal range of motion. Cardiovascular:      Rate and Rhythm: Normal rate and regular rhythm. Pulmonary:      Effort: Pulmonary effort is normal.      Breath sounds: Normal breath sounds. Abdominal:      General: Bowel sounds are normal.      Palpations: Abdomen is soft. Musculoskeletal: Normal range of motion. General: No tenderness.       Comments: Patient has some some swelling and slight erythema in the left inner thigh there is no fluctuance but it is very tender there is a previous scar from previous I&D in the same area   Skin:     General: Skin is warm and dry. Neurological:      General: No focal deficit present. Mental Status: She is alert and oriented to person, place, and time. Psychiatric:         Behavior: Behavior normal.           DIFFERENTIAL DIAGNOSIS/ MDM:   Abscess left inner thigh will needle aspirate and an I&D    DIAGNOSTIC RESULTS     EKG: All EKG's are interpreted by the Emergency Department Physician who either signs or Co-signs this chart in the absence of a cardiologist.        RADIOLOGY:   I directly visualized the following  images and reviewed the radiologist interpretations:          ED BEDSIDE ULTRASOUND:       LABS:  Labs Reviewed - No data to display        EMERGENCY DEPARTMENT COURSE:   Vitals:    Vitals:    21 1755   BP: (!) 145/89   Pulse: 66   Resp: 16   Temp: 98.6 °F (37 °C)   TempSrc: Tympanic   SpO2: 99%   Height: 5' 4\" (1.626 m)     -------------------------  BP: (!) 145/89, Temp: 98.6 °F (37 °C), Pulse: 66, Resp: 16        Re-evaluation Notes        CRITICAL CARE:   None        CONSULTS:      PROCEDURES:  Incision and drainage of left inner thigh abscess prep was Betadine anesthetic was 1% lidocaine 3 cc were used in local fashion then using an 18-gauge needle I aspirated the cavity a little bit of cloudy material returned then using a 15 blade scalpel I made a 1 cm incision into the cavity and using blunt dissection broke up the loculations was very little purulent material returned this may be just an early abscess. Bacitracin and a dressing was applied she was instructed on warm soaks and given a surgical referral    FINAL IMPRESSION      1.  Abscess of groin, left          DISPOSITION/PLAN   DISPOSITION Decision To Discharge    Condition on Disposition    Stable    PATIENT REFERRED TO:  Cato Apgar, MD  [address] [place] [id number]

## 2021-03-25 NOTE — ED NOTES
Bacitracin applied to left inner groin/upper thigh cellulitis. Area is covered with gauze sponges, a folded in half ABD, and an open ABD, secured in place with silk tape. The area was no longer bleeding when writer provided wound care. Patient verbalizes understanding of wound care and s/s of infection.      Monika Velarde RN  03/25/21 [id number]

## 2021-03-26 ENCOUNTER — CARE COORDINATION (OUTPATIENT)
Dept: CARE COORDINATION | Age: 36
End: 2021-03-26

## 2021-03-26 ENCOUNTER — TELEMEDICINE (OUTPATIENT)
Dept: PAIN MANAGEMENT | Age: 36
End: 2021-03-26
Payer: MEDICARE

## 2021-03-26 ENCOUNTER — NURSE ONLY (OUTPATIENT)
Dept: LAB | Age: 36
End: 2021-03-26
Payer: MEDICARE

## 2021-03-26 DIAGNOSIS — M79.18 MYOFASCIAL PAIN: ICD-10-CM

## 2021-03-26 DIAGNOSIS — G43.001 MIGRAINE WITHOUT AURA AND WITH STATUS MIGRAINOSUS, NOT INTRACTABLE: Primary | ICD-10-CM

## 2021-03-26 DIAGNOSIS — M48.02 NEUROFORAMINAL STENOSIS OF CERVICAL SPINE: ICD-10-CM

## 2021-03-26 DIAGNOSIS — M54.12 CERVICAL RADICULOPATHY: ICD-10-CM

## 2021-03-26 DIAGNOSIS — M54.2 CERVICALGIA: ICD-10-CM

## 2021-03-26 PROCEDURE — 99214 OFFICE O/P EST MOD 30 MIN: CPT | Performed by: NURSE PRACTITIONER

## 2021-03-26 PROCEDURE — 99211 OFF/OP EST MAY X REQ PHY/QHP: CPT | Performed by: NURSE PRACTITIONER

## 2021-03-26 PROCEDURE — G8427 DOCREV CUR MEDS BY ELIG CLIN: HCPCS | Performed by: NURSE PRACTITIONER

## 2021-03-26 RX ORDER — KETOROLAC TROMETHAMINE 30 MG/ML
60 INJECTION, SOLUTION INTRAMUSCULAR; INTRAVENOUS ONCE
Status: COMPLETED | OUTPATIENT
Start: 2021-03-26 | End: 2021-03-26

## 2021-03-26 RX ORDER — PROMETHAZINE HYDROCHLORIDE 25 MG/ML
25 INJECTION, SOLUTION INTRAMUSCULAR; INTRAVENOUS ONCE
Status: COMPLETED | OUTPATIENT
Start: 2021-03-26 | End: 2021-03-26

## 2021-03-26 RX ADMIN — KETOROLAC TROMETHAMINE 60 MG: 30 INJECTION, SOLUTION INTRAMUSCULAR at 16:19

## 2021-03-26 RX ADMIN — PROMETHAZINE HYDROCHLORIDE 25 MG: 25 INJECTION INTRAMUSCULAR; INTRAVENOUS at 16:20

## 2021-03-26 ASSESSMENT — ENCOUNTER SYMPTOMS
RESPIRATORY NEGATIVE: 1
GASTROINTESTINAL NEGATIVE: 1

## 2021-03-26 NOTE — PROGRESS NOTES
Toradol 60 mg and Phenergan 25 mg, given IM right dorsogluteal, as ordered. Patient tolerated it well. No questions re: patient education handout on Toradol and Phenergan.

## 2021-03-26 NOTE — PROGRESS NOTES
3/26/2021    TELEHEALTH EVALUATION -- Audio/Visual (During MREPV-20 public health emergency)    HPI:    Kevin Quintana (:  1985) has requested an audio/video evaluation for the following concern(s):    Underwent C7/T1 with minimal relief. Starting dry needling next week via physical therapy    Migraine x2 days, upset stomach and nausea    Review of Systems   Constitutional: Positive for activity change. Respiratory: Negative. Cardiovascular: Negative. Gastrointestinal: Negative. Genitourinary: Negative. Musculoskeletal: Positive for arthralgias, myalgias and neck pain. Skin: Negative. Neurological: Positive for numbness and headaches. Psychiatric/Behavioral: Positive for sleep disturbance. Prior to Visit Medications    Medication Sig Taking? Authorizing Provider   sulfamethoxazole-trimethoprim (BACTRIM DS) 800-160 MG per tablet Take 1 tablet by mouth 2 times daily for 7 days  Sravan Monte MD   cephALEXin (KEFLEX) 500 MG capsule Take 1 capsule by mouth 4 times daily for 7 days  Sravan Monte MD   ibuprofen (ADVIL;MOTRIN) 800 MG tablet Take 1 tablet by mouth every 8 hours as needed for Pain  Sravan Monte MD   pregabalin (LYRICA) 200 MG capsule Take 1 capsule by mouth 3 times daily for 30 days.   RAMON Burgos CNP   celecoxib (CELEBREX) 200 MG capsule Take 1 capsule by mouth 2 times daily  RAMON Brugos CNP   Spacer/Aero-Holding Radha Jose Elias 1 Device by Does not apply route daily as needed (as needed with inhaler)  RAMON Beltre CNP   fluticasone (FLONASE) 50 MCG/ACT nasal spray SHAKE LIQUID AND USE 2 SPRAYS IN EACH NOSTRIL DAILY  Luther Ribera MD   ondansetron (ZOFRAN ODT) 8 MG TBDP disintegrating tablet Take 1 tablet by mouth every 8 hours as needed for Nausea  DRAKE Patel   valACYclovir (VALTREX) 1 g tablet Take 1,000 mg by mouth 2 times daily  Historical Provider, MD   lamoTRIgine (LAMICTAL) 25 MG tablet Take 2 tablets by mouth daily  Historical Provider, MD   albuterol (PROVENTIL) (2.5 MG/3ML) 0.083% nebulizer solution Take 3 mLs by nebulization every 4 hours as needed for Wheezing  RAMON Hurt - CNP   albuterol sulfate HFA (VENTOLIN HFA) 108 (90 Base) MCG/ACT inhaler Inhale 2 puffs into the lungs every 4 hours as needed for Wheezing ventolin  DRAKE Rose   diclofenac sodium (VOLTAREN) 1 % GEL Apply 2 g topically 4 times daily  Christiane Chaudhry APRN - CNP   mirtazapine (REMERON) 7.5 MG tablet Take 7.5 mg by mouth nightly  Historical Provider, MD   prazosin (MINIPRESS) 1 MG capsule Take 1 mg by mouth nightly  Historical Provider, MD   ziprasidone (GEODON) 20 MG capsule Take 20 mg by mouth 2 times daily (with meals)  Historical Provider, MD   omeprazole (PRILOSEC OTC) 20 MG tablet Take 1 tablet by mouth daily  Alison Haque MD   fexofenadine [facility] ALLERGY) 180 MG tablet Take 1 tablet by mouth daily  Alison Haque MD   fluticasone (FLOVENT HFA) 44 MCG/ACT inhaler Inhale 2 puffs into the lungs 2 times daily  Alison Haque MD   Multiple Vitamins-Minerals (MULTIVITAMIN WOMEN PO) Take 1 tablet by mouth daily  Historical Provider, MD   hydrOXYzine (VISTARIL) 25 MG capsule Take 25 mg by mouth 3 times daily as needed for Anxiety  Historical Provider, MD       Social History     Tobacco Use    Smoking status: Current Every Day Smoker     Packs/day: 0.50     Years: 10.00     Pack years: 5.00     Types: E-Cigarettes, Cigarettes     Start date: 1/1/2004    Smokeless tobacco: Never Used    Tobacco comment: Pt advises will see PCP when ready to quit; no longer vapes.      Substance Use Topics    Alcohol use: Yes     Frequency: Monthly or less     Drinks per session: 1 or 2     Binge frequency: Never    Drug use: Yes     Types: Marijuana            PHYSICAL EXAMINATION:  [ INSTRUCTIONS:  \"[x]\" Indicates a positive item  \"[]\" Indicates a negative item  -- DELETE ALL ITEMS NOT EXAMINED]  Vital Signs: (As obtained by patient/caregiver or practitioner observation)    Blood pressure-  Heart rate-    Respiratory rate-    Temperature-  Pulse oximetry-     Constitutional: [x] Appears well-developed and well-nourished [x] No apparent distress      [] Abnormal-   Mental status  [x] Alert and awake  [x] Oriented to person/place/time [x]Able to follow commands      Eyes:  EOM    [x]  Normal  [] Abnormal-  Sclera  [x]  Normal  [] Abnormal -         Discharge [x]  None visible  [] Abnormal -    HENT:   [] Normocephalic, atraumatic. [] Abnormal   [x] Mouth/Throat: Mucous membranes are moist.     External Ears [x] Normal  [] Abnormal-     Neck: [x] No visualized mass     Pulmonary/Chest: [x] Respiratory effort normal.  [x] No visualized signs of difficulty breathing or respiratory distress        [] Abnormal-      Musculoskeletal:   [] Normal gait with no signs of ataxia         [] Normal range of motion of neck        [x] Abnormal- neck pain    Neurological:        [x] No Facial Asymmetry (Cranial nerve 7 motor function) (limited exam to video visit)          [x] No gaze palsy        [] Abnormal-         Skin:        [x] No significant exanthematous lesions or discoloration noted on facial skin         [] Abnormal-            Psychiatric:       [x] Normal Affect [x] No Hallucinations        [] Abnormal-     Other pertinent observable physical exam findings-     ASSESSMENT/PLAN:   Diagnosis Orders   1. Migraine without aura and with status migrainosus, not intractable  ketorolac (TORADOL) injection 60 mg    promethazine (PHENERGAN) injection 25 mg   2. Myofascial pain     3. Neuroforaminal stenosis of cervical spine     4. Cervicalgia     5. Cervical radiculopathy       Follow up one month, consider MRI versus reordering second cervical interlaminar  Pursue dry needling as planned  Toradol 60mg IM and Phenergan 25mg IM to be given in injection room for migraine    No follow-ups on file.     Piedad Koroma, was evaluated through a synchronous (real-time) audio-video encounter. The patient (or guardian if applicable) is aware that this is a billable service. Verbal consent to proceed has been obtained within the past 12 months. The visit was conducted pursuant to the emergency declaration under the [address] [place] [address], [address] [place] [address] authority and the [employer] General Act. Patient identification was verified, and a caregiver was present when appropriate. The patient was located in a state where the provider was credentialed to provide care. --RAMON Singleton CNP on 3/26/2021 at 11:18 AM    An electronic signature was used to authenticate this note.

## 2021-03-29 ENCOUNTER — TELEMEDICINE (OUTPATIENT)
Dept: PAIN MANAGEMENT | Age: 36
End: 2021-03-29
Payer: MEDICARE

## 2021-03-29 ENCOUNTER — NURSE ONLY (OUTPATIENT)
Dept: LAB | Age: 36
End: 2021-03-29
Payer: MEDICARE

## 2021-03-29 DIAGNOSIS — G43.001 MIGRAINE WITHOUT AURA AND WITH STATUS MIGRAINOSUS, NOT INTRACTABLE: Primary | ICD-10-CM

## 2021-03-29 DIAGNOSIS — M79.18 MYOFASCIAL PAIN: ICD-10-CM

## 2021-03-29 DIAGNOSIS — M48.02 NEUROFORAMINAL STENOSIS OF CERVICAL SPINE: Primary | ICD-10-CM

## 2021-03-29 DIAGNOSIS — M54.12 CERVICAL RADICULOPATHY: ICD-10-CM

## 2021-03-29 DIAGNOSIS — G43.001 MIGRAINE WITHOUT AURA AND WITH STATUS MIGRAINOSUS, NOT INTRACTABLE: ICD-10-CM

## 2021-03-29 DIAGNOSIS — M54.2 CERVICALGIA: ICD-10-CM

## 2021-03-29 PROCEDURE — G8427 DOCREV CUR MEDS BY ELIG CLIN: HCPCS | Performed by: NURSE PRACTITIONER

## 2021-03-29 PROCEDURE — 99214 OFFICE O/P EST MOD 30 MIN: CPT | Performed by: NURSE PRACTITIONER

## 2021-03-29 PROCEDURE — 96372 THER/PROPH/DIAG INJ SC/IM: CPT

## 2021-03-29 PROCEDURE — 99211 OFF/OP EST MAY X REQ PHY/QHP: CPT

## 2021-03-29 RX ORDER — KETOROLAC TROMETHAMINE 30 MG/ML
60 INJECTION, SOLUTION INTRAMUSCULAR; INTRAVENOUS ONCE
Status: COMPLETED | OUTPATIENT
Start: 2021-03-29 | End: 2021-03-29

## 2021-03-29 RX ORDER — KETOROLAC TROMETHAMINE 10 MG/1
10 TABLET, FILM COATED ORAL EVERY 6 HOURS PRN
Qty: 20 TABLET | Refills: 0 | Status: ON HOLD | OUTPATIENT
Start: 2021-03-29 | End: 2021-04-05 | Stop reason: ALTCHOICE

## 2021-03-29 RX ORDER — PROMETHAZINE HYDROCHLORIDE 25 MG/1
25 TABLET ORAL 4 TIMES DAILY PRN
Qty: 20 TABLET | Refills: 0 | Status: ON HOLD | OUTPATIENT
Start: 2021-03-29 | End: 2021-04-05

## 2021-03-29 RX ORDER — PROMETHAZINE HYDROCHLORIDE 25 MG/ML
25 INJECTION, SOLUTION INTRAMUSCULAR; INTRAVENOUS ONCE
Status: COMPLETED | OUTPATIENT
Start: 2021-03-29 | End: 2021-03-29

## 2021-03-29 RX ADMIN — PROMETHAZINE HYDROCHLORIDE 25 MG: 25 INJECTION INTRAMUSCULAR; INTRAVENOUS at 16:23

## 2021-03-29 RX ADMIN — KETOROLAC TROMETHAMINE 60 MG: 30 INJECTION, SOLUTION INTRAMUSCULAR at 16:24

## 2021-03-29 ASSESSMENT — ENCOUNTER SYMPTOMS
RESPIRATORY NEGATIVE: 1
GASTROINTESTINAL NEGATIVE: 1

## 2021-03-29 NOTE — PROGRESS NOTES
3/29/2021    TELEHEALTH EVALUATION -- Audio/Visual (During ZQZPI-37 public health emergency)    HPI:    Lillie Escalona (:  1985) has requested an audio/video evaluation for the following concern(s):    Underwent C7/T1 with minimal relief. Starting dry needling this week via PT    Migraine, upset stomach and nausea. Toradol injection last week effective for one day only, migraine returned    Review of Systems   Constitutional: Positive for activity change. Respiratory: Negative. Cardiovascular: Negative. Gastrointestinal: Negative. Genitourinary: Negative. Musculoskeletal: Positive for arthralgias, myalgias and neck pain. Skin: Negative. Neurological: Positive for numbness and headaches. Psychiatric/Behavioral: Positive for sleep disturbance. Prior to Visit Medications    Medication Sig Taking? Authorizing Provider   promethazine (PHENERGAN) 25 MG tablet Take 1 tablet by mouth 4 times daily as needed for Nausea Yes Kiesha Barrera APRN - CNP   ketorolac (TORADOL) 10 MG tablet Take 1 tablet by mouth every 6 hours as needed for Pain Yes Kiesha Barrera APRN - CNP   sulfamethoxazole-trimethoprim (BACTRIM DS) 800-160 MG per tablet Take 1 tablet by mouth 2 times daily for 7 days  Zarina Montes MD   cephALEXin (KEFLEX) 500 MG capsule Take 1 capsule by mouth 4 times daily for 7 days  Zarina Montes MD   ibuprofen (ADVIL;MOTRIN) 800 MG tablet Take 1 tablet by mouth every 8 hours as needed for Pain  Zarina Montes MD   pregabalin (LYRICA) 200 MG capsule Take 1 capsule by mouth 3 times daily for 30 days.   Kiesha Barrera APRN - CNP   celecoxib (CELEBREX) 200 MG capsule Take 1 capsule by mouth 2 times daily  Kiesha Bruce, APRN - CNP   Spacer/Aero-Holding Laquita Wangwers 1 Device by Does not apply route daily as needed (as needed with inhaler)  RAMON Patel CNP   fluticasone (FLONASE) 50 MCG/ACT nasal spray SHAKE LIQUID AND USE 2 SPRAYS IN EACH NOSTRIL MRI versus reordering second cervical interlaminar  Pursue dry needling as planned  Repeat Toradol 60mg IM and Phenergan 25mg IM to be given in injection room for migraine, Toradol and Phenergan Rx oral sent to pharmacy as well. Instructed to stop celebrex while taking toradol    No follow-ups on file. Noreen Arriaza, was evaluated through a synchronous (real-time) audio-video encounter. The patient (or guardian if applicable) is aware that this is a billable service. Verbal consent to proceed has been obtained within the past 12 months. The visit was conducted pursuant to the emergency declaration under the [facility][note 3][facility], [address] [place] [address] authority and the [employer] General Act. Patient identification was verified, and a caregiver was present when appropriate. The patient was located in a state where the provider was credentialed to provide care. --RAMON Plummer - CNP on 3/29/2021 at 1:58 PM    An electronic signature was used to authenticate this note.

## 2021-04-04 ENCOUNTER — HOSPITAL ENCOUNTER (INPATIENT)
Age: 36
LOS: 4 days | Discharge: HOME | DRG: 751 | End: 2021-04-08
Attending: PSYCHIATRY & NEUROLOGY | Admitting: PSYCHIATRY & NEUROLOGY
Payer: MEDICARE

## 2021-04-04 PROBLEM — F29 PSYCHOSIS (HCC): Status: ACTIVE | Noted: 2021-04-04

## 2021-04-04 LAB
-: ABNORMAL
AMORPHOUS: ABNORMAL
BACTERIA: ABNORMAL
BILIRUBIN URINE: ABNORMAL
CASTS UA: ABNORMAL /LPF
COLOR: ABNORMAL
COMMENT UA: ABNORMAL
CRYSTALS, UA: ABNORMAL /HPF
EPITHELIAL CELLS UA: ABNORMAL /HPF
GLUCOSE URINE: NEGATIVE
HCT VFR BLD CALC: 43.6 % (ref 36–46)
HEMOGLOBIN: 14.5 G/DL (ref 12–16)
KETONES, URINE: ABNORMAL
LEUKOCYTE ESTERASE, URINE: ABNORMAL
MCH RBC QN AUTO: 29.4 PG (ref 26–34)
MCHC RBC AUTO-ENTMCNC: 33.3 G/DL (ref 31–37)
MCV RBC AUTO: 88.3 FL (ref 80–100)
MUCUS: ABNORMAL
NITRITE, URINE: NEGATIVE
NRBC AUTOMATED: ABNORMAL PER 100 WBC
OTHER OBSERVATIONS UA: ABNORMAL
PDW BLD-RTO: 13.3 % (ref 11.5–14.9)
PH UA: 6 (ref 5–8)
PLATELET # BLD: 310 K/UL (ref 150–450)
PMV BLD AUTO: 8.3 FL (ref 6–12)
PROTEIN UA: NEGATIVE
RBC # BLD: 4.93 M/UL (ref 4–5.2)
RBC UA: ABNORMAL /HPF
RENAL EPITHELIAL, UA: ABNORMAL /HPF
SPECIFIC GRAVITY UA: 1.02 (ref 1–1.03)
TRICHOMONAS: ABNORMAL
TURBIDITY: CLEAR
URINE HGB: NEGATIVE
UROBILINOGEN, URINE: ABNORMAL
WBC # BLD: 18.3 K/UL (ref 3.5–11)
WBC UA: ABNORMAL /HPF
YEAST: ABNORMAL

## 2021-04-04 PROCEDURE — 81001 URINALYSIS AUTO W/SCOPE: CPT

## 2021-04-04 PROCEDURE — 1240000000 HC EMOTIONAL WELLNESS R&B

## 2021-04-04 PROCEDURE — 85027 COMPLETE CBC AUTOMATED: CPT

## 2021-04-04 PROCEDURE — 36415 COLL VENOUS BLD VENIPUNCTURE: CPT

## 2021-04-04 RX ORDER — IBUPROFEN 400 MG/1
400 TABLET ORAL EVERY 6 HOURS PRN
Status: DISCONTINUED | OUTPATIENT
Start: 2021-04-04 | End: 2021-04-05

## 2021-04-04 RX ORDER — HALOPERIDOL 5 MG
5 TABLET ORAL EVERY 4 HOURS PRN
Status: DISCONTINUED | OUTPATIENT
Start: 2021-04-04 | End: 2021-04-08 | Stop reason: HOSPADM

## 2021-04-04 RX ORDER — HALOPERIDOL 5 MG/ML
5 INJECTION INTRAMUSCULAR EVERY 4 HOURS PRN
Status: DISCONTINUED | OUTPATIENT
Start: 2021-04-04 | End: 2021-04-08 | Stop reason: HOSPADM

## 2021-04-04 RX ORDER — MAGNESIUM HYDROXIDE/ALUMINUM HYDROXICE/SIMETHICONE 120; 1200; 1200 MG/30ML; MG/30ML; MG/30ML
30 SUSPENSION ORAL EVERY 6 HOURS PRN
Status: DISCONTINUED | OUTPATIENT
Start: 2021-04-04 | End: 2021-04-08 | Stop reason: HOSPADM

## 2021-04-04 RX ORDER — DIPHENHYDRAMINE HYDROCHLORIDE 50 MG/ML
50 INJECTION INTRAMUSCULAR; INTRAVENOUS EVERY 4 HOURS PRN
Status: DISCONTINUED | OUTPATIENT
Start: 2021-04-04 | End: 2021-04-08 | Stop reason: HOSPADM

## 2021-04-04 RX ORDER — HYDROXYZINE 50 MG/1
50 TABLET, FILM COATED ORAL 3 TIMES DAILY PRN
Status: DISCONTINUED | OUTPATIENT
Start: 2021-04-04 | End: 2021-04-05 | Stop reason: DRUGHIGH

## 2021-04-04 RX ORDER — NICOTINE 21 MG/24HR
1 PATCH, TRANSDERMAL 24 HOURS TRANSDERMAL DAILY
Status: DISCONTINUED | OUTPATIENT
Start: 2021-04-04 | End: 2021-04-08 | Stop reason: HOSPADM

## 2021-04-04 RX ORDER — POLYETHYLENE GLYCOL 3350 17 G/17G
17 POWDER, FOR SOLUTION ORAL DAILY PRN
Status: DISCONTINUED | OUTPATIENT
Start: 2021-04-04 | End: 2021-04-08 | Stop reason: HOSPADM

## 2021-04-04 RX ORDER — LORAZEPAM 2 MG/ML
2 INJECTION INTRAMUSCULAR EVERY 4 HOURS PRN
Status: DISCONTINUED | OUTPATIENT
Start: 2021-04-04 | End: 2021-04-08 | Stop reason: HOSPADM

## 2021-04-04 RX ORDER — LORAZEPAM 1 MG/1
2 TABLET ORAL EVERY 4 HOURS PRN
Status: DISCONTINUED | OUTPATIENT
Start: 2021-04-04 | End: 2021-04-08 | Stop reason: HOSPADM

## 2021-04-04 RX ORDER — TRAZODONE HYDROCHLORIDE 50 MG/1
50 TABLET ORAL NIGHTLY PRN
Status: DISCONTINUED | OUTPATIENT
Start: 2021-04-04 | End: 2021-04-05

## 2021-04-04 ASSESSMENT — PAIN SCALES - GENERAL: PAINLEVEL_OUTOF10: 0

## 2021-04-04 ASSESSMENT — LIFESTYLE VARIABLES: HISTORY_ALCOHOL_USE: NO

## 2021-04-04 NOTE — PROGRESS NOTES
Leisure assessment unable to be completed on this date due to patient resting in her room. Leisure assessment will be completed on the next earliest date.

## 2021-04-04 NOTE — BH NOTE
Pt. Declined to attend [address] [facility] group. Pt. Offered 1:1 talk time as an alternative to group. Pt. Declined.

## 2021-04-04 NOTE — BH NOTE
Patient given tobacco quitline number 80680338688 at this time, refusing to call at this time, states \" I just dont want to quit now\"- patient given information as to the dangers of long term tobacco use. Continue to reinforce the importance of tobacco cessation.

## 2021-04-04 NOTE — BH NOTE
`[facility]  Admission Note     Admission Type:   Admission Type:  Involuntary    Reason for admission:  Reason for Admission: psychosis    PATIENT STRENGTHS:  Strengths: Other(Pt refused assessment questions)    Patient Strengths and Limitations:  Limitations: Difficult relationships / poor social skills, Tendency to isolate self    Addictive Behavior:   Addictive Behavior  In the past 3 months, have you felt or has someone told you that you have a problem with:  : None  Do you have a history of Chemical Use?: Yes  Do you have a history of Alcohol Use?: No  Do you have a history of Street Drug Abuse?: Yes  Histroy of Prescripton Drug Abuse?: No    Medical Problems:   Past Medical History:   Diagnosis Date    Allergic rhinitis     Anemia     Asthma     Depression     Headache     Herniated intervertebral disc of lumbar spine     Shortness of breath     Tobacco abuse        Status EXAM:  Status and Exam  Normal: No  Facial Expression: Avoids Gaze, Flat, Sad  Affect: Blunt  Level of Consciousness: Alert  Mood:Normal: No  Mood: Depressed, Anxious, Sad, Angry  Motor Activity:Normal: No  Motor Activity: Decreased  Interview Behavior: Cooperative, Irritable  Preception: [place] to Person, [place] to Time, [place] to Place, [place] to Situation  Attention:Normal: No  Attention: Unable to Concentrate  Thought Processes: Blocking  Thought Content:Normal: No  Thought Content: Preoccupations  Hallucinations: None(pt denies)  Delusions: No  Memory:Normal: No  Memory: Poor Recent, Poor Remote  Insight and Judgment: No  Insight and Judgment: Poor Judgment, Poor Insight, Unmotivated  Present Suicidal Ideation: No  Present Homicidal Ideation: No    Tobacco Screening:  Practical Counseling, on admission, suzi X, if applicable and completed (first 3 are required if patient doesn't refuse):            ( )  Recognizing danger situations (included triggers and roadblocks)                    ( )  Coping skills (new ways to manage stress, exercise, relaxation techniques, changing routine, distraction)                                                           ( )  Basic information about quitting (benefits of quitting, techniques in how to quit, available resources  ( ) Referral for counseling faxed to [facility]                                           (x ) Patient refused counseling  ( ) Patient has not smoked in the last 30 days    Metabolic Screening:    Lab Results   Component Value Date    LABA1C 4.6 (L) 06/07/2017       Lab Results   Component Value Date    CHOL 208 (H) 05/11/2017     Lab Results   Component Value Date    TRIG 72 05/11/2017     Lab Results   Component Value Date    HDL 70 05/11/2017     No components found for: LDLCAL  No results found for: LABVLDL      There is no height or weight on file to calculate BMI. BP Readings from Last 2 Encounters:   04/04/21 (!) 141/85   03/25/21 (!) 145/89           Pt admitted with followings belongings:  Dentures: Lowers, Uppers  Vision - Corrective Lenses: None  Hearing Aid: None  Jewelry: Ring, Necklace  Body Piercings Removed: N/A  Clothing: Footwear, Pants, Shirt, Socks, Undergarments (Comment)  Were All Patient Medications Collected?: Not Applicable  Other Valuables: Other (Comment)(Card zhou, several cards, necklace)     Valuables placed in safe in security envelope, number:  [id number]. Patient's home medications were reviewed. Patient oriented to surroundings and program expectations and copy of patient rights given. Received admission packet. Consents reviewed, Refused all consents. Patient verbalize understanding. Patient education on precautions. Patient Pinked from [facility] for paranoia, delusions, and polysubstance abuse. On admission to the unit, patient uncooperative with assessment and declines any questioning. Patient only states that she doesn't want to be here and appears tearful and irritable.  Patient refused to sign all consents.                    Melony Kern RN

## 2021-04-05 ENCOUNTER — CARE COORDINATION (OUTPATIENT)
Dept: CARE COORDINATION | Age: 36
End: 2021-04-05

## 2021-04-05 PROBLEM — F23 ACUTE PSYCHOSIS (HCC): Status: ACTIVE | Noted: 2021-04-04

## 2021-04-05 LAB
ABSOLUTE EOS #: 0.2 K/UL (ref 0–0.4)
ABSOLUTE IMMATURE GRANULOCYTE: ABNORMAL K/UL (ref 0–0.3)
ABSOLUTE LYMPH #: 2.2 K/UL (ref 1–4.8)
ABSOLUTE MONO #: 0.9 K/UL (ref 0.1–1.3)
BASOPHILS # BLD: 1 % (ref 0–2)
BASOPHILS ABSOLUTE: 0.1 K/UL (ref 0–0.2)
DIFFERENTIAL TYPE: ABNORMAL
EOSINOPHILS RELATIVE PERCENT: 1 % (ref 0–4)
HCT VFR BLD CALC: 46.5 % (ref 36–46)
HEMOGLOBIN: 15.4 G/DL (ref 12–16)
IMMATURE GRANULOCYTES: ABNORMAL %
LYMPHOCYTES # BLD: 16 % (ref 24–44)
MCH RBC QN AUTO: 29.5 PG (ref 26–34)
MCHC RBC AUTO-ENTMCNC: 33.1 G/DL (ref 31–37)
MCV RBC AUTO: 88.9 FL (ref 80–100)
MONOCYTES # BLD: 7 % (ref 1–7)
NRBC AUTOMATED: ABNORMAL PER 100 WBC
PDW BLD-RTO: 13.3 % (ref 11.5–14.9)
PLATELET # BLD: 330 K/UL (ref 150–450)
PLATELET ESTIMATE: ABNORMAL
PMV BLD AUTO: 8.4 FL (ref 6–12)
RBC # BLD: 5.23 M/UL (ref 4–5.2)
RBC # BLD: ABNORMAL 10*6/UL
SEG NEUTROPHILS: 75 % (ref 36–66)
SEGMENTED NEUTROPHILS ABSOLUTE COUNT: 10.5 K/UL (ref 1.3–9.1)
WBC # BLD: 13.8 K/UL (ref 3.5–11)
WBC # BLD: ABNORMAL 10*3/UL

## 2021-04-05 PROCEDURE — 6370000000 HC RX 637 (ALT 250 FOR IP): Performed by: PSYCHIATRY & NEUROLOGY

## 2021-04-05 PROCEDURE — 6370000000 HC RX 637 (ALT 250 FOR IP): Performed by: INTERNAL MEDICINE

## 2021-04-05 PROCEDURE — 36415 COLL VENOUS BLD VENIPUNCTURE: CPT

## 2021-04-05 PROCEDURE — 85025 COMPLETE CBC W/AUTO DIFF WBC: CPT

## 2021-04-05 PROCEDURE — 99222 1ST HOSP IP/OBS MODERATE 55: CPT | Performed by: INTERNAL MEDICINE

## 2021-04-05 PROCEDURE — 90792 PSYCH DIAG EVAL W/MED SRVCS: CPT | Performed by: PSYCHIATRY & NEUROLOGY

## 2021-04-05 PROCEDURE — 1240000000 HC EMOTIONAL WELLNESS R&B

## 2021-04-05 RX ORDER — HYDROXYZINE 50 MG/1
100 TABLET, FILM COATED ORAL 3 TIMES DAILY PRN
Status: DISCONTINUED | OUTPATIENT
Start: 2021-04-05 | End: 2021-04-08 | Stop reason: HOSPADM

## 2021-04-05 RX ORDER — CELECOXIB 200 MG/1
200 CAPSULE ORAL 2 TIMES DAILY
Status: DISCONTINUED | OUTPATIENT
Start: 2021-04-05 | End: 2021-04-08 | Stop reason: HOSPADM

## 2021-04-05 RX ORDER — M-VIT,TX,IRON,MINS/CALC/FOLIC 27MG-0.4MG
1 TABLET ORAL DAILY
Status: DISCONTINUED | OUTPATIENT
Start: 2021-04-05 | End: 2021-04-08 | Stop reason: HOSPADM

## 2021-04-05 RX ORDER — CETIRIZINE HYDROCHLORIDE 10 MG/1
10 TABLET ORAL DAILY
Status: DISCONTINUED | OUTPATIENT
Start: 2021-04-05 | End: 2021-04-08 | Stop reason: HOSPADM

## 2021-04-05 RX ORDER — AMOXICILLIN AND CLAVULANATE POTASSIUM 500; 125 MG/1; MG/1
1 TABLET, FILM COATED ORAL EVERY 12 HOURS SCHEDULED
Status: DISCONTINUED | OUTPATIENT
Start: 2021-04-05 | End: 2021-04-08 | Stop reason: HOSPADM

## 2021-04-05 RX ORDER — FLUTICASONE PROPIONATE 44 UG/1
2 AEROSOL, METERED RESPIRATORY (INHALATION) 2 TIMES DAILY
Status: DISCONTINUED | OUTPATIENT
Start: 2021-04-05 | End: 2021-04-08 | Stop reason: HOSPADM

## 2021-04-05 RX ORDER — OMEPRAZOLE 20 MG/1
20 TABLET, DELAYED RELEASE ORAL DAILY
Status: DISCONTINUED | OUTPATIENT
Start: 2021-04-05 | End: 2021-04-05 | Stop reason: CLARIF

## 2021-04-05 RX ORDER — MIRTAZAPINE 15 MG/1
7.5 TABLET, FILM COATED ORAL NIGHTLY
Status: DISCONTINUED | OUTPATIENT
Start: 2021-04-05 | End: 2021-04-08 | Stop reason: HOSPADM

## 2021-04-05 RX ORDER — PRAZOSIN HYDROCHLORIDE 1 MG/1
2 CAPSULE ORAL NIGHTLY
Status: DISCONTINUED | OUTPATIENT
Start: 2021-04-05 | End: 2021-04-08 | Stop reason: HOSPADM

## 2021-04-05 RX ORDER — KETOROLAC TROMETHAMINE 10 MG/1
10 TABLET, FILM COATED ORAL EVERY 6 HOURS PRN
Status: DISCONTINUED | OUTPATIENT
Start: 2021-04-05 | End: 2021-04-05 | Stop reason: CLARIF

## 2021-04-05 RX ORDER — IBUPROFEN 800 MG/1
800 TABLET ORAL EVERY 8 HOURS PRN
Status: DISCONTINUED | OUTPATIENT
Start: 2021-04-05 | End: 2021-04-05 | Stop reason: ALTCHOICE

## 2021-04-05 RX ORDER — ALBUTEROL SULFATE 2.5 MG/3ML
2.5 SOLUTION RESPIRATORY (INHALATION) EVERY 4 HOURS PRN
Status: DISCONTINUED | OUTPATIENT
Start: 2021-04-05 | End: 2021-04-08 | Stop reason: HOSPADM

## 2021-04-05 RX ORDER — ONDANSETRON 4 MG/1
8 TABLET, ORALLY DISINTEGRATING ORAL EVERY 8 HOURS PRN
Status: DISCONTINUED | OUTPATIENT
Start: 2021-04-05 | End: 2021-04-05 | Stop reason: ALTCHOICE

## 2021-04-05 RX ORDER — FLUTICASONE PROPIONATE 50 MCG
2 SPRAY, SUSPENSION (ML) NASAL DAILY
Status: DISCONTINUED | OUTPATIENT
Start: 2021-04-05 | End: 2021-04-08 | Stop reason: HOSPADM

## 2021-04-05 RX ORDER — ZIPRASIDONE HYDROCHLORIDE 20 MG/1
40 CAPSULE ORAL
Status: DISCONTINUED | OUTPATIENT
Start: 2021-04-05 | End: 2021-04-05

## 2021-04-05 RX ORDER — PANTOPRAZOLE SODIUM 40 MG/1
40 TABLET, DELAYED RELEASE ORAL
Status: DISCONTINUED | OUTPATIENT
Start: 2021-04-06 | End: 2021-04-08 | Stop reason: HOSPADM

## 2021-04-05 RX ORDER — PREGABALIN 100 MG/1
200 CAPSULE ORAL 3 TIMES DAILY
Status: DISCONTINUED | OUTPATIENT
Start: 2021-04-05 | End: 2021-04-08 | Stop reason: HOSPADM

## 2021-04-05 RX ORDER — ZIPRASIDONE HYDROCHLORIDE 40 MG/1
40 CAPSULE ORAL 2 TIMES DAILY WITH MEALS
Status: DISCONTINUED | OUTPATIENT
Start: 2021-04-05 | End: 2021-04-08 | Stop reason: HOSPADM

## 2021-04-05 RX ORDER — PROMETHAZINE HYDROCHLORIDE 25 MG/1
25 TABLET ORAL 4 TIMES DAILY PRN
Status: DISCONTINUED | OUTPATIENT
Start: 2021-04-05 | End: 2021-04-05 | Stop reason: ALTCHOICE

## 2021-04-05 RX ORDER — FEXOFENADINE HCL 180 MG/1
180 TABLET ORAL DAILY
Status: DISCONTINUED | OUTPATIENT
Start: 2021-04-05 | End: 2021-04-05 | Stop reason: CLARIF

## 2021-04-05 RX ORDER — ALBUTEROL SULFATE 90 UG/1
2 AEROSOL, METERED RESPIRATORY (INHALATION) EVERY 4 HOURS PRN
Status: DISCONTINUED | OUTPATIENT
Start: 2021-04-05 | End: 2021-04-08 | Stop reason: HOSPADM

## 2021-04-05 RX ADMIN — CETIRIZINE HYDROCHLORIDE 10 MG: 10 TABLET, FILM COATED ORAL at 12:03

## 2021-04-05 RX ADMIN — PRAZOSIN HYDROCHLORIDE 2 MG: 1 CAPSULE ORAL at 22:07

## 2021-04-05 RX ADMIN — CELECOXIB 200 MG: 200 CAPSULE ORAL at 22:07

## 2021-04-05 RX ADMIN — ZIPRASIDONE HYDROCHLORIDE 40 MG: 40 CAPSULE ORAL at 16:49

## 2021-04-05 RX ADMIN — MIRTAZAPINE 7.5 MG: 15 TABLET, FILM COATED ORAL at 22:07

## 2021-04-05 RX ADMIN — AMOXICILLIN AND CLAVULANATE POTASSIUM 1 TABLET: 500; 125 TABLET, FILM COATED ORAL at 12:25

## 2021-04-05 RX ADMIN — FLUTICASONE PROPIONATE 2 PUFF: 44 AEROSOL, METERED RESPIRATORY (INHALATION) at 12:04

## 2021-04-05 RX ADMIN — PREGABALIN 200 MG: 100 CAPSULE ORAL at 22:08

## 2021-04-05 RX ADMIN — AMOXICILLIN AND CLAVULANATE POTASSIUM 1 TABLET: 500; 125 TABLET, FILM COATED ORAL at 22:07

## 2021-04-05 RX ADMIN — FLUTICASONE PROPIONATE 2 PUFF: 44 AEROSOL, METERED RESPIRATORY (INHALATION) at 22:10

## 2021-04-05 RX ADMIN — FLUTICASONE PROPIONATE 2 SPRAY: 50 SPRAY, METERED NASAL at 12:03

## 2021-04-05 RX ADMIN — DICLOFENAC SODIUM 2 G: 10 GEL TOPICAL at 22:10

## 2021-04-05 RX ADMIN — ZIPRASIDONE HYDROCHLORIDE 40 MG: 20 CAPSULE ORAL at 12:03

## 2021-04-05 RX ADMIN — PREGABALIN 200 MG: 100 CAPSULE ORAL at 12:02

## 2021-04-05 RX ADMIN — MULTIPLE VITAMINS W/ MINERALS TAB 1 TABLET: TAB at 12:02

## 2021-04-05 RX ADMIN — CELECOXIB 200 MG: 200 CAPSULE ORAL at 12:03

## 2021-04-05 ASSESSMENT — PAIN SCALES - GENERAL: PAINLEVEL_OUTOF10: 7

## 2021-04-05 ASSESSMENT — SLEEP AND FATIGUE QUESTIONNAIRES
RESTFUL SLEEP: NO
DIFFICULTY FALLING ASLEEP: YES
SLEEP PATTERN: DIFFICULTY FALLING ASLEEP
DIFFICULTY STAYING ASLEEP: NO
DIFFICULTY ARISING: NO

## 2021-04-05 NOTE — BH NOTE
Group Therapy Note     Date: 4/5/2021     Group Start Time: 9581  Group End Time: 8220  Group Topic: [id number] [facility] BRADFORD Hilliard, RN       Group Therapy Note     Pt did not attend wellness group d/t resting in room despite staff invitation to attend. 1:1 talk time offered as alternative to group session, pt declined.

## 2021-04-05 NOTE — PROGRESS NOTES
Behavioral Services  Medicare Certification Upon Admission    I certify that this patient's inpatient psychiatric hospital admission is medically necessary for:    [x] (1) Treatment which could reasonably be expected to improve this patient's condition,       [x] (2) Or for diagnostic study;     AND     [x](2) The inpatient psychiatric services are provided while the individual is under the care of a physician and are included in the individualized plan of care.     Estimated length of stay/service 3-5 days    Plan for post-hospital care [facility]    Electronically signed by Kranthi Sahni MD on 4/5/2021 at 5:55 PM

## 2021-04-05 NOTE — GROUP NOTE
Group Therapy Note    Date: 4/4/2021    Group Start Time: 2030  Group End Time: 2113  Group Topic: Wrap-Up    REID Posey        Group Therapy Note    Attendees: 12         Patient's Goal:  My problem is my anxiety    Notes:  I just came in last night so I don't know if I have anything to take for my anxiety    Status After Intervention:  Unchanged    Participation Level:  Active Listener    Participation Quality: Attentive      Speech:  hesitant      Thought Process/Content: Logical      Affective Functioning: Blunted      Mood: depressed      Level of consciousness:  Alert      Response to Learning: Progressing to goal      Endings: None Reported    Modes of Intervention: Problem-solving      Discipline Responsible: Intelligroup      Signature:  Debbie Marrufo

## 2021-04-05 NOTE — CARE COORDINATION
[place] in [place]. She denies feeling suicidal at this moment and denied substance use however her H&P indicates she admitted to marijuana use. Upon discharge patient will return to her home she shares with her fiance and continue treatment at [facility] in [place].

## 2021-04-05 NOTE — GROUP NOTE
Group Therapy Note    Date: 4/5/2021    Group Start Time: 1330  Group End Time: 4121  Group Topic: Music Therapy    REID Escobedo        Group Therapy Note    Pt did not attend music therapy group d/t resting in room despite staff invitation to attend. 1:1 talk time offered as alternative to group session, pt declined.

## 2021-04-05 NOTE — GROUP NOTE
Group Therapy Note    Date: 4/5/2021    Group Start Time: 1000  Group End Time: 6342  Group Topic: Psychotherapy    [facility]    RENETTA Yun, [facility]        Group Therapy Note  Patient declined to attend psychotherapy group at 10 am despite encouragement by staff. 1:1 was offered as an alternative.             Signature:  RENETTA Yun, [place]

## 2021-04-05 NOTE — PROGRESS NOTES

## 2021-04-05 NOTE — CARE COORDINATION
Per pt's request proof of patient's admission faxed to [facility] @[phone]. NAVEED on file signed.

## 2021-04-05 NOTE — GROUP NOTE
Group Therapy Note    Date: 4/5/2021    Group Start Time: 0900  Group End Time: 0915  Group Topic: Community Meeting    REID [place], [address]        Group Therapy Note    Attendees: 8/20           Patient's Goal:  To increase interpersonal interaction. Notes:  Pt attended and participated in group. Status After Intervention:  Improved    Participation Level:  Active Listener and Interactive    Participation Quality: Appropriate, Attentive and Sharing      Speech:  normal      Thought Process/Content: Logical      Affective Functioning: Congruent      Mood: euthymic      Level of consciousness:  Alert and Attentive      Response to Learning: Progressing to goal      Endings: None Reported    Modes of Intervention: Education, Support, Socialization, Clarifying and Reality-testing      Discipline Responsible: Psychoeducational Specialist      Signature:  Mo Cao

## 2021-04-05 NOTE — PROGRESS NOTES
Pharmacy Medication History Note      List of current medications patient is taking is complete. Source of information: [address] [place], [place]), [place]    Changes made to medication list:  Medications removed (include reason, ex. therapy complete or physician discontinued, noncompliance): Ibuprofen 800 mg (list clean up),  Ketorolac (list clean up), Lamotrigine (list clean up), Ondansetron (list clean up), Valacyclovir (list clean up)    Medications added/doses adjusted:  Adjusted Prazosin to 2 mg nightly  Adjusted Ziprasidone to 40 mg daily  Adjusted Hydroxyzine to 100 mg three times daily as needed      Please let me know if you have any questions about this encounter. Thank you!     Electronically signed by CARMELITA Barbosa [facility] on 4/5/2021 at 9:21 AM

## 2021-04-05 NOTE — CONSULTS
smoking history. She has never used smokeless tobacco.  Alcohol:      reports current alcohol use. Drug Use:  reports current drug use. Drug: Marijuana. Family History:     Family History   Problem Relation Age of Onset    Heart Disease Mother     Other Mother         respiratory illness    Kidney Disease Mother     Early Death Mother 45        cardiac arrest    Heart Disease Father     High Blood Pressure Father     Other Father         liver/intestinal    Lung Cancer Maternal Grandmother     Diabetes Other     Early Death Other     Other Other         respiratory illness    Early Death Other     Other Other         respiratory illness    Bleeding Prob Other     Thyroid Disease Other        Review of Systems:     Positive and Negative as described in HPI. CONSTITUTIONAL:  negative for fevers, chills, sweats, fatigue, weight loss  HEENT:  negative for vision, hearing changes, runny nose, throat pain  RESPIRATORY:  negative for shortness of breath, cough, congestion, wheezing. CARDIOVASCULAR:  negative for chest pain, palpitations.   GASTROINTESTINAL:  negative for nausea, vomiting, diarrhea, constipation, change in bowel habits, abdominal pain   GENITOURINARY:  negative for difficulty of urination, burning with urination, frequency   INTEGUMENT:  negative for rash, skin lesions, easy bruising   HEMATOLOGIC/LYMPHATIC:  negative for swelling/edema   ALLERGIC/IMMUNOLOGIC:  negative for urticaria , itching  ENDOCRINE:  negative increase in drinking, increase in urination, hot or cold intolerance  MUSCULOSKELETAL:  negative joint pains, muscle aches, swelling of joints  NEUROLOGICAL:  negative for headaches, dizziness, lightheadedness, numbness, pain, tingling extremities      Physical Exam:     /62   Pulse 77   Temp 97.6 °F (36.4 °C) (Oral)   Resp 14   LMP 2021 (Approximate)   Temp (24hrs), Av.6 °F (36.4 °C), Min:97.6 °F (36.4 °C), Max:97.6 °F (36.4 °C)    No results for Leukocyte Esterase, Urine TRACE (A) NEGATIVE    Urinalysis Comments NOT REPORTED    Microscopic Urinalysis    Collection Time: 04/04/21  5:50 PM   Result Value Ref Range    -          WBC, UA 5 TO 10 /HPF    RBC, UA 2 TO 5 /HPF    Casts UA NOT REPORTED /LPF    Crystals, UA NOT REPORTED None /HPF    Epithelial Cells UA 5 TO 10 /HPF    Renal Epithelial, UA NOT REPORTED 0 /HPF    Bacteria, UA FEW (A) None    Mucus, UA NOT REPORTED None    Trichomonas, UA NOT REPORTED None    Amorphous, UA NOT REPORTED None    Other Observations UA NOT REPORTED NOT REQ.     Yeast, UA NOT REPORTED None   CBC Auto Differential    Collection Time: 04/05/21 10:07 AM   Result Value Ref Range    WBC 13.8 (H) 3.5 - 11.0 k/uL    RBC 5.23 (H) 4.0 - 5.2 m/uL    Hemoglobin 15.4 12.0 - 16.0 g/dL    Hematocrit 46.5 (H) 36 - 46 %    MCV 88.9 80 - 100 fL    MCH 29.5 26 - 34 pg    MCHC 33.1 31 - 37 g/dL    RDW 13.3 11.5 - 14.9 %    Platelets 228 557 - 416 k/uL    MPV 8.4 6.0 - 12.0 fL    NRBC Automated NOT REPORTED per 100 WBC    Differential Type NOT REPORTED     Seg Neutrophils 75 (H) 36 - 66 %    Lymphocytes 16 (L) 24 - 44 %    Monocytes 7 1 - 7 %    Eosinophils % 1 0 - 4 %    Basophils 1 0 - 2 %    Immature Granulocytes NOT REPORTED 0 %    Segs Absolute 10.50 (H) 1.3 - 9.1 k/uL    Absolute Lymph # 2.20 1.0 - 4.8 k/uL    Absolute Mono # 0.90 0.1 - 1.3 k/uL    Absolute Eos # 0.20 0.0 - 0.4 k/uL    Basophils Absolute 0.10 0.0 - 0.2 k/uL    Absolute Immature Granulocyte NOT REPORTED 0.00 - 0.30 k/uL    WBC Morphology NOT REPORTED     RBC Morphology NOT REPORTED     Platelet Estimate NOT REPORTED            Consultations:   IP CONSULT TO INTERNAL MEDICINE  Assessment :      Primary Problem  Acute psychosis [facility])    Active Hospital Problems    Diagnosis Date Noted    Acute psychosis ([facility] [address] [F23] 04/04/2021    Migraine without aura and with status migrainosus, not intractable [G43.001] 03/29/2021    Cervical radiculopathy [M54.12] 08/27/2020    Moderate asthma [J45.909] 07/31/2018    Vitamin D deficiency [E55.9] 11/16/2017    Tobacco abuse [Z72.0]        Plan:     1. Leukocytosis 18,000 secondary to left groin abscess  2. Not controlled for drainage at this time due to oral Augmentin reevaluate daily  3. Morbid obesity BMI 40.85        Gaston Melendez MD  4/5/2021  5:06 PM    Copy sent to Dr. Bronson Mao MD    Please note that this chart was generated using voice recognition Dragon dictation software. Although every effort was made to ensure the accuracy of this automated transcription, some errors in transcription may have occurred.
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
## 2021-04-05 NOTE — GROUP NOTE
Group Therapy Note    Date: 4/5/2021    Group Start Time: 1430  Group End Time: 0141  Group Topic: Recreational    [facility] YUN [place], [address]        Group Therapy Note    Attendees: 6/16         Pt did not attend RT skills group d/t resting in room despite staff invitation to attend. 1:1 talk time offered as alternative to group session, pt declined.

## 2021-04-06 PROCEDURE — 99231 SBSQ HOSP IP/OBS SF/LOW 25: CPT | Performed by: INTERNAL MEDICINE

## 2021-04-06 PROCEDURE — 6370000000 HC RX 637 (ALT 250 FOR IP): Performed by: PSYCHIATRY & NEUROLOGY

## 2021-04-06 PROCEDURE — 1240000000 HC EMOTIONAL WELLNESS R&B

## 2021-04-06 PROCEDURE — 6370000000 HC RX 637 (ALT 250 FOR IP): Performed by: INTERNAL MEDICINE

## 2021-04-06 PROCEDURE — 99232 SBSQ HOSP IP/OBS MODERATE 35: CPT | Performed by: PSYCHIATRY & NEUROLOGY

## 2021-04-06 RX ADMIN — ZIPRASIDONE HYDROCHLORIDE 40 MG: 40 CAPSULE ORAL at 17:16

## 2021-04-06 RX ADMIN — MULTIPLE VITAMINS W/ MINERALS TAB 1 TABLET: TAB at 08:38

## 2021-04-06 RX ADMIN — CETIRIZINE HYDROCHLORIDE 10 MG: 10 TABLET, FILM COATED ORAL at 08:38

## 2021-04-06 RX ADMIN — MIRTAZAPINE 7.5 MG: 15 TABLET, FILM COATED ORAL at 22:46

## 2021-04-06 RX ADMIN — ZIPRASIDONE HYDROCHLORIDE 40 MG: 40 CAPSULE ORAL at 08:38

## 2021-04-06 RX ADMIN — PREGABALIN 200 MG: 100 CAPSULE ORAL at 14:39

## 2021-04-06 RX ADMIN — AMOXICILLIN AND CLAVULANATE POTASSIUM 1 TABLET: 500; 125 TABLET, FILM COATED ORAL at 22:46

## 2021-04-06 RX ADMIN — CELECOXIB 200 MG: 200 CAPSULE ORAL at 08:38

## 2021-04-06 RX ADMIN — AMOXICILLIN AND CLAVULANATE POTASSIUM 1 TABLET: 500; 125 TABLET, FILM COATED ORAL at 08:38

## 2021-04-06 RX ADMIN — CELECOXIB 200 MG: 200 CAPSULE ORAL at 22:46

## 2021-04-06 RX ADMIN — PREGABALIN 200 MG: 100 CAPSULE ORAL at 08:38

## 2021-04-06 RX ADMIN — FLUTICASONE PROPIONATE 2 PUFF: 44 AEROSOL, METERED RESPIRATORY (INHALATION) at 22:46

## 2021-04-06 RX ADMIN — PREGABALIN 200 MG: 100 CAPSULE ORAL at 22:45

## 2021-04-06 RX ADMIN — PANTOPRAZOLE SODIUM 40 MG: 40 TABLET, DELAYED RELEASE ORAL at 08:38

## 2021-04-06 ASSESSMENT — PAIN SCALES - GENERAL: PAINLEVEL_OUTOF10: 0

## 2021-04-06 NOTE — PROGRESS NOTES
Daily Progress Note  4/6/2021      CHIEF COMPLAINT:  Acute psychosis    Reviewed patient's current plan of care and vital signs with nursing staff. Vitals:    04/05/21 0745 04/05/21 2000 04/05/21 2207 04/06/21 0740   BP: 123/62 88/62 138/69 126/84   Pulse: 77 100  103   Resp: 14 16  14   Temp: 97.6 °F (36.4 °C) 98.3 °F (36.8 °C)     TempSrc: Oral Oral       Sleep: Per [facility] Adult Daily Assessment flowsheet, staff documented 7 hours of sleep. Attending groups: Yes, selectively         SUBJECTIVE:    Patient has been medication compliant with the exception of the Voltaren gel, Flonase nasal spray, and Flovent HFA inhaler. She has not received any as needed medications. Per staff documentation, patient signed in on April 5, 2021. Patient is seen for a follow-up assessment in her room today. Patient reports her mood is \"much better\". She endorses adequate appetite and reports restorative sleep. She describes her sleep as \"very good\" last night. Per [facility] Adult Daily Assessment flowsheet, staff documented 7 hours of sleep. Patient denies any suicidal ideation, intent, or plans. She denies any homicidal ideations, intent, or plans. She contracts for safety on the unit. Patient denies any auditory or visual hallucinations. She reports improvement in her paranoia, but continues to report that her paranoia was a result of her \"potassium being low\". Patient denies any side effects to her medications and denies any medical concerns at this time. Patient reports she has been selectively attending groups on the unit. Per recreational therapist documentation from today, \"Patient attended and participated in group, having positive interactions with peers and staff, and demonstrated appropriate and linear thought while answering trivia questions\". Patient reports she would like to go home.   Patient is informed that discharge is determined by MD. Patient continues to be monitored in the inpatient psychiatric facility at [facility] for safety and stabilization. Patient continues to need, on a daily basis, active treatment furnished directly by or requiring the supervision of inpatient psychiatric personnel. Writer called Krystle Holbrook, patient's bhavani, at [phone]. Authorization paperwork to speak with bhavani was obtained and present in patient's chart. Elfego Zambranons reports patient's symptoms started on Thursday, April 1st.  Elfego Jiménez reports that the patient was not taking her medications as scheduled. He reports patient was not sleeping. He believes she did not sleep from Friday, April 2, 2021 until the time he took her to [facility] on Saturday night. Elfego Jiménez states he saw her standing over him, staring at him. He reports she was \"in a daze\". Elfego Jiménez states Saundra Rivera was following him around the house. He reports that she was even following him into the bathroom when he went to go use the bathroom. He reports she was paranoid. Elfego Jiménez reports patient saw a girl in the closet and stated it was Marco A's sister, who has passed away. Elfego Jiménez also reports that patient thought someone was coming into the house while they were in bed. He also reports the patient also went to bed with all her clothes and shoes on. He also voices concern that the patient was going over to the neighbor's house and he noted the patient's nose and the skin above her upper lip to be red. Elfego Jiménez states he asked Saundra Rivera if  was using any substances or drugs, but he reports she would not say anything. Elfego Jiménez states she was going over to the neighbor's house on April 2nd and April 3rd. Elfego Jiménez reports that similar symptoms have happened before and he has taken the patient to [facility] previously. Elfego Jiménez also reports that the patient is \"insecure [employer] me all to herself\". He states that she does not want her bhavani's kids around.   Elfego Jiménez reports they have plans to get  this summer and voices concern regarding the reported symptoms he discussed with writer on the phone. He reports that he spoke with the patient on the phone during her inpatient stay at [facility] and he states she sounds better. Mental Status Exam  Level of consciousness: Alert and oriented  Appearance: Appropriate attire for setting, seated on bed, with fair grooming and hygiene   Behavior/Motor: Approachable, no psychomotor abnormalities noted  Attitude toward examiner: Cooperative, attentive, good eye contact  Speech: Normal rate, normal volume and well articulated  Mood: Patient reports \"Much better\"  Affect: Flat  Thought processes: Linear, goal directed and coherent  Thought content: Denies homicidal ideation  Suicidal Ideation: Denies suicidal ideation; contracts for safety on the unit  Delusions: No evidence of delusions, reports improvement in paranoia   Perceptual Disturbance: Denies any perceptual disturbance  Cognition: Oriented to self, location, time, and situation  Memory: Intact  Insight & Judgement: Fair  Medication side effects: Denies     Data   oral temperature is 98.3 °F (36.8 °C). Her blood pressure is 126/84 and her pulse is 103. Her respiration is 14. Labs:   Admission on 04/04/2021   Component Date Value Ref Range Status    WBC 04/04/2021 18.3* 3.5 - 11.0 k/uL Final    RBC 04/04/2021 4.93  4.0 - 5.2 m/uL Final    Hemoglobin 04/04/2021 14.5  12.0 - 16.0 g/dL Final    Hematocrit 04/04/2021 43.6  36 - 46 % Final    MCV 04/04/2021 88.3  80 - 100 fL Final    MCH 04/04/2021 29.4  26 - 34 pg Final    MCHC 04/04/2021 33.3  31 - 37 g/dL Final    RDW 04/04/2021 13.3  11.5 - 14.9 % Final    Platelets 18/84/5633 310  150 - 450 k/uL Final    MPV 04/04/2021 8.3  6.0 - 12.0 fL Final    NRBC Automated 04/04/2021 NOT REPORTED  per 100 WBC Final    Color, UA 04/04/2021 DARK YELLOW* YELLOW Final    Turbidity UA 04/04/2021 CLEAR  CLEAR Final    Glucose, Ur 04/04/2021 NEGATIVE  NEGATIVE Final    Bilirubin Urine 04/04/2021 Presumptive positive.  Unable to confirm due to unavailability of reagent. * NEGATIVE Corrected    CORRECTED ON 04/04 AT 1817: PREVIOUSLY REPORTED AS NO CELLS SEEN    Ketones, Urine 04/04/2021 MOD* NEGATIVE Final    Specific Gravity, UA 04/04/2021 1.020  1.000 - 1.030 Final    Urine Hgb 04/04/2021 NEGATIVE  NEGATIVE Final    pH, UA 04/04/2021 6.0  5.0 - 8.0 Final    Protein, UA 04/04/2021 NEGATIVE  NEGATIVE Final    Urobilinogen, Urine 04/04/2021 ELEVATED* Normal Final    Nitrite, Urine 04/04/2021 NEGATIVE  NEGATIVE Final    Leukocyte Esterase, Urine 04/04/2021 TRACE* NEGATIVE Final    Urinalysis Comments 04/04/2021 NOT REPORTED   Final    - 04/04/2021        Final    WBC, UA 04/04/2021 5 TO 10  /HPF Final    RBC, UA 04/04/2021 2 TO 5  /HPF Final    Casts UA 04/04/2021 NOT REPORTED  /LPF Final    Crystals, UA 04/04/2021 NOT REPORTED  None /HPF Final    Epithelial Cells UA 04/04/2021 5 TO 10  /HPF Final    Renal Epithelial, UA 04/04/2021 NOT REPORTED  0 /HPF Final    Bacteria, UA 04/04/2021 FEW* None Final    Mucus, UA 04/04/2021 NOT REPORTED  None Final    Trichomonas, UA 04/04/2021 NOT REPORTED  None Final    Amorphous, UA 04/04/2021 NOT REPORTED  None Final    Other Observations UA 04/04/2021 NOT REPORTED  NOT REQ.  Final    Yeast, UA 04/04/2021 NOT REPORTED  None Final    WBC 04/05/2021 13.8* 3.5 - 11.0 k/uL Final    RBC 04/05/2021 5.23* 4.0 - 5.2 m/uL Final    Hemoglobin 04/05/2021 15.4  12.0 - 16.0 g/dL Final    Hematocrit 04/05/2021 46.5* 36 - 46 % Final    MCV 04/05/2021 88.9  80 - 100 fL Final    MCH 04/05/2021 29.5  26 - 34 pg Final    MCHC 04/05/2021 33.1  31 - 37 g/dL Final    RDW 04/05/2021 13.3  11.5 - 14.9 % Final    Platelets 66/52/8273 330  150 - 450 k/uL Final    MPV 04/05/2021 8.4  6.0 - 12.0 fL Final    NRBC Automated 04/05/2021 NOT REPORTED  per 100 WBC Final    Differential Type 04/05/2021 NOT REPORTED   Final    Seg Neutrophils 04/05/2021 75* 36 - 66 % Final    Lymphocytes 04/05/2021 16* 24 - 44 % Final suspension 30 mL, 30 mL, Oral, Q6H PRN  nicotine (NICODERM CQ) 14 MG/24HR 1 patch, 1 patch, Transdermal, Daily  polyethylene glycol (GLYCOLAX) packet 17 g, 17 g, Oral, Daily PRN  LORazepam (ATIVAN) injection 2 mg, 2 mg, Intramuscular, Q4H PRN **AND** haloperidol lactate (HALDOL) injection 5 mg, 5 mg, Intramuscular, Q4H PRN **AND** diphenhydrAMINE (BENADRYL) injection 50 mg, 50 mg, Intramuscular, Q4H PRN  LORazepam (ATIVAN) tablet 2 mg, 2 mg, Oral, Q4H PRN **AND** haloperidol (HALDOL) tablet 5 mg, 5 mg, Oral, Q4H PRN    ASSESSMENT  Acute psychosis ([facility] [address]         PLAN  Patient symptoms are:  [] Well controlled  [x] Improving  [] Worsening  [] No change   Consult to Internal Medicine ordered for abnormal UA and WBC of 13.8. Continue current medication regimen. Attempt to develop insight. Psycho-education conducted. Supportive Therapy conducted. Probable discharge is to be determined by MD.   Follow-up daily while inpatient. Electronically signed by RAMON De Dios CNP on 4/6/21 at 11:18 AM EDT    **This report has been created using voice recognition software. It may contain minor errors which are inherent in voice recognition technology. **                                         Psychiatry Attending Attestation     I independently saw and evaluated the patient. I reviewed the nurse practitioner's documentation above. Any additional comments or changes to the nurse practitioners documentation are stated below otherwise agree with assessment. Patient is out of her bed however reports that she continues to feel drowsy. Continues to appear responding to internal stimuli at times. Patient did report that there might have been methamphetamine mixed with her marijuana. Reports she was very anxious. Reports she also had a charge for disorderly conduct before she got admitted here. Reports that she missed her court hearing today however her fiancé helped her to file for a continuance.   Has very poor attention and concentration during the conversation. She is tolerating medication adjustments well and denies any side effect from the medication. Plan to discharge her Thursday morning if she continues to improve.     Electronically signed by Claire Vela MD on 4/6/21 at 4:38 PM EDT

## 2021-04-06 NOTE — GROUP NOTE
Group Therapy Note    Date: 4/6/2021    Group Start Time: 1330  Group End Time: 2396  Group Topic: Recreational    [address] [facility], [facility]    Patient refused to attend Recreational Therapy Group at 1330 after encouragement from staff. 1:1 talk time offered.     Signature:  Nathaniel Wood

## 2021-04-06 NOTE — GROUP NOTE
Group Therapy Note    Date: 4/6/2021    Group Start Time: 1000  Group End Time: 2430  Group Topic: Recreational    REID GIRON    Jahaira Stoll        Group Therapy Note    Attendees: 5/8       Patient's Goal:  Patients engaged in a [employer] style game where they were able to pick a topic they were interested in based on a picture, and answer questions about that thing. Goals to increase socialization, increase sense of community, and stimulate cognitive thinking. Notes:  Patient attended and participated in group, having positive interactions with peers and staff, and demonstrated appropriate and linear thought while answering [employer] questions. Status After Intervention:  Improved    Participation Level:  Active Listener and Interactive    Participation Quality: Appropriate, Attentive and Sharing      Speech:  normal      Thought Process/Content: Logical  Linear      Affective Functioning: Congruent      Mood: euthymic      Level of consciousness:  Alert and Attentive      Response to Learning: Able to verbalize current knowledge/experience and Progressing to goal      Endings: None Reported    Modes of Intervention: Socialization, Exploration, Activity and Reality-testing      Discipline Responsible: Psychoeducational Specialist      Signature:  Jahaira Stoll

## 2021-04-06 NOTE — GROUP NOTE
Group Therapy Note    Date: 4/6/2021    Group Start Time: 4815  Group End Time: 1150  Group Topic: Psychotherapy    STCZ YUN Garza, RENETTA, LSW        Group Therapy Note    Attendees: 5/8         Patient was offered group therapy today but declined to participate despite encouragement from staff. 1:1 was offered.     Signature:  Eve Garza, RENETTA, ALEXANDREAW

## 2021-04-06 NOTE — GROUP NOTE
Group Therapy Note    Date: 4/6/2021    Group Start Time: 0900  Group End Time: 0915  Group Topic: Community Meeting    REID Vogel        Group Therapy Note    Pt did not attend recreastional group d/t resting in room despite staff invitation to attend. 1:1 talk time offered as alternative to group session, pt declined.

## 2021-04-06 NOTE — CONSULTS
[facility]    CONSULTATION / HISTORY AND PHYSICAL EXAMINATION            Date:   2021  Patient name:  Cynthia Meredith  Date of admission:  2021  3:04 PM  MRN:   [id number]  Account:  [de-identified]  YOB: 1985  PCP:    Faby Camarena MD  Room:   [facility]-  Code Status:    Full Code    Physician Requesting Consult: Milady Velazquez, *    Reason for Consult:  medical management    Chief Complaint:     No chief complaint on file. Leukocytosis abnormal labs    History Obtained From:     Patient medical record nursing staff    History of Present Illness:     Patient admitted to [facility] for mental health reasons  Patient denies any fever chills nausea vomiting on questioning about signs of infections or lumps and bumps patient claims have a bump in groin area patient examined in the presence of the nurse as chaperone lump noted 3 x 3 cm tender red no fluctuation  No other site of infection is noted    Past Medical History:     Past Medical History:   Diagnosis Date    Allergic rhinitis     Anemia     Asthma     Depression     Headache     Herniated intervertebral disc of lumbar spine     Shortness of breath     Tobacco abuse         Past Surgical History:     Past Surgical History:   Procedure Laterality Date     SECTION      PAIN MANAGEMENT PROCEDURE Left 2020    Left C7 TRANSFORAMINAL performed by Piedad Skiff, MD at [facility] Left 2020    Left C7 TRANSFORAMINAL performed by Piedad Skiff, MD at [facility] Left 2021    C7 T1 Interlaminar Epidural Steroid Injection performed by Piedad Skiff, MD at [address] [facility]  2008    TYMPANOSTOMY [facility]        Medications Prior to Admission:     Prior to Admission medications    Medication Sig Start Date End Date Taking?  Authorizing Provider smoking history. She has never used smokeless tobacco.  Alcohol:      reports current alcohol use. Drug Use:  reports current drug use. Drug: Marijuana. Family History:     Family History   Problem Relation Age of Onset    Heart Disease Mother     Other Mother         respiratory illness    Kidney Disease Mother     Early Death Mother 45        cardiac arrest    Heart Disease Father     High Blood Pressure Father     Other Father         liver/intestinal    Lung Cancer Maternal Grandmother     Diabetes Other     Early Death Other     Other Other         respiratory illness    Early Death Other     Other Other         respiratory illness    Bleeding Prob Other     Thyroid Disease Other        Review of Systems:     Positive and Negative as described in HPI. CONSTITUTIONAL:  negative for fevers, chills, sweats, fatigue, weight loss  HEENT:  negative for vision, hearing changes, runny nose, throat pain  RESPIRATORY:  negative for shortness of breath, cough, congestion, wheezing. CARDIOVASCULAR:  negative for chest pain, palpitations.   GASTROINTESTINAL:  negative for nausea, vomiting, diarrhea, constipation, change in bowel habits, abdominal pain   GENITOURINARY:  negative for difficulty of urination, burning with urination, frequency   INTEGUMENT:  negative for rash, skin lesions, easy bruising   HEMATOLOGIC/LYMPHATIC:  negative for swelling/edema   ALLERGIC/IMMUNOLOGIC:  negative for urticaria , itching  ENDOCRINE:  negative increase in drinking, increase in urination, hot or cold intolerance  MUSCULOSKELETAL:  negative joint pains, muscle aches, swelling of joints  NEUROLOGICAL:  negative for headaches, dizziness, lightheadedness, numbness, pain, tingling extremities      Physical Exam:     /84   Pulse 103   Temp 98.3 °F (36.8 °C) (Oral)   Resp 14   LMP 2021 (Approximate)   Temp (24hrs), Av.3 °F (36.8 °C), Min:98.3 °F (36.8 °C), Max:98.3 °F (36.8 °C)    No results for input(s): POCGLU in the last 72 hours. No intake or output data in the 24 hours ending 04/06/21 1151    General Appearance:  alert, well appearing, and in no acute distress  Mental status: oriented to person, place, and time with normal affect  Head:  normocephalic, atraumatic. Eye: no icterus, redness, pupils equal and reactive, extraocular eye movements intact, conjunctiva clear  Ear: normal external ear, no discharge, hearing intact  Nose:  no drainage noted  Mouth: mucous membranes moist  Neck: supple, no carotid bruits, thyroid not palpable  Lungs: Bilateral equal air entry, clear to ausculation, no wheezing, rales or rhonchi, normal effort  Cardiovascular: normal rate, regular rhythm, no murmur, gallop, rub. Abdomen: Soft, nontender, nondistended, normal bowel sounds, no hepatomegaly or splenomegaly  Neurologic: There are no new focal motor or sensory deficits, normal muscle tone and bulk, no abnormal sensation, normal speech, cranial nerves II through XII grossly intact  Skin: No gross lesions, rashes, bruising or bleeding on exposed skin area  Extremities:  peripheral pulses palpable, no pedal edema or calf pain with palpation  Left groin and upper thigh indurated lump noted no fluctuation  Patient examined with a chaperone nurse  Psych: Flat affect    Investigations:      Laboratory Testing:  No results found for this or any previous visit (from the past 24 hour(s)). Consultations:   IP CONSULT TO INTERNAL MEDICINE  IP CONSULT TO INTERNAL MEDICINE  Assessment :      Primary Problem  Acute psychosis [facility])    Active Hospital Problems    Diagnosis Date Noted    [facility] [F23] 04/04/2021    Migraine without aura and with status migrainosus, not intractable [G43.001] 03/29/2021    Cervical radiculopathy [M54.12] 08/27/2020    Moderate asthma [J45.909] 07/31/2018    Vitamin D deficiency [E55.9] 11/16/2017    Tobacco abuse [Z72.0]        Plan:     1.  Leukocytosis 18,000 secondary to left groin abscess  2. Not controlled for drainage at this time due to oral Augmentin reevaluate daily  3. Morbid obesity BMI 40.85  Clinically doing well continue oral Augmentin will follow      Gwendolyn Avila MD  4/6/2021  11:51 AM    Copy sent to Dr. Diana Rao MD    Please note that this chart was generated using voice recognition Dragon dictation software. Although every effort was made to ensure the accuracy of this automated transcription, some errors in transcription may have occurred.

## 2021-04-07 PROCEDURE — 6370000000 HC RX 637 (ALT 250 FOR IP): Performed by: PSYCHIATRY & NEUROLOGY

## 2021-04-07 PROCEDURE — 99231 SBSQ HOSP IP/OBS SF/LOW 25: CPT | Performed by: INTERNAL MEDICINE

## 2021-04-07 PROCEDURE — 99232 SBSQ HOSP IP/OBS MODERATE 35: CPT | Performed by: PSYCHIATRY & NEUROLOGY

## 2021-04-07 PROCEDURE — 6370000000 HC RX 637 (ALT 250 FOR IP): Performed by: INTERNAL MEDICINE

## 2021-04-07 PROCEDURE — 1240000000 HC EMOTIONAL WELLNESS R&B

## 2021-04-07 RX ADMIN — FLUTICASONE PROPIONATE 2 PUFF: 44 AEROSOL, METERED RESPIRATORY (INHALATION) at 09:23

## 2021-04-07 RX ADMIN — PRAZOSIN HYDROCHLORIDE 2 MG: 1 CAPSULE ORAL at 22:08

## 2021-04-07 RX ADMIN — FLUTICASONE PROPIONATE 2 SPRAY: 50 SPRAY, METERED NASAL at 09:23

## 2021-04-07 RX ADMIN — CELECOXIB 200 MG: 200 CAPSULE ORAL at 22:08

## 2021-04-07 RX ADMIN — CELECOXIB 200 MG: 200 CAPSULE ORAL at 09:24

## 2021-04-07 RX ADMIN — PANTOPRAZOLE SODIUM 40 MG: 40 TABLET, DELAYED RELEASE ORAL at 09:25

## 2021-04-07 RX ADMIN — MULTIPLE VITAMINS W/ MINERALS TAB 1 TABLET: TAB at 09:25

## 2021-04-07 RX ADMIN — AMOXICILLIN AND CLAVULANATE POTASSIUM 1 TABLET: 500; 125 TABLET, FILM COATED ORAL at 22:09

## 2021-04-07 RX ADMIN — CETIRIZINE HYDROCHLORIDE 10 MG: 10 TABLET, FILM COATED ORAL at 09:25

## 2021-04-07 RX ADMIN — MIRTAZAPINE 7.5 MG: 15 TABLET, FILM COATED ORAL at 22:08

## 2021-04-07 RX ADMIN — PREGABALIN 200 MG: 100 CAPSULE ORAL at 09:24

## 2021-04-07 RX ADMIN — ZIPRASIDONE HYDROCHLORIDE 40 MG: 40 CAPSULE ORAL at 18:23

## 2021-04-07 RX ADMIN — PREGABALIN 200 MG: 100 CAPSULE ORAL at 22:08

## 2021-04-07 RX ADMIN — ZIPRASIDONE HYDROCHLORIDE 40 MG: 40 CAPSULE ORAL at 09:24

## 2021-04-07 RX ADMIN — AMOXICILLIN AND CLAVULANATE POTASSIUM 1 TABLET: 500; 125 TABLET, FILM COATED ORAL at 09:24

## 2021-04-07 ASSESSMENT — PAIN SCALES - GENERAL
PAINLEVEL_OUTOF10: 0
PAINLEVEL_OUTOF10: 0

## 2021-04-07 NOTE — GROUP NOTE
Group Therapy Note    Date: 4/7/2021    Group Start Time: 1005  Group End Time: 5250  Group Topic: Psychotherapy    REID GIRON    RENETTA Angelo LSW        Group Therapy Note    Attendees: 6/16         Patient's Goal:  Explore individual strengths and how they can influence personal fulfillment and interpersonal relationships    Notes:  Therapeutic worksheet was provided    Status After Intervention:  Unchanged    Participation Level:  Active Listener and Interactive    Participation Quality: Appropriate, Attentive, Sharing and Supportive      Speech:  normal      Thought Process/Content: Logical      Affective Functioning: Congruent      Mood: dysphoric      Level of consciousness:  Alert and Oriented x4      Response to Learning: Able to verbalize current knowledge/experience      Endings: None Reported    Modes of Intervention: Education, Support and Exploration      Discipline Responsible: /Counselor      Signature:  RENETTA Angelo LSW

## 2021-04-07 NOTE — CONSULTS
[facility]    CONSULTATION / HISTORY AND PHYSICAL EXAMINATION            Date:   2021  Patient name:  Hipolito Pires  Date of admission:  2021  3:04 PM  MRN:   [id number]  Account:  [de-identified]  YOB: 1985  PCP:    Jillian Shaver MD  Room:   01050105-  Code Status:    Full Code    Physician Requesting Consult: Linda Wasserman, *    Reason for Consult:  medical management    Chief Complaint:     No chief complaint on file. Leukocytosis abnormal labs    History Obtained From:     Patient medical record nursing staff    History of Present Illness:     Patient admitted to [facility] for mental health reasons  Patient denies any fever chills nausea vomiting on questioning about signs of infections or lumps and bumps patient claims have a bump in groin area patient examined in the presence of the nurse as chaperone lump noted 3 x 3 cm tender red no fluctuation  No other site of infection is noted    Past Medical History:     Past Medical History:   Diagnosis Date    Allergic rhinitis     Anemia     Asthma     Depression     Headache     Herniated intervertebral disc of lumbar spine     Shortness of breath     Tobacco abuse         Past Surgical History:     Past Surgical History:   Procedure Laterality Date     SECTION  2008    PAIN MANAGEMENT PROCEDURE Left 2020    Left C7 TRANSFORAMINAL performed by Rosa Peterson MD at [address] [facility] Left 2020    Left C7 TRANSFORAMINAL performed by Rosa Peterson MD at [address] [place] [address] Left 2021    C7 T1 Interlaminar Epidural Steroid Injection performed by Rosa Peterson MD at [address] [facility]  2008    TYMPANOSTOMY [facility]        Medications Prior to Admission:     Prior to Admission medications    Medication Sig Start Date End Date Taking?  Authorizing Provider pregabalin (LYRICA) 200 MG capsule Take 1 capsule by mouth 3 times daily for 30 days. 1/21/21 4/5/21 Yes RAMON Ricardo CNP   celecoxib (CELEBREX) 200 MG capsule Take 1 capsule by mouth 2 times daily 1/20/21  Yes RAMON Ricardo CNP   fluticasone (FLONASE) 50 MCG/ACT nasal spray SHAKE LIQUID AND USE 2 SPRAYS IN [facility] NOSTRIL DAILY 12/22/20  Yes Krystin Maza MD   albuterol (PROVENTIL) (2.5 MG/3ML) 0.083% nebulizer solution Take 3 mLs by nebulization every 4 hours as needed for Wheezing 11/23/20  Yes RAMON Maloney CNP   albuterol sulfate HFA (VENTOLIN HFA) 108 (90 Base) MCG/ACT inhaler Inhale 2 puffs into the lungs every 4 hours as needed for Wheezing ventolin 11/19/20  Yes Alisha Rawls   diclofenac sodium (VOLTAREN) 1 % GEL Apply 2 g topically 4 times daily 11/3/20  Yes RAMON Ricardo CNP   mirtazapine (REMERON) 7.5 MG tablet Take 7.5 mg by mouth nightly 6/21/20  Yes Historical Provider, MD   ziprasidone (GEODON) 40 MG capsule Take 40 mg by mouth Daily with supper  6/21/20  Yes Historical Provider, MD   fexofenadine [facility] ALLERGY) 180 MG tablet Take 1 tablet by mouth daily 7/31/19  Yes Krystin Maza MD   fluticasone (FLOVENT HFA) 44 MCG/ACT inhaler Inhale 2 puffs into the lungs 2 times daily 7/31/19  Yes Krystin Maza MD   Multiple Vitamins-Minerals (MULTIVITAMIN WOMEN PO) Take 1 tablet by mouth daily   Yes Historical Provider, MD   hydrOXYzine (VISTARIL) 50 MG capsule Take 100 mg by mouth 3 times daily as needed for Anxiety    Yes Historical Provider, MD   prazosin (MINIPRESS) 1 MG capsule Take 2 mg by mouth nightly  6/21/20   Historical Provider, MD   omeprazole (PRILOSEC OTC) 20 MG tablet Take 1 tablet by mouth daily 7/31/19   Krystin Maza MD        Allergies:     Huang Ast [hydrocodone-acetaminophen]    Social History:     Tobacco:    reports that she has been smoking e-cigarettes and cigarettes. She started smoking about 17 years ago.  She has a 5.00 pack-year smoking history. She has never used smokeless tobacco.  Alcohol:      reports current alcohol use. Drug Use:  reports current drug use. Drug: Marijuana. Family History:     Family History   Problem Relation Age of Onset    Heart Disease Mother     Other Mother         respiratory illness    Kidney Disease Mother     Early Death Mother 45        cardiac arrest    Heart Disease Father     High Blood Pressure Father     Other Father         liver/intestinal    Lung Cancer Maternal Grandmother     Diabetes Other     Early Death Other     Other Other         respiratory illness    Early Death Other     Other Other         respiratory illness    Bleeding Prob Other     Thyroid Disease Other        Review of Systems:     Positive and Negative as described in HPI. CONSTITUTIONAL:  negative for fevers, chills, sweats, fatigue, weight loss  HEENT:  negative for vision, hearing changes, runny nose, throat pain  RESPIRATORY:  negative for shortness of breath, cough, congestion, wheezing. CARDIOVASCULAR:  negative for chest pain, palpitations.   GASTROINTESTINAL:  negative for nausea, vomiting, diarrhea, constipation, change in bowel habits, abdominal pain   GENITOURINARY:  negative for difficulty of urination, burning with urination, frequency   INTEGUMENT:  negative for rash, skin lesions, easy bruising   HEMATOLOGIC/LYMPHATIC:  negative for swelling/edema   ALLERGIC/IMMUNOLOGIC:  negative for urticaria , itching  ENDOCRINE:  negative increase in drinking, increase in urination, hot or cold intolerance  MUSCULOSKELETAL:  negative joint pains, muscle aches, swelling of joints  NEUROLOGICAL:  negative for headaches, dizziness, lightheadedness, numbness, pain, tingling extremities      Physical Exam:     /80   Pulse 84   Temp 97.1 °F (36.2 °C) (Oral)   Resp 14   LMP 2021 (Approximate)   Temp (24hrs), Av.8 °F (36.6 °C), Min:97.1 °F (36.2 °C), Max:98.3 °F (36.8 °C)    No results for input(s): POCGLU in the last 72 hours. No intake or output data in the 24 hours ending 04/07/21 1148    General Appearance:  alert, well appearing, and in no acute distress  Mental status: oriented to person, place, and time with normal affect  Head:  normocephalic, atraumatic. Eye: no icterus, redness, pupils equal and reactive, extraocular eye movements intact, conjunctiva clear  Ear: normal external ear, no discharge, hearing intact  Nose:  no drainage noted  Mouth: mucous membranes moist  Neck: supple, no carotid bruits, thyroid not palpable  Lungs: Bilateral equal air entry, clear to ausculation, no wheezing, rales or rhonchi, normal effort  Cardiovascular: normal rate, regular rhythm, no murmur, gallop, rub. Abdomen: Soft, nontender, nondistended, normal bowel sounds, no hepatomegaly or splenomegaly  Neurologic: There are no new focal motor or sensory deficits, normal muscle tone and bulk, no abnormal sensation, normal speech, cranial nerves II through XII grossly intact  Skin: No gross lesions, rashes, bruising or bleeding on exposed skin area  Extremities:  peripheral pulses palpable, no pedal edema or calf pain with palpation  Left groin and upper thigh indurated lump noted no fluctuation  Patient examined with a chaperone nurse  Psych: Flat affect    Investigations:      Laboratory Testing:  No results found for this or any previous visit (from the past 24 hour(s)). Consultations:   IP CONSULT TO INTERNAL MEDICINE  IP CONSULT TO INTERNAL MEDICINE  Assessment :      Primary Problem  Acute psychosis [facility])    Active Hospital Problems    Diagnosis Date Noted    [facility] [F23] 04/04/2021    Migraine without aura and with status migrainosus, not intractable [G43.001] 03/29/2021    Cervical radiculopathy [M54.12] 08/27/2020    Moderate asthma [J45.909] 07/31/2018    Vitamin D deficiency [E55.9] 11/16/2017    Tobacco abuse [Z72.0]        Plan:     1.  Leukocytosis 18,000 secondary to

## 2021-04-07 NOTE — GROUP NOTE
Group Therapy Note    Date: 4/7/2021    Group Start Time: 0900  Group End Time: 0915  Group Topic: Community Meeting    [facility] [place], [address]    Patient refused to attend Community Meeting Group at 0900 after encouragement from staff. 1:1 talk time offered.     Signature:  Rhett Garcia

## 2021-04-07 NOTE — GROUP NOTE
Group Therapy Note    Date: 4/7/2021    Group Start Time: 1330  Group End Time: 0223  Group Topic: Recreational    [address], [facility]    Patient refused to attend Recreational Therapy Group at 1330 after encouragement from staff. 1:1 talk time offered.     Signature:  Chris Parry

## 2021-04-07 NOTE — PROGRESS NOTES
Daily Progress Note  4/7/2021      CHIEF COMPLAINT:  Acute psychosis    Reviewed patient's current plan of care and vital signs with nursing staff. Vitals:    04/06/21 0740 04/06/21 1930 04/06/21 2236 04/07/21 0825   BP: 126/84 (!) 107/56 (!) 99/55 125/80   Pulse: 103 78 58 84   Resp: 14 14 14 14   Temp:  98.3 °F (36.8 °C) 97.9 °F (36.6 °C) 97.1 °F (36.2 °C)   TempSrc:  Oral Oral Oral     Sleep: Per [facility] Adult Daily Assessment flowsheet, staff documented 7 hours of sleep. Attending groups: Yes, selectively         SUBJECTIVE:    Patient is medication compliant and behaviorally in control. Staff report no overnight events. Patient is resting in her room at start of assessment and is agreeable to conversation with writer. She is anxious and focused on discharge. States that she was speaking with people who plan to pick her up and needs a specific times that they can be ready. Patient is tearful and expresses having a history of meeting scheduled for preparation. She is redirectable and her concerns were addressed. We discussed patient's reason for admission, and she states that she was paranoid, psychotic, not sleeping. She endorses feeling much more oriented today and feels that is due to multiple reasons, including not using illicit substances. Patient voices sleeping well overnight and feels well rested during the day. She is oriented to self, situation, time, and place. She is able to complete serial threes, has improved attention, and demonstrates remote and recent memory. Patient denies any side effects to her medications and feels that they are working well. Patient has a good appetite. She has been attending some groups and feels that they have been beneficial.  Patient not utilizing any as needed medications. Patient is denying any thoughts to harm herself or others. At this time, we will continue inpatient hospitalization with plan to discharge tomorrow with continued stability. Mental Status Exam  Level of consciousness: Alert and oriented  Appearance: Appropriate attire for setting, seated on bed, with fair grooming and hygiene   Behavior/Motor: Approachable, no psychomotor abnormalities noted  Attitude toward examiner: Cooperative, attentive, good eye contact, tearful  Speech: Normal rate, normal volume and well articulated  Mood: Anxious  Affect: Full  Thought processes: Linear, goal directed and coherent  Thought content: Denies homicidal ideation  Suicidal Ideation: Denies suicidal ideation; contracts for safety on the unit  Delusions: No evidence of delusions, reports improvement in paranoia   Perceptual Disturbance: Denies any perceptual disturbance  Cognition: Oriented to self, location, time, and situation  Memory: Intact  Insight & Judgement: Fair  Medication side effects: Denies     Data   oral temperature is 97.1 °F (36.2 °C). Her blood pressure is 125/80 and her pulse is 84. Her respiration is 14. Labs:   Admission on 04/04/2021   Component Date Value Ref Range Status    WBC 04/04/2021 18.3* 3.5 - 11.0 k/uL Final    RBC 04/04/2021 4.93  4.0 - 5.2 m/uL Final    Hemoglobin 04/04/2021 14.5  12.0 - 16.0 g/dL Final    Hematocrit 04/04/2021 43.6  36 - 46 % Final    MCV 04/04/2021 88.3  80 - 100 fL Final    MCH 04/04/2021 29.4  26 - 34 pg Final    MCHC 04/04/2021 33.3  31 - 37 g/dL Final    RDW 04/04/2021 13.3  11.5 - 14.9 % Final    Platelets 18/67/7329 310  150 - 450 k/uL Final    MPV 04/04/2021 8.3  6.0 - 12.0 fL Final    NRBC Automated 04/04/2021 NOT REPORTED  per 100 WBC Final    Color, UA 04/04/2021 DARK YELLOW* YELLOW Final    Turbidity UA 04/04/2021 CLEAR  CLEAR Final    Glucose, Ur 04/04/2021 NEGATIVE  NEGATIVE Final    Bilirubin Urine 04/04/2021 Presumptive positive. Unable to confirm due to unavailability of reagent. * NEGATIVE Corrected    CORRECTED ON 04/04 AT 1817: PREVIOUSLY REPORTED AS NO CELLS SEEN    Ketones, Urine 04/04/2021 MOD* NEGATIVE Final    REPORTED  0 % Final    Segs Absolute 04/05/2021 10.50* 1.3 - 9.1 k/uL Final    Absolute Lymph # 04/05/2021 2.20  1.0 - 4.8 k/uL Final    Absolute Mono # 04/05/2021 0.90  0.1 - 1.3 k/uL Final    Absolute Eos # 04/05/2021 0.20  0.0 - 0.4 k/uL Final    Basophils Absolute 04/05/2021 0.10  0.0 - 0.2 k/uL Final    Absolute Immature Granulocyte 04/05/2021 NOT REPORTED  0.00 - 0.30 k/uL Final    WBC Morphology 04/05/2021 NOT REPORTED   Final    RBC Morphology 04/05/2021 NOT REPORTED   Final    Platelet Estimate 22/88/1437 NOT REPORTED   Final          Labs reviewed. Last EKG in electronic medical record reviewed.     Medications  Current Facility-Administered Medications: albuterol (PROVENTIL) nebulizer solution 2.5 mg, 2.5 mg, Nebulization, Q4H PRN  albuterol sulfate  (90 Base) MCG/ACT inhaler 2 puff, 2 puff, Inhalation, Q4H PRN  celecoxib (CELEBREX) capsule 200 mg, 200 mg, Oral, BID  diclofenac sodium (VOLTAREN) 1 % gel 2 g, 2 g, Topical, 4x Daily  fluticasone (FLONASE) 50 MCG/ACT nasal spray 2 spray, 2 spray, Each Nostril, Daily  fluticasone (FLOVENT HFA) 44 MCG/ACT inhaler 2 puff, 2 puff, Inhalation, BID  mirtazapine (REMERON) tablet 7.5 mg, 7.5 mg, Oral, Nightly  therapeutic multivitamin-minerals 1 tablet, 1 tablet, Oral, Daily  prazosin (MINIPRESS) capsule 2 mg, 2 mg, Oral, Nightly  pregabalin (LYRICA) capsule 200 mg, 200 mg, Oral, TID  cetirizine (ZYRTEC) tablet 10 mg, 10 mg, Oral, Daily  pantoprazole (PROTONIX) tablet 40 mg, 40 mg, Oral, QAM AC  amoxicillin-clavulanate (AUGMENTIN) 500-125 MG per tablet 1 tablet, 1 tablet, Oral, 2 times per day  hydrOXYzine (ATARAX) tablet 100 mg, 100 mg, Oral, TID PRN  ziprasidone (GEODON) capsule 40 mg, 40 mg, Oral, BID WC  aluminum & magnesium hydroxide-simethicone (MAALOX) 200-200-20 MG/5ML suspension 30 mL, 30 mL, Oral, Q6H PRN  nicotine (NICODERM CQ) 14 MG/24HR 1 patch, 1 patch, Transdermal, Daily  polyethylene glycol (GLYCOLAX) packet 17 g, 17 g, Oral, Daily PRN  LORazepam (ATIVAN) injection 2 mg, 2 mg, Intramuscular, Q4H PRN **AND** haloperidol lactate (HALDOL) injection 5 mg, 5 mg, Intramuscular, Q4H PRN **AND** diphenhydrAMINE (BENADRYL) injection 50 mg, 50 mg, Intramuscular, Q4H PRN  LORazepam (ATIVAN) tablet 2 mg, 2 mg, Oral, Q4H PRN **AND** haloperidol (HALDOL) tablet 5 mg, 5 mg, Oral, Q4H PRN    ASSESSMENT  Acute psychosis (HCC)         PLAN  Discussed treatment plan with Dr. Jeanna Jones  Patient symptoms are:  [] Well controlled  [x] Improving  [] Worsening  [] No change   Internal medicine managing leukocytosis  Continue current medication regimen. Attempt to develop insight. Psycho-education conducted. Supportive Therapy conducted. Probable discharge is 1 to 2 days with continued improvement   Follow-up daily while inpatient. Electronically signed by DEQUAN Kaur on 4/7/2021    **This report has been created using voice recognition software. It may contain minor errors which are inherent in voice recognition technology. **                                             Psychiatry Attending Attestation     I independently saw and evaluated the patient. I reviewed the nurse practitioner's documentation above. Any additional comments or changes to the nurse practitioners documentation are stated below otherwise agree with assessment. Patient feels better than before. Mood and affect are better. Patient reports fleeting suicidal thoughts with no intent or plan. Patient notes that these thoughts are occurring less frequently. Denies any homicidal thoughts, that was explored with the patient. Oriented to time place and person. Recent and remote memory is intact. Patient feels hopeful. Sleep and appetite is good. No side effect from medication reported. Side-effect of medication were discussed with the patient . Patient is responding to current treatment. Discharge soon, if patient continues to show improvement. Case discussed with the staff. Electronically signed by Emmy Malik MD on 4/7/21 at 8:03 PM EDT

## 2021-04-07 NOTE — GROUP NOTE
Group Therapy Note    Date: 4/7/2021    Group Start Time: 1100  Group End Time: 0412  Group Topic: Psychoeducation    JAIME Joe    Patient refused to attend Psychoeducation Group at 1100 after encouragement from staff. 1:1 talk time offered.     Signature:  Zoila Massey

## 2021-04-08 ENCOUNTER — CARE COORDINATION (OUTPATIENT)
Dept: CARE COORDINATION | Age: 36
End: 2021-04-08

## 2021-04-08 VITALS
RESPIRATION RATE: 14 BRPM | DIASTOLIC BLOOD PRESSURE: 76 MMHG | SYSTOLIC BLOOD PRESSURE: 120 MMHG | HEART RATE: 88 BPM | TEMPERATURE: 97.3 F

## 2021-04-08 PROCEDURE — 6370000000 HC RX 637 (ALT 250 FOR IP): Performed by: INTERNAL MEDICINE

## 2021-04-08 PROCEDURE — 6370000000 HC RX 637 (ALT 250 FOR IP): Performed by: PSYCHIATRY & NEUROLOGY

## 2021-04-08 PROCEDURE — 99239 HOSP IP/OBS DSCHRG MGMT >30: CPT | Performed by: PSYCHIATRY & NEUROLOGY

## 2021-04-08 RX ORDER — ZIPRASIDONE HYDROCHLORIDE 40 MG/1
40 CAPSULE ORAL 2 TIMES DAILY WITH MEALS
Qty: 60 CAPSULE | Refills: 0 | Status: SHIPPED | OUTPATIENT
Start: 2021-04-08

## 2021-04-08 RX ORDER — AMOXICILLIN AND CLAVULANATE POTASSIUM 500; 125 MG/1; MG/1
1 TABLET, FILM COATED ORAL EVERY 12 HOURS SCHEDULED
Qty: 14 TABLET | Refills: 0 | Status: SHIPPED | OUTPATIENT
Start: 2021-04-08 | End: 2021-04-15

## 2021-04-08 RX ADMIN — FLUTICASONE PROPIONATE 2 SPRAY: 50 SPRAY, METERED NASAL at 08:38

## 2021-04-08 RX ADMIN — ZIPRASIDONE HYDROCHLORIDE 40 MG: 40 CAPSULE ORAL at 08:37

## 2021-04-08 RX ADMIN — CETIRIZINE HYDROCHLORIDE 10 MG: 10 TABLET, FILM COATED ORAL at 08:37

## 2021-04-08 RX ADMIN — PREGABALIN 200 MG: 100 CAPSULE ORAL at 08:37

## 2021-04-08 RX ADMIN — MULTIPLE VITAMINS W/ MINERALS TAB 1 TABLET: TAB at 08:37

## 2021-04-08 RX ADMIN — AMOXICILLIN AND CLAVULANATE POTASSIUM 1 TABLET: 500; 125 TABLET, FILM COATED ORAL at 08:42

## 2021-04-08 RX ADMIN — PANTOPRAZOLE SODIUM 40 MG: 40 TABLET, DELAYED RELEASE ORAL at 08:37

## 2021-04-08 RX ADMIN — HYDROXYZINE HYDROCHLORIDE 100 MG: 50 TABLET, FILM COATED ORAL at 04:37

## 2021-04-08 RX ADMIN — CELECOXIB 200 MG: 200 CAPSULE ORAL at 08:43

## 2021-04-08 NOTE — GROUP NOTE
Group Therapy Note    Date: 4/8/2021    Group Start Time: 0900  Group End Time: 0915  Group Topic: Community Meeting    [facility]    [place], [place]        Group Therapy Note    Attendees: 9/17         Patient's Goal:  To increase interpersonal interaction. Notes:  Pt attended and participated in group. Status After Intervention:  Improved    Participation Level:  Active Listener and Interactive    Participation Quality: Appropriate, Attentive and Sharing      Speech:  normal      Thought Process/Content: Logical      Affective Functioning: Congruent      Mood: euthymic      Level of consciousness:  Alert and Attentive      Response to Learning: Able to verbalize current knowledge/experience, Able to retain information and Progressing to goal      Endings: None Reported    Modes of Intervention: Education, Support, Socialization, Clarifying and Reality-testing      Discipline Responsible: Psychoeducational Specialist      Signature:  Dominique Colon
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
## 2021-04-08 NOTE — PROGRESS NOTES
Patient given tobacco quitline number 40500006454 at this time, refusing to call at this time, states \" I just dont want to quit now\"- patient given information as to the dangers of long term tobacco use. Continue to reinforce the importance of tobacco cessation.
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
## 2021-04-08 NOTE — PROGRESS NOTES
[address] [facility]  Discharge Note    Pt discharged with followings belongings:   Dentures: Lowers, Uppers  Vision - Corrective Lenses: None  Hearing Aid: None  Jewelry: Ring, Necklace  Body Piercings Removed: N/A  Clothing: Footwear, Pants, Shirt, Socks, Undergarments (Comment)  Were All Patient Medications Collected?: Not Applicable  Other Valuables: Other (Comment)(Card zhou, several cards, necklace)   Valuables sent home with pt. Valuables returned to patient. Patient education on aftercare instructions: yes  Information faxed to [facility] by writer Patient verbalize understanding of AVS:  yes. Status EXAM upon discharge:  Status and Exam  Normal: No  Facial Expression: Avoids Gaze, Flat  Affect: Blunt  Level of Consciousness: Alert  Mood:Normal: No  Mood: Depressed, Anxious  Motor Activity:Normal: No  Motor Activity: Decreased  Interview Behavior: Cooperative  Preception: [place] to Person, Jillene Pry to Time, [place] to Place, [place] to Situation  Attention:Normal: Yes  Attention: Hyperalert  Thought Processes: Circumstantial  Thought Content:Normal: No  Thought Content: Preoccupations  Hallucinations: None  Delusions: No  Memory:Normal: Yes  Memory: Poor Recent, Poor Remote  Insight and Judgment: Yes  Insight and Judgment: Unmotivated  Present Suicidal Ideation: No  Present Homicidal Ideation: No      Metabolic Screening:    Lab Results   Component Value Date    LABA1C 4.6 (L) 06/07/2017       Lab Results   Component Value Date    CHOL 208 (H) 05/11/2017     Lab Results   Component Value Date    TRIG 72 05/11/2017     Lab Results   Component Value Date    HDL 70 05/11/2017     No components found for: LDLCAL  No results found for: LABVLDL    Pt discharged to ml by friend. All belongings et valuables sent with pt. Pt verbalizes all discharge instructions.      Darvin Johnson RN
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
## 2021-04-08 NOTE — CARE COORDINATION
Name: Jonathan Brush    : 1985    Discharge Date: 2021    Primary Auth/Cert #: [id number]    Destination: Private residence    Discharge Medications:      Medication List      START taking these medications    amoxicillin-clavulanate 500-125 MG per tablet  Commonly known as: AUGMENTIN  Take 1 tablet by mouth every 12 hours for 7 days  Notes to patient: antibiotic        CHANGE how you take these medications    ziprasidone 40 MG capsule  Commonly known as: GEODON  Take 1 capsule by mouth 2 times daily (with meals)  What changed: when to take this  Notes to patient: Clear thoughts        CONTINUE taking these medications    * albuterol sulfate  (90 Base) MCG/ACT inhaler  Commonly known as: Ventolin HFA  Inhale 2 puffs into the lungs every 4 hours as needed for Wheezing ventolin  Notes to patient: Breathing aid     * albuterol (2.5 MG/3ML) 0.083% nebulizer solution  Commonly known as: PROVENTIL  Take 3 mLs by nebulization every 4 hours as needed for Wheezing  Notes to patient: Breathing aid     celecoxib 200 MG capsule  Commonly known as: CeleBREX  Take 1 capsule by mouth 2 times daily  Notes to patient: pain     diclofenac sodium 1 % Gel  Commonly known as: VOLTAREN  Apply 2 g topically 4 times daily  Notes to patient: pain     fluticasone 44 MCG/ACT inhaler  Commonly known as: Flovent HFA  Inhale 2 puffs into the lungs 2 times daily  Notes to patient: Breathing aid     fluticasone 50 MCG/ACT nasal spray  Commonly known as: FLONASE  SHAKE LIQUID AND USE 2 SPRAYS IN EACH NOSTRIL DAILY  Notes to patient: allergies     hydrOXYzine 50 MG capsule  Commonly known as: VISTARIL  Notes to patient: anxiety     mirtazapine 7.5 MG tablet  Commonly known as: REMERON  Notes to patient: depression     MULTIVITAMIN WOMEN PO  Notes to patient: vitamin     prazosin 1 MG capsule  Commonly known as: MINIPRESS  Notes to patient: PTSD     pregabalin 200 MG capsule  Commonly known as: LYRICA  Take 1 capsule by mouth 3 times daily for 30 days. Notes to patient: pain         * This list has 2 medication(s) that are the same as other medications prescribed for you. Read the directions carefully, and ask your doctor or other care provider to review them with you. STOP taking these medications    fexofenadine 180 MG tablet  Commonly known as:  Allegra Allergy     ibuprofen 800 MG tablet  Commonly known as: ADVIL;MOTRIN     ketorolac 10 MG tablet  Commonly known as: TORADOL     omeprazole 20 MG tablet  Commonly known as: PriLOSEC OTC           Where to Get Your Medications      These medications were sent to [address] [place] [address] Jus Sears, [facility]    Phone: [phone]   · amoxicillin-clavulanate 500-125 MG per tablet  · ziprasidone 40 MG capsule         Follow Up Appointment: [address], [address] Jaki Gonzales  [phone]  Go on 4/16/2021  @ 3:00 PM with Jaron Chacko; please bring insurance card, photo ID, and a mask

## 2021-04-08 NOTE — GROUP NOTE
Group Therapy Note    Date: 4/8/2021    Group Start Time: 1000  Group End Time: 1521  Group Topic: Psychoeducation    [facility]    [place], [place]        Group Therapy Note    Attendees: 3/7         Pt did not attend RT skills group d/t resting in room despite staff invitation to attend. 1:1 talk time offered as alternative to group session, pt declined.

## 2021-04-12 ENCOUNTER — CARE COORDINATION (OUTPATIENT)
Dept: CARE COORDINATION | Age: 36
End: 2021-04-12

## 2021-04-12 NOTE — CARE COORDINATION
Ruiz Moore was seen [facility] ER 3/25/2021- Abscess groin. I and D done- given Keflex, Bactrim, and Ibuprofen. Advised to F/U with surgeon. She is eligible for [facility].      3/26/2021- 12:36 pm- Left message requesting return call re: Initial ER/Covid F/U call and discuss ACC.   3/29/2021- 10:13 am Left message requesting return call.      4/8/2021- discharged from [facility]. 4/12/2021- Unable to reach by phone- number no longer a working number. Letter to be mailed.

## 2021-04-12 NOTE — LETTER
4/12/2021    Camille Vizcaino  [address] [facility]      Dear Camille Vizcaino,    My name is Reji Atwood and I am a registered nurse who partners with Bronson Mao MD to improve patients' health. Bronson Mao MD believes you would benefit from working with me. As a member of your health care team, I would work with other providers involved in your care, offer education for your specific health conditions, and connect you with additional resources as needed. I will collaborate with Bronson Mao MD to support you in following your treatment plan. The additional support I provide is no additional cost to you. My primary focus is to help you achieve specific goals and improve your health. We are committed to walk with you on this journey and look forward to working with you. Please call me to further discuss your healthcare needs. I am available by phone or for appointments at the office. You can reach me at [phone].  I am available Monday- Friday 8-4:30 pm.     In good health,       Reji Atwood RN

## 2021-04-14 ENCOUNTER — CARE COORDINATION (OUTPATIENT)
Dept: CARE COORDINATION | Age: 36
End: 2021-04-14

## 2021-04-22 ENCOUNTER — CARE COORDINATION (OUTPATIENT)
Dept: CARE COORDINATION | Age: 36
End: 2021-04-22

## 2021-04-22 NOTE — CARE COORDINATION
William Lares was seen [facility] ER 3/25/2021- Abscess groin. I and D done- given Keflex, Bactrim, and Ibuprofen. Advised to F/U with surgeon. She is eligible for [facility].      3/26/2021- 12:36 pm- Left message requesting return call re: Initial ER/Covid F/U call and discuss ACC.   3/29/2021- 10:13 am Left message requesting return call.      4/8/2021- discharged from [facility].      4/12/2021- Unable to reach by phone- number no longer a working number. Letter to be mailed.      4/14/2021- letter was mailed. 4/22/2021- Unable to reach by phone. Per protocol- no further outreaches to enroll in [facility] at this time. No future appointments.

## 2021-07-29 ENCOUNTER — NURSE TRIAGE (OUTPATIENT)
Dept: OTHER | Facility: CLINIC | Age: 36
End: 2021-07-29

## 2021-07-29 ENCOUNTER — APPOINTMENT (OUTPATIENT)
Dept: CT IMAGING | Age: 36
End: 2021-07-29
Payer: MEDICARE

## 2021-07-29 ENCOUNTER — TELEPHONE (OUTPATIENT)
Dept: PRIMARY CARE CLINIC | Age: 36
End: 2021-07-29

## 2021-07-29 ENCOUNTER — HOSPITAL ENCOUNTER (EMERGENCY)
Age: 36
Discharge: HOME | End: 2021-07-29
Attending: EMERGENCY MEDICINE
Payer: MEDICARE

## 2021-07-29 VITALS
WEIGHT: 220 LBS | BODY MASS INDEX: 37.76 KG/M2 | TEMPERATURE: 98.7 F | RESPIRATION RATE: 16 BRPM | DIASTOLIC BLOOD PRESSURE: 109 MMHG | HEART RATE: 67 BPM | SYSTOLIC BLOOD PRESSURE: 148 MMHG | OXYGEN SATURATION: 96 %

## 2021-07-29 DIAGNOSIS — S09.90XA CLOSED HEAD INJURY, INITIAL ENCOUNTER: Primary | ICD-10-CM

## 2021-07-29 PROCEDURE — 99284 EMERGENCY DEPT VISIT MOD MDM: CPT

## 2021-07-29 PROCEDURE — 70450 CT HEAD/BRAIN W/O DYE: CPT

## 2021-07-29 PROCEDURE — 72125 CT NECK SPINE W/O DYE: CPT

## 2021-07-29 ASSESSMENT — PAIN DESCRIPTION - PAIN TYPE: TYPE: ACUTE PAIN

## 2021-07-29 ASSESSMENT — PAIN DESCRIPTION - ONSET: ONSET: ON-GOING

## 2021-07-29 ASSESSMENT — PAIN - FUNCTIONAL ASSESSMENT: PAIN_FUNCTIONAL_ASSESSMENT: ACTIVITIES ARE NOT PREVENTED

## 2021-07-29 ASSESSMENT — PAIN DESCRIPTION - DESCRIPTORS: DESCRIPTORS: ACHING;DULL

## 2021-07-29 ASSESSMENT — PAIN DESCRIPTION - FREQUENCY: FREQUENCY: CONTINUOUS

## 2021-07-29 ASSESSMENT — PAIN DESCRIPTION - LOCATION: LOCATION: HEAD

## 2021-07-29 ASSESSMENT — PAIN SCALES - GENERAL: PAINLEVEL_OUTOF10: 9

## 2021-07-29 ASSESSMENT — PAIN DESCRIPTION - ORIENTATION: ORIENTATION: RIGHT;ANTERIOR

## 2021-07-29 ASSESSMENT — PAIN DESCRIPTION - PROGRESSION: CLINICAL_PROGRESSION: NOT CHANGED

## 2021-07-29 NOTE — TELEPHONE ENCOUNTER
[facility] from [facility] with "[employer]. Brief description of triage:    Two days ago patient bumped her head on a handle, has numbness on right side and is dizzy, had a nose bleed yesterday. Triage indicates for patient to call 911, patient refused, patient educated on why she should call 911, patient still refused. 2nd level triage completed with Hattie Milian NP; provider recommends patient be seen in the ED. Care advice provided, patient verbalizes understanding; denies any other questions or concerns; instructed to call back for any new or worsening symptoms. Attention Provider: Thank you for allowing me to participate in the care of your patient. The patient was connected to triage in response to information provided to the [facility]. Please do not respond through this encounter as the response is not directed to a shared pool. Reason for Disposition   ACUTE NEUROLOGIC SYMPTOM and symptom present now    Answer Assessment - Initial Assessment Questions  1. MECHANISM: \"How did the injury happen? \" For falls, ask: \"What height did you fall from? \" and \"What surface did you fall against?\"      See above notes    2. ONSET: \"When did the injury happen? \" (Minutes or hours ago)       Two days ago    3. NEUROLOGIC SYMPTOMS: \"Was there any loss of consciousness? \" \"Are there any other neurological symptoms? \"       See above note, did not loose consciousness    4. MENTAL STATUS: \"Does the person know who he is, who you are, and where he is? \"       Yes    5. LOCATION: \"What part of the head was hit? \"       Forehead, on the right side    6. SCALP APPEARANCE: \"What does the scalp look like? Is it bleeding now? \" If so, ask: \"Is it difficult to stop? \"       Has a knot    7. SIZE: For cuts, bruises, or swelling, ask: \"How large is it? \" (e.g., inches or centimeters)       50 cent piece    8. PAIN: \"Is there any pain? \" If so, ask: \"How bad is it? \"  (e.g., Scale 1-10; or mild, moderate, severe)      No    9. TETANUS: For any breaks in the skin, ask: \"When was the last tetanus booster? \"      Small gash that did not bleed, last year had a tetanus    10. OTHER SYMPTOMS: \"Do you have any other symptoms? \" (e.g., neck pain, vomiting)        See above note, nausea, dizzy    11. PREGNANCY: \"Is there any chance you are pregnant? \" \"When was your last menstrual period? \"        No, any time her period is supposed to start    Protocols used: HEAD INJURY-ADULT-[place]

## 2021-07-29 NOTE — TELEPHONE ENCOUNTER
Pt presented to  for CC: headache,nose bleeds, dizziness,     Pt sustained head injury at work two days ago. She did not have any significant symptoms at that time, but health declined in two days since injury. Pt was taken directly to ED via wheelchair/[employer] [place].

## 2021-07-29 NOTE — ED PROVIDER NOTES
[facility] ED  [address] [facility]  [place] [address]  Phone: [phone]        Pt Name: Rachel Calvillo  MRN: [id number]  [place] 1985  Date of evaluation: 21      CHIEF COMPLAINT     Chief Complaint   Patient presents with    Head Injury    Fall    Hip Pain     right    Knee Pain     right         HISTORY OF PRESENT ILLNESS  (Location/Symptom, Timing/Onset, Context/Setting, Quality, Duration, Modifying Factors, Severity.)    Rachel Calvillo is a 39 y.o. female who presents after a fall. The patient states she was at work when she fell 2 days ago striking the right side of her head she states that there was no loss of consciousness however yesterday she had a nosebleed today she called off work as she is felt nauseated she has had some blurred vision she states the right side of her body now hurts she is able to move her extremities without any difficulty but she states the entire right side of her body has of the pain any tingling sensation she also states she does have some neck pain no chest pain no shortness of breath no abdominal pain nothing she does makes her symptoms better or worse      REVIEW OF SYSTEMS    (2-9 systems for level 4, 10 or more for level 5)     Review of Systems   Musculoskeletal: Positive for neck pain. Right-sided body pain   Skin:        Contusion to right forehead   Neurological: Positive for headaches. All other systems reviewed and are negative. PAST MEDICAL HISTORY    has a past medical history of Allergic rhinitis, Anemia, Asthma, Depression, Headache, Herniated intervertebral disc of lumbar spine, Shortness of breath, and Tobacco abuse. SURGICAL HISTORY      has a past surgical history that includes Tonsillectomy and adenoidectomy (); Tympanostomy tube placement ();  section (); Tubal ligation (); Pain management procedure (Left, 2020);  Pain management procedure (Left, 2020); and Pain management procedure (Left, 2021). CURRENTMEDICATIONS       Previous Medications    ALBUTEROL (PROVENTIL) (2.5 MG/3ML) 0.083% NEBULIZER SOLUTION    Take 3 mLs by nebulization every 4 hours as needed for Wheezing    ALBUTEROL SULFATE HFA (VENTOLIN HFA) 108 (90 BASE) MCG/ACT INHALER    Inhale 2 puffs into the lungs every 4 hours as needed for Wheezing ventolin    CELECOXIB (CELEBREX) 200 MG CAPSULE    Take 1 capsule by mouth 2 times daily    DICLOFENAC SODIUM (VOLTAREN) 1 % GEL    Apply 2 g topically 4 times daily    FLUTICASONE (FLONASE) 50 MCG/ACT NASAL SPRAY    SHAKE LIQUID AND USE 2 SPRAYS IN EACH NOSTRIL DAILY    FLUTICASONE (FLOVENT HFA) 44 MCG/ACT INHALER    Inhale 2 puffs into the lungs 2 times daily    HYDROXYZINE (VISTARIL) 50 MG CAPSULE    Take 100 mg by mouth 3 times daily as needed for Anxiety     MIRTAZAPINE (REMERON) 7.5 MG TABLET    Take 7.5 mg by mouth nightly    MULTIPLE VITAMINS-MINERALS (MULTIVITAMIN WOMEN PO)    Take 1 tablet by mouth daily    PRAZOSIN (MINIPRESS) 1 MG CAPSULE    Take 2 mg by mouth nightly     PREGABALIN (LYRICA) 200 MG CAPSULE    Take 1 capsule by mouth 3 times daily for 30 days. ZIPRASIDONE (GEODON) 40 MG CAPSULE    Take 1 capsule by mouth 2 times daily (with meals)       ALLERGIES     is allergic to norco [hydrocodone-acetaminophen]. FAMILY HISTORY     She indicated that her mother is . She indicated that her father is alive. She indicated that all of her three sisters are alive. She indicated that the status of her maternal grandmother is unknown. She indicated that only one of her three others is alive. family history includes Bleeding Prob in an other family member; Diabetes in an other family member; Early Death in some other family members; Early Death (age of onset: 45) in her mother; Heart Disease in her father and mother; High Blood Pressure in her father; Kidney Disease in her mother; [place] in her maternal grandmother;  Other in her father, mother, and other family members; Thyroid Disease in an other family member. SOCIAL HISTORY      reports that she has been smoking e-cigarettes and cigarettes. She started smoking about 17 years ago. She has a 5.00 pack-year smoking history. She has never used smokeless tobacco. She reports current alcohol use. She reports current drug use. Drug: Marijuana. PHYSICAL EXAM    (up to 7 for level 4, 8 or more for level 5)   INITIAL VITALS:  weight is 220 lb (99.8 kg). Her tympanic temperature is 98.7 °F (37.1 °C). Her blood pressure is 141/109 (abnormal) and her pulse is 69. Her respiration is 12 and oxygen saturation is 95%. Physical Exam  Vitals and nursing note reviewed. Constitutional:       Appearance: Normal appearance. HENT:      Head:      Comments: Contusion noted to the right forehead otherwise without further rashes or lesions     Right Ear: Tympanic membrane normal.      Left Ear: Tympanic membrane normal.      Ears:      Comments: No hemotympanum  Eyes:      Extraocular Movements: Extraocular movements intact. Conjunctiva/sclera: Conjunctivae normal.      Pupils: Pupils are equal, round, and reactive to light. Neck:      Comments: Patient is noted to have some posterior cervical area discomfort  Cardiovascular:      Rate and Rhythm: Normal rate and regular rhythm. Pulmonary:      Effort: Pulmonary effort is normal.      Breath sounds: Normal breath sounds. Chest:      Chest wall: No tenderness. Abdominal:      General: There is no distension. Palpations: Abdomen is soft. Tenderness: There is no abdominal tenderness. There is no guarding. Musculoskeletal:         General: Normal range of motion.       Comments: Good pulses motor sensation throughout all extremities no overt bony point tenderness appreciated in any of the extremities patient was noted to have some posterior cervical area discomfort otherwise no other specific tenderness appreciated   Skin:     General: Skin is hydrocephalus. ORBITS: The visualized portion of the orbits demonstrate no acute abnormality. SINUSES: The visualized paranasal sinuses and mastoid air cells demonstrate   no acute abnormality. SOFT TISSUES/SKULL:  No acute abnormality of the visualized skull or soft   tissues.                     CT CERVICAL SPINE WO CONTRAST (Final result)  Result time 07/29/21 19:17:07  Final result by Luis Hope MD (07/29/21 19:17:07)                Impression:    No acute abnormality of the cervical spine. Narrative:    EXAMINATION:   CT OF THE CERVICAL SPINE WITHOUT CONTRAST 7/29/2021 6:35 pm     TECHNIQUE:   CT of the cervical spine was performed without the administration of   intravenous contrast. Multiplanar reformatted images are provided for review. Dose modulation, iterative reconstruction, and/or weight based adjustment of   the mA/kV was utilized to reduce the radiation dose to as low as reasonably   achievable. COMPARISON:   July 13, 2018     HISTORY:   ORDERING SYSTEM PROVIDED HISTORY: fall   TECHNOLOGIST PROVIDED HISTORY:   fall   Decision Support Exception - unselect if not a suspected or confirmed   emergency medical condition->Emergency Medical Condition (MA)   Is the patient pregnant?->No   Reason for Exam: Fall head injury     FINDINGS:   BONES/ALIGNMENT: There is no acute fracture or traumatic malalignment. DEGENERATIVE CHANGES: No significant degenerative changes. SOFT TISSUES: There is no prevertebral soft tissue swelling. LABS:  No results found for this visit on 07/29/21.         EMERGENCY DEPARTMENT COURSE:   Vitals:    Vitals:    07/29/21 1805   BP: (!) 141/109   Pulse: 69   Resp: 12   Temp: 98.7 °F (37.1 °C)   TempSrc: Tympanic   SpO2: 95%   Weight: 220 lb (99.8 kg)     -------------------------  BP: (!) 141/109, Temp: 98.7 °F (37.1 °C), Pulse: 69, Resp: 12      RE-EVALUATION:  CT of the head and CT of the C-spine reported by radiology as no acute process the patient is neurovascularly intact given this I do feel able to be followed up as an outpatient I will print out head injury instructions we will write a work note for today I am recommending that she call her family doctor tomorrow for follow-up  The patient presents with a closed head injury. The patient is neurologically intact. The presentation does not suggest a serious head injury. Signs and symptoms of a serious head injury have been discussed with the patient and caregiver, who will be vigilant for these. Concerns of repeat head injury have also been discussed. The patient has been observed adequately in the ED. Pt has been instructed to return to the ED if symptoms do not go away or worsen or change in any way. The patient understands that at this time there is no evidence for a more malignant underlying process, but the patient also understands that early in the process of an illness or injury, an emergency department workup can be falsely reassuring. Routine discharge counseling was given, and the patient understands that worsening, changing or persistent symptoms should prompt an immediate call or follow up with their primary physician or return to the emergency department. The importance of appropriate follow up was also discussed. I have reviewed the disposition diagnosis with the patient and or their family/guardian. I have answered their questions and given discharge instructions. They voiced understanding of these instructions and did not have any further questions or complaints. PROCEDURES:  None    FINAL IMPRESSION      1.  Closed head injury, initial encounter          DISPOSITION/PLAN   DISPOSITION        CONDITION ON DISPOSITION:   Stable    PATIENT REFERRED TO:  Valorie Larose MD  [address]  [phone]    Call in 1 day        DISCHARGE MEDICATIONS:  New Prescriptions    No medications on file       (Please note that portions of this note were completed with a voicerecognition program.  Efforts were made to edit the dictations but occasionally words are mis-transcribed.)    Diallo Sharma MD,, MD, F.A.C.E.P.   Attending Emergency Medicine Physician       Diallo Sharma MD  07/29/21 [id number]

## 2021-08-03 ENCOUNTER — TELEPHONE (OUTPATIENT)
Dept: FAMILY MEDICINE CLINIC | Age: 36
End: 2021-08-03

## 2021-08-03 DIAGNOSIS — M79.18 MYOFASCIAL PAIN: ICD-10-CM

## 2021-08-03 DIAGNOSIS — M54.12 CERVICAL RADICULOPATHY: ICD-10-CM

## 2021-08-04 RX ORDER — PREGABALIN 200 MG/1
200 CAPSULE ORAL 3 TIMES DAILY
Qty: 90 CAPSULE | Refills: 5 | Status: SHIPPED | OUTPATIENT
Start: 2021-08-04 | End: 2022-01-07 | Stop reason: SDUPTHER

## 2021-08-04 RX ORDER — CELECOXIB 200 MG/1
200 CAPSULE ORAL 2 TIMES DAILY
Qty: 60 CAPSULE | Refills: 11 | Status: SHIPPED | OUTPATIENT
Start: 2021-08-04 | End: 2022-01-07 | Stop reason: SDUPTHER

## 2021-08-04 NOTE — TELEPHONE ENCOUNTER
Lyrica, celebrex and diclofenac requested.      Last Appt:  3/29/2021  Next Appt:   8/18/2021  Med verified in [address]

## 2021-08-18 ENCOUNTER — TELEMEDICINE (OUTPATIENT)
Dept: PAIN MANAGEMENT | Age: 36
End: 2021-08-18
Payer: MEDICARE

## 2021-08-18 DIAGNOSIS — M54.2 CERVICALGIA: ICD-10-CM

## 2021-08-18 DIAGNOSIS — M79.18 MYOFASCIAL PAIN: ICD-10-CM

## 2021-08-18 DIAGNOSIS — M48.02 NEUROFORAMINAL STENOSIS OF CERVICAL SPINE: ICD-10-CM

## 2021-08-18 DIAGNOSIS — M54.12 CERVICAL RADICULOPATHY: Primary | ICD-10-CM

## 2021-08-18 DIAGNOSIS — G43.001 MIGRAINE WITHOUT AURA AND WITH STATUS MIGRAINOSUS, NOT INTRACTABLE: ICD-10-CM

## 2021-08-18 PROCEDURE — 99214 OFFICE O/P EST MOD 30 MIN: CPT | Performed by: NURSE PRACTITIONER

## 2021-08-18 PROCEDURE — G8428 CUR MEDS NOT DOCUMENT: HCPCS | Performed by: NURSE PRACTITIONER

## 2021-08-18 PROCEDURE — 99211 OFF/OP EST MAY X REQ PHY/QHP: CPT | Performed by: NURSE PRACTITIONER

## 2021-08-18 RX ORDER — BUTALBITAL, ACETAMINOPHEN AND CAFFEINE 50; 325; 40 MG/1; MG/1; MG/1
1 TABLET ORAL EVERY 4 HOURS PRN
Qty: 60 TABLET | Refills: 3 | Status: SHIPPED | OUTPATIENT
Start: 2021-08-18 | End: 2022-01-07 | Stop reason: SDUPTHER

## 2021-08-18 ASSESSMENT — ENCOUNTER SYMPTOMS
RESPIRATORY NEGATIVE: 1
GASTROINTESTINAL NEGATIVE: 1

## 2022-01-06 ENCOUNTER — PATIENT MESSAGE (OUTPATIENT)
Dept: PAIN MANAGEMENT | Age: 37
End: 2022-01-06

## 2022-01-06 DIAGNOSIS — M79.18 MYOFASCIAL PAIN: ICD-10-CM

## 2022-01-06 DIAGNOSIS — M54.12 CERVICAL RADICULOPATHY: ICD-10-CM

## 2022-01-07 RX ORDER — BUTALBITAL, ACETAMINOPHEN AND CAFFEINE 50; 325; 40 MG/1; MG/1; MG/1
1 TABLET ORAL EVERY 4 HOURS PRN
Qty: 60 TABLET | Refills: 3 | Status: SHIPPED | OUTPATIENT
Start: 2022-01-07 | End: 2022-01-12 | Stop reason: SDUPTHER

## 2022-01-07 RX ORDER — CELECOXIB 200 MG/1
200 CAPSULE ORAL 2 TIMES DAILY
Qty: 60 CAPSULE | Refills: 11 | Status: SHIPPED | OUTPATIENT
Start: 2022-01-07 | End: 2022-01-12 | Stop reason: SDUPTHER

## 2022-01-07 RX ORDER — PREGABALIN 200 MG/1
200 CAPSULE ORAL 3 TIMES DAILY
Qty: 90 CAPSULE | Refills: 5 | Status: SHIPPED | OUTPATIENT
Start: 2022-01-07 | End: 2022-01-12 | Stop reason: SDUPTHER

## 2022-01-07 NOTE — TELEPHONE ENCOUNTER
From: Richard Butterfield  To: Slade Lu  Sent: 1/6/2022 11:54 AM EST  Subject: Medication refill    I have been exposed to covid and been out of work for a while now due to a positive covid test I am in need of medication refills and as soon as I have no symptoms I will be scheduling a visit with u either remotely or in person please refill my meds for me as I am trying to get over this . .it feels like the worst flu I've ever had and without my meds it will be even worse my phone number needs to be updated to [phone]

## 2022-01-11 ENCOUNTER — PATIENT MESSAGE (OUTPATIENT)
Dept: PAIN MANAGEMENT | Age: 37
End: 2022-01-11

## 2022-01-11 DIAGNOSIS — M54.12 CERVICAL RADICULOPATHY: ICD-10-CM

## 2022-01-11 DIAGNOSIS — M79.18 MYOFASCIAL PAIN: ICD-10-CM

## 2022-01-12 RX ORDER — BUTALBITAL, ACETAMINOPHEN AND CAFFEINE 50; 325; 40 MG/1; MG/1; MG/1
1 TABLET ORAL EVERY 4 HOURS PRN
Qty: 60 TABLET | Refills: 3 | Status: SHIPPED | OUTPATIENT
Start: 2022-01-12 | End: 2022-06-09

## 2022-01-12 RX ORDER — PREGABALIN 200 MG/1
200 CAPSULE ORAL 3 TIMES DAILY
Qty: 90 CAPSULE | Refills: 5 | Status: SHIPPED | OUTPATIENT
Start: 2022-01-12 | End: 2022-07-12 | Stop reason: SDUPTHER

## 2022-01-12 RX ORDER — CELECOXIB 200 MG/1
200 CAPSULE ORAL 2 TIMES DAILY
Qty: 60 CAPSULE | Refills: 11 | Status: SHIPPED | OUTPATIENT
Start: 2022-01-12

## 2022-01-12 NOTE — TELEPHONE ENCOUNTER
From: Lamine Avila  To: Britt Velazquez  Sent: 1/11/2022 5:09 PM EST  Subject: Medication refill    [facility] lita [facility]they needed to be sent to [facility] in [place] on [address] they said they can not transfer a new script can u please refill my lyrica and fiorecet and celebrex and send them to the [facility] [address] in [facility] please as I am currently staying in [facility] [place] with my aunt with covid she called to try to make sure its ready when it's time

## 2022-06-09 RX ORDER — BUTALBITAL, ACETAMINOPHEN AND CAFFEINE 50; 325; 40 MG/1; MG/1; MG/1
1 TABLET ORAL EVERY 4 HOURS PRN
Qty: 60 TABLET | Refills: 0 | Status: SHIPPED | OUTPATIENT
Start: 2022-06-09 | End: 2022-07-12 | Stop reason: SDUPTHER

## 2022-07-11 ENCOUNTER — PATIENT MESSAGE (OUTPATIENT)
Dept: PAIN MANAGEMENT | Age: 37
End: 2022-07-11

## 2022-07-11 DIAGNOSIS — M79.18 MYOFASCIAL PAIN: ICD-10-CM

## 2022-07-11 DIAGNOSIS — M54.12 CERVICAL RADICULOPATHY: ICD-10-CM

## 2022-07-12 DIAGNOSIS — M79.18 MYOFASCIAL PAIN: ICD-10-CM

## 2022-07-12 DIAGNOSIS — M54.12 CERVICAL RADICULOPATHY: ICD-10-CM

## 2022-07-12 RX ORDER — PREGABALIN 200 MG/1
200 CAPSULE ORAL 3 TIMES DAILY
Qty: 90 CAPSULE | Refills: 5 | Status: SHIPPED | OUTPATIENT
Start: 2022-07-12 | End: 2022-08-11

## 2022-07-12 RX ORDER — BUTALBITAL, ACETAMINOPHEN AND CAFFEINE 50; 325; 40 MG/1; MG/1; MG/1
1 TABLET ORAL EVERY 4 HOURS PRN
Qty: 60 TABLET | Refills: 0 | Status: SHIPPED | OUTPATIENT
Start: 2022-07-12

## 2022-07-12 RX ORDER — BUTALBITAL, ACETAMINOPHEN AND CAFFEINE 50; 325; 40 MG/1; MG/1; MG/1
1 TABLET ORAL EVERY 4 HOURS PRN
Qty: 60 TABLET | Refills: 0 | Status: SHIPPED | OUTPATIENT
Start: 2022-07-12 | End: 2022-08-30 | Stop reason: SDUPTHER

## 2022-07-12 NOTE — TELEPHONE ENCOUNTER
From: Francia Rivera  To: Dylan Bolus  Sent: 7/11/2022 12:27 PM EDT  Subject: Need medication refilled     Need medication refilled the fiorecet Lyrica I also need to schedule an appointment I have recently moved to [address] [place] [address] and I work til 2 pm it needs to be after 2pm please and my meds can still be sent to the same pharmacy [facility] in [address] [facility]

## 2022-08-01 ENCOUNTER — PATIENT MESSAGE (OUTPATIENT)
Dept: PAIN MANAGEMENT | Age: 37
End: 2022-08-01

## 2022-08-01 NOTE — TELEPHONE ENCOUNTER
From: Yany Reddy  To: Berkley Thakur  Sent: 8/1/2022 1:02 PM EDT  Subject: 8-2-2022    I have an appointment at [facility] that needs to be canceled I have already rescheduled one for later this month I am unable to make it at that time the car is in the shop I have no way to get there it just went in and inorder for medicaid to pay for a ride I have to notify them ahead of time and this just happened I will have my car back by the time of the next scheduled appointment and will be there for sure . .also the pharmacy would not fill the fioricet at time it was due because of a recall I'm not sure how long I'm supposed to wait for them to get new stock but my head hurts and they help can someone please check on this if I.  Ant get that med can u see if Dr. Sugey Shaw prescribe something like it so I will be able to work with no pain Thank you please get back with me

## 2022-08-03 RX ORDER — KETOROLAC TROMETHAMINE 10 MG/1
10 TABLET, FILM COATED ORAL EVERY 6 HOURS PRN
Qty: 20 TABLET | Refills: 0 | Status: SHIPPED | OUTPATIENT
Start: 2022-08-03 | End: 2023-08-03

## 2022-08-30 RX ORDER — BUTALBITAL, ACETAMINOPHEN AND CAFFEINE 50; 325; 40 MG/1; MG/1; MG/1
1 TABLET ORAL EVERY 4 HOURS PRN
Qty: 60 TABLET | Refills: 1 | Status: SHIPPED | OUTPATIENT
Start: 2022-08-30

## 2022-08-30 NOTE — TELEPHONE ENCOUNTER
Last Appt:  8/2/2022  Next Appt:   9/8/2022  Med verified in [id number] refills requested by [facility] in [place].

## 2022-09-14 ENCOUNTER — TELEPHONE (OUTPATIENT)
Dept: PAIN MANAGEMENT | Age: 37
End: 2022-09-14

## 2022-09-14 NOTE — TELEPHONE ENCOUNTER
The patient No Showed for the 3rd time today 9/14/22. The other appointments were [facility] on 10/7/21 and 9/20/22. Please advise if term should be completed.

## 2022-09-16 NOTE — TELEPHONE ENCOUNTER
Per Saumya Miguel, the patient can be scheduled again. Writer will send No Show letter with reminder.

## 2023-01-03 RX ORDER — BUTALBITAL, ACETAMINOPHEN AND CAFFEINE 50; 325; 40 MG/1; MG/1; MG/1
TABLET ORAL
Qty: 60 TABLET | Refills: 0 | OUTPATIENT
Start: 2023-01-03

## 2023-01-03 NOTE — TELEPHONE ENCOUNTER
Voltaren gel 1%  Last Appt:  8/18/2021  Next Appt:   Visit date not found  Med verified in [address]

## 2023-01-29 DIAGNOSIS — M54.12 CERVICAL RADICULOPATHY: ICD-10-CM

## 2023-01-29 DIAGNOSIS — M79.18 MYOFASCIAL PAIN: ICD-10-CM

## 2023-01-30 RX ORDER — PREGABALIN 200 MG/1
CAPSULE ORAL
Qty: 90 CAPSULE | Refills: 0 | Status: SHIPPED | OUTPATIENT
Start: 2023-01-30 | End: 2023-03-01

## 2023-01-30 NOTE — TELEPHONE ENCOUNTER
Francis Gayle  called requesting a refill of the below medication which has been pended for you:     Requested Prescriptions     Pending Prescriptions Disp Refills    pregabalin (LYRICA) 200 MG capsule [Pharmacy Med Name: Pregabalin 200 MG Oral Capsule] 90 capsule 0     Sig: TAKE 1 CAPSULE BY MOUTH THREE TIMES DAILY       Allergies   Allergen Reactions    Norco [Hydrocodone-Acetaminophen] Other (See Comments)     Patient stated it gave her severe headaches               Last Appointment:  8/2/2022   Next Appointment:  Visit date not found

## 2023-01-31 ENCOUNTER — TELEPHONE (OUTPATIENT)
Dept: FAMILY MEDICINE CLINIC | Age: 38
End: 2023-01-31

## 2023-01-31 NOTE — TELEPHONE ENCOUNTER
Pt calling stating she was seen at [facility] ER and told to follow up with PCP in 2 days, pt was seen for flu like symptoms, tested for covid had a chest xray etc, called to get records but they need a release faxed first, release completed and faxed to [employer] informed pt not sure if we can see her in 2 days due to the nature of her infectious process, will check with LK and see how she wants to proceed and then contact pt.

## 2023-02-12 ENCOUNTER — OFFICE VISIT (OUTPATIENT)
Dept: PRIMARY CARE CLINIC | Age: 38
End: 2023-02-12

## 2023-02-12 VITALS
HEART RATE: 92 BPM | WEIGHT: 219 LBS | DIASTOLIC BLOOD PRESSURE: 78 MMHG | SYSTOLIC BLOOD PRESSURE: 118 MMHG | BODY MASS INDEX: 38.8 KG/M2 | HEIGHT: 63 IN | OXYGEN SATURATION: 95 %

## 2023-02-12 DIAGNOSIS — J45.20 MILD INTERMITTENT ASTHMA WITHOUT COMPLICATION: ICD-10-CM

## 2023-02-12 DIAGNOSIS — J45.909 MODERATE ASTHMA, UNSPECIFIED WHETHER COMPLICATED, UNSPECIFIED WHETHER PERSISTENT: ICD-10-CM

## 2023-02-12 DIAGNOSIS — J20.9 ACUTE BRONCHITIS, UNSPECIFIED ORGANISM: ICD-10-CM

## 2023-02-12 DIAGNOSIS — J20.9 ACUTE BRONCHITIS WITH BRONCHOSPASM: Primary | ICD-10-CM

## 2023-02-12 RX ORDER — PREDNISONE 50 MG/1
50 TABLET ORAL DAILY
Qty: 5 TABLET | Refills: 0 | Status: SHIPPED | OUTPATIENT
Start: 2023-02-12 | End: 2023-02-17

## 2023-02-12 RX ORDER — AZITHROMYCIN 250 MG/1
250 TABLET, FILM COATED ORAL SEE ADMIN INSTRUCTIONS
Qty: 6 TABLET | Refills: 0 | Status: SHIPPED | OUTPATIENT
Start: 2023-02-12 | End: 2023-02-17

## 2023-02-12 RX ORDER — FLUTICASONE PROPIONATE 44 UG/1
2 AEROSOL, METERED RESPIRATORY (INHALATION) 2 TIMES DAILY
Qty: 1 EACH | Refills: 3 | Status: SHIPPED | OUTPATIENT
Start: 2023-02-12

## 2023-02-12 RX ORDER — ALBUTEROL SULFATE 90 UG/1
2 AEROSOL, METERED RESPIRATORY (INHALATION) EVERY 4 HOURS PRN
Qty: 1 EACH | Refills: 5 | Status: SHIPPED | OUTPATIENT
Start: 2023-02-12

## 2023-02-12 ASSESSMENT — ENCOUNTER SYMPTOMS
SHORTNESS OF BREATH: 1
GASTROINTESTINAL NEGATIVE: 1
COUGH: 1
EYES NEGATIVE: 1
WHEEZING: 1

## 2023-02-12 NOTE — PROGRESS NOTES
Subjective:      Patient ID: Merlin Lapine is a 45 y.o. female. HPI  acute urgent care visit for cough with some sob and wheezing. Feeling more and more sob. Treated with antibiotic last week, feeling better after a couple of days, but symptoms recurrent. Started about a week ago with a sore ear and sore throat. She went to [place] [address] and given augmentin. Tested negative for covid and flu. Not using flovent, out of this. Using albuterol prn   Past Medical History:   Diagnosis Date    Allergic rhinitis     Anemia     Asthma     Depression     Headache     Herniated intervertebral disc of lumbar spine     Shortness of breath     Tobacco abuse      Ongoing tobacco smoking.      Past Surgical History:   Procedure Laterality Date     SECTION      PAIN MANAGEMENT PROCEDURE Left 2020    Left C7 TRANSFORAMINAL performed by Loretta Calderon MD at [address] [place] [address] Left 2020    Left C7 TRANSFORAMINAL performed by Loretta Calderon MD at [facility] Left 2021    C7 T1 Interlaminar Epidural Steroid Injection performed by Loretta Calderon MD at [address]      TYMPANOSTOMY TUBE PLACEMENT          Current Outpatient Medications   Medication Sig Dispense Refill    pregabalin (LYRICA) 200 MG capsule TAKE 1 CAPSULE BY MOUTH THREE TIMES DAILY 90 capsule 0    diclofenac sodium (VOLTAREN) 1 % GEL APPLY 2 GRAMS TOPICALLY FOUR TIMES DAILY 200 g 0    butalbital-acetaminophen-caffeine (FIORICET, ESGIC) -40 MG per tablet Take 1 tablet by mouth every 4 hours as needed for Headaches or Migraine 60 tablet 1    butalbital-acetaminophen-caffeine (FIORICET, ESGIC) -40 MG per tablet Take 1 tablet by mouth every 4 hours as needed for Headaches or Migraine 60 tablet 0    diclofenac sodium (VOLTAREN) 1 % GEL Apply 2 g topically 4 times daily 5 Tube 11    albuterol (PROVENTIL) (2.5 MG/3ML) 0.083% nebulizer solution Take 3 mLs by nebulization every 4 hours as needed for Wheezing 120 each 0    albuterol sulfate HFA (VENTOLIN HFA) 108 (90 Base) MCG/ACT inhaler Inhale 2 puffs into the lungs every 4 hours as needed for Wheezing ventolin 1 Inhaler 5    fluticasone (FLOVENT HFA) 44 MCG/ACT inhaler Inhale 2 puffs into the lungs 2 times daily 1 Inhaler 3    Multiple Vitamins-Minerals (MULTIVITAMIN WOMEN PO) Take 1 tablet by mouth daily      ketorolac (TORADOL) 10 MG tablet Take 1 tablet by mouth every 6 hours as needed for Pain (Patient not taking: Reported on 2/12/2023) 20 tablet 0    pregabalin (LYRICA) 200 MG capsule Take 1 capsule by mouth 3 times daily for 30 days. 90 capsule 5    celecoxib (CELEBREX) 200 MG capsule Take 1 capsule by mouth 2 times daily (Patient not taking: Reported on 2/12/2023) 60 capsule 11    ziprasidone (GEODON) 40 MG capsule Take 1 capsule by mouth 2 times daily (with meals) (Patient not taking: Reported on 2/12/2023) 60 capsule 0    fluticasone (FLONASE) 50 MCG/ACT nasal spray SHAKE LIQUID AND USE 2 SPRAYS IN EACH NOSTRIL DAILY (Patient not taking: Reported on 2/12/2023) 16 g 3    mirtazapine (REMERON) 7.5 MG tablet Take 7.5 mg by mouth nightly (Patient not taking: Reported on 2/12/2023)      prazosin (MINIPRESS) 1 MG capsule Take 2 mg by mouth nightly  (Patient not taking: Reported on 2/12/2023)      hydrOXYzine (VISTARIL) 50 MG capsule Take 100 mg by mouth 3 times daily as needed for Anxiety  (Patient not taking: Reported on 2/12/2023)       No current facility-administered medications for this visit. Allergies   Allergen Reactions    Norco [Hydrocodone-Acetaminophen] Other (See Comments)     Patient stated it gave her severe headaches         Review of Systems   Constitutional: Negative. Negative for fever. HENT: Negative. Eyes: Negative. Respiratory:  Positive for cough, shortness of breath and wheezing. Cardiovascular: Negative. Gastrointestinal: Negative. Genitourinary: Negative. Musculoskeletal: Negative. Skin: Negative. Neurological: Negative. Psychiatric/Behavioral: Negative. Objective:   Physical Exam  Constitutional:       General: She is in acute distress. Appearance: She is well-developed. She is not toxic-appearing. HENT:      Head: Normocephalic and atraumatic. Right Ear: External ear normal.      Left Ear: External ear normal.      Nose: Congestion present. Mouth/Throat:      Pharynx: No oropharyngeal exudate. Eyes:      General: No scleral icterus. Conjunctiva/sclera: Conjunctivae normal.   Neck:      Thyroid: No thyromegaly. Cardiovascular:      Rate and Rhythm: Normal rate and regular rhythm. Heart sounds: Normal heart sounds. No murmur heard. Pulmonary:      Effort: Respiratory distress present. Breath sounds: Wheezing and rhonchi present. Abdominal:      General: Bowel sounds are normal. There is no distension. Palpations: Abdomen is soft. Tenderness: There is no abdominal tenderness. There is no rebound. Musculoskeletal:         General: No tenderness. Normal range of motion. Cervical back: Neck supple. Skin:     General: Skin is warm and dry. Findings: No erythema or rash. Neurological:      Mental Status: She is alert and oriented to person, place, and time. Psychiatric:         Behavior: Behavior normal.         Thought Content: Thought content normal.         Judgment: Judgment normal.     /78   Pulse 92   Ht 5' 3\" (1.6 m)   Wt 219 lb (99.3 kg)   SpO2 95%   BMI 38.79 kg/m²     Assessment:      Encounter Diagnoses   Name Primary? Acute bronchitis with bronchospasm Yes    Moderate asthma, unspecified whether complicated, unspecified whether persistent      Acute uri likely the source. Smoking contributing to airway irritation. Inhalation and exp. Noise heard over both lung fields. Plan:    Levalbuterol nebulizer treatment. D/c smoking. Refilling flovent inhaler to restart  Refilling albuterol inhaler to cont. Q 6 hrs. Adding prednisone 50mg/day x 5 days. Rec. Pulse oximeter. Zpak x 5 days. Reports recurrent fever last night  Recheck prn concerns for worsening sob, hypoxia/wheezing.               Zack Sheridan MD

## 2023-02-12 NOTE — LETTER
[address] [facility] A department of [place] [address]  Phone: [phone]  Fax: [phone]        Jaskaran Anton MD      February 12, 2023    Patient:   Cody Whelan  Date of Birth   1985  Date of visit   2/12/2023        To Whom it May Concern:      Cody Whelan was seen in my clinic on 2/12/2023. Please excuse from work 2/9-2/14/23. If you have any questions or concerns, please don't hesitate to call.       Sincerely,      Jaskaran Anton MD/[facility]

## 2023-02-15 ENCOUNTER — APPOINTMENT (OUTPATIENT)
Dept: CT IMAGING | Age: 38
End: 2023-02-15
Payer: MEDICAID

## 2023-02-15 ENCOUNTER — HOSPITAL ENCOUNTER (EMERGENCY)
Age: 38
Discharge: ANOTHER HOSPITAL | End: 2023-02-16
Attending: EMERGENCY MEDICINE
Payer: MEDICAID

## 2023-02-15 DIAGNOSIS — R09.02 HYPOXIA: ICD-10-CM

## 2023-02-15 DIAGNOSIS — J18.9 MULTIFOCAL PNEUMONIA: Primary | ICD-10-CM

## 2023-02-15 DIAGNOSIS — J45.901 EXACERBATION OF ASTHMA, UNSPECIFIED ASTHMA SEVERITY, UNSPECIFIED WHETHER PERSISTENT: ICD-10-CM

## 2023-02-15 LAB
ABSOLUTE EOS #: 0.08 K/UL (ref 0–0.44)
ABSOLUTE IMMATURE GRANULOCYTE: 0.1 K/UL (ref 0–0.3)
ABSOLUTE LYMPH #: 1.7 K/UL (ref 1.1–3.7)
ABSOLUTE MONO #: 0.27 K/UL (ref 0.1–1.2)
ANION GAP SERPL CALCULATED.3IONS-SCNC: 12 MMOL/L (ref 9–17)
BACTERIA: ABNORMAL
BASOPHILS # BLD: 0 % (ref 0–2)
BASOPHILS ABSOLUTE: 0.07 K/UL (ref 0–0.2)
BILIRUBIN URINE: NEGATIVE
BNP SERPL-MCNC: 242 PG/ML
BUN SERPL-MCNC: 17 MG/DL (ref 6–20)
BUN/CREAT BLD: 20 (ref 9–20)
CALCIUM SERPL-MCNC: 8.6 MG/DL (ref 8.6–10.4)
CHLORIDE SERPL-SCNC: 103 MMOL/L (ref 98–107)
CO2 SERPL-SCNC: 25 MMOL/L (ref 20–31)
COLOR: YELLOW
CREAT SERPL-MCNC: 0.83 MG/DL (ref 0.5–0.9)
D DIMER BLD IA.RAPID-MCNC: 0.48 MG/L FEU (ref 0–0.59)
EOSINOPHILS RELATIVE PERCENT: 1 % (ref 1–4)
EPITHELIAL CELLS UA: ABNORMAL /HPF (ref 0–5)
FLUAV AG SPEC QL: NEGATIVE
FLUBV AG SPEC QL: NEGATIVE
GFR SERPL CREATININE-BSD FRML MDRD: >60 ML/MIN/1.73M2
GLUCOSE SERPL-MCNC: 118 MG/DL (ref 70–99)
GLUCOSE UR STRIP.AUTO-MCNC: NEGATIVE MG/DL
HCG(URINE) PREGNANCY TEST: NEGATIVE
HCT VFR BLD AUTO: 41 % (ref 36.3–47.1)
HGB BLD-MCNC: 13.5 G/DL (ref 11.9–15.1)
IMMATURE GRANULOCYTES: 1 %
KETONES UR STRIP.AUTO-MCNC: ABNORMAL MG/DL
LEUKOCYTE ESTERASE UR QL STRIP.AUTO: NEGATIVE
LYMPHOCYTES # BLD: 11 % (ref 24–43)
MCH RBC QN AUTO: 29.5 PG (ref 25.2–33.5)
MCHC RBC AUTO-ENTMCNC: 32.9 G/DL (ref 25.2–33.5)
MCV RBC AUTO: 89.5 FL (ref 82.6–102.9)
MONOCYTES # BLD: 2 % (ref 3–12)
MUCUS: ABNORMAL
NITRITE UR QL STRIP.AUTO: NEGATIVE
NRBC AUTOMATED: 0 PER 100 WBC
OTHER OBSERVATIONS UA: ABNORMAL
PDW BLD-RTO: 14.5 % (ref 11.8–14.4)
PLATELET # BLD AUTO: 380 K/UL (ref 138–453)
PMV BLD AUTO: 9.3 FL (ref 8.1–13.5)
POTASSIUM SERPL-SCNC: 4 MMOL/L (ref 3.7–5.3)
PROT UR STRIP.AUTO-MCNC: 5.5 MG/DL (ref 5–6)
PROT UR STRIP.AUTO-MCNC: NEGATIVE MG/DL
RBC # BLD: 4.58 M/UL (ref 3.95–5.11)
RBC # BLD: ABNORMAL 10*6/UL
RBC CLUMPS #/AREA URNS AUTO: ABNORMAL /HPF (ref 0–4)
SARS-COV-2 RDRP RESP QL NAA+PROBE: NOT DETECTED
SEG NEUTROPHILS: 85 % (ref 36–65)
SEGMENTED NEUTROPHILS ABSOLUTE COUNT: 13.68 K/UL (ref 1.5–8.1)
SODIUM SERPL-SCNC: 140 MMOL/L (ref 135–144)
SPECIFIC GRAVITY UA: 1.03 (ref 1.01–1.02)
SPECIMEN DESCRIPTION: NORMAL
TROPONIN I SERPL DL<=0.01 NG/ML-MCNC: <6 NG/L (ref 0–14)
TURBIDITY: CLEAR
URINE HGB: ABNORMAL
UROBILINOGEN, URINE: NORMAL
WBC # BLD AUTO: 15.9 K/UL (ref 3.5–11.3)
WBC UA: ABNORMAL /HPF (ref 0–4)

## 2023-02-15 PROCEDURE — 87040 BLOOD CULTURE FOR BACTERIA: CPT

## 2023-02-15 PROCEDURE — 83605 ASSAY OF LACTIC ACID: CPT

## 2023-02-15 PROCEDURE — 93005 ELECTROCARDIOGRAM TRACING: CPT | Performed by: EMERGENCY MEDICINE

## 2023-02-15 PROCEDURE — 84484 ASSAY OF TROPONIN QUANT: CPT

## 2023-02-15 PROCEDURE — 96365 THER/PROPH/DIAG IV INF INIT: CPT

## 2023-02-15 PROCEDURE — 99285 EMERGENCY DEPT VISIT HI MDM: CPT

## 2023-02-15 PROCEDURE — 36415 COLL VENOUS BLD VENIPUNCTURE: CPT

## 2023-02-15 PROCEDURE — 85379 FIBRIN DEGRADATION QUANT: CPT

## 2023-02-15 PROCEDURE — 6360000002 HC RX W HCPCS: Performed by: EMERGENCY MEDICINE

## 2023-02-15 PROCEDURE — 80048 BASIC METABOLIC PNL TOTAL CA: CPT

## 2023-02-15 PROCEDURE — 83880 ASSAY OF NATRIURETIC PEPTIDE: CPT

## 2023-02-15 PROCEDURE — 6370000000 HC RX 637 (ALT 250 FOR IP): Performed by: EMERGENCY MEDICINE

## 2023-02-15 PROCEDURE — 81025 URINE PREGNANCY TEST: CPT

## 2023-02-15 PROCEDURE — 2580000003 HC RX 258: Performed by: EMERGENCY MEDICINE

## 2023-02-15 PROCEDURE — 94640 AIRWAY INHALATION TREATMENT: CPT

## 2023-02-15 PROCEDURE — 87804 INFLUENZA ASSAY W/OPTIC: CPT

## 2023-02-15 PROCEDURE — 85025 COMPLETE CBC W/AUTO DIFF WBC: CPT

## 2023-02-15 PROCEDURE — 87635 SARS-COV-2 COVID-19 AMP PRB: CPT

## 2023-02-15 PROCEDURE — 96375 TX/PRO/DX INJ NEW DRUG ADDON: CPT

## 2023-02-15 PROCEDURE — 81001 URINALYSIS AUTO W/SCOPE: CPT

## 2023-02-15 PROCEDURE — 96374 THER/PROPH/DIAG INJ IV PUSH: CPT

## 2023-02-15 PROCEDURE — 71260 CT THORAX DX C+: CPT | Performed by: EMERGENCY MEDICINE

## 2023-02-15 PROCEDURE — 2709999900 CT CHEST PULMONARY EMBOLISM W CONTRAST

## 2023-02-15 PROCEDURE — 6360000004 HC RX CONTRAST MEDICATION: Performed by: EMERGENCY MEDICINE

## 2023-02-15 RX ORDER — IPRATROPIUM BROMIDE AND ALBUTEROL SULFATE 2.5; .5 MG/3ML; MG/3ML
1 SOLUTION RESPIRATORY (INHALATION) ONCE
Status: DISCONTINUED | OUTPATIENT
Start: 2023-02-15 | End: 2023-02-15

## 2023-02-15 RX ORDER — ONDANSETRON 2 MG/ML
4 INJECTION INTRAMUSCULAR; INTRAVENOUS ONCE
Status: COMPLETED | OUTPATIENT
Start: 2023-02-15 | End: 2023-02-15

## 2023-02-15 RX ORDER — ALBUTEROL SULFATE 2.5 MG/3ML
2.5 SOLUTION RESPIRATORY (INHALATION) ONCE
Status: COMPLETED | OUTPATIENT
Start: 2023-02-15 | End: 2023-02-15

## 2023-02-15 RX ORDER — METHYLPREDNISOLONE SODIUM SUCCINATE 125 MG/2ML
125 INJECTION, POWDER, LYOPHILIZED, FOR SOLUTION INTRAMUSCULAR; INTRAVENOUS ONCE
Status: COMPLETED | OUTPATIENT
Start: 2023-02-15 | End: 2023-02-15

## 2023-02-15 RX ORDER — IPRATROPIUM BROMIDE AND ALBUTEROL SULFATE 2.5; .5 MG/3ML; MG/3ML
1 SOLUTION RESPIRATORY (INHALATION) ONCE
Status: COMPLETED | OUTPATIENT
Start: 2023-02-15 | End: 2023-02-15

## 2023-02-15 RX ORDER — ASPIRIN 81 MG/1
324 TABLET, CHEWABLE ORAL ONCE
Status: COMPLETED | OUTPATIENT
Start: 2023-02-15 | End: 2023-02-15

## 2023-02-15 RX ORDER — 0.9 % SODIUM CHLORIDE 0.9 %
1000 INTRAVENOUS SOLUTION INTRAVENOUS ONCE
Status: COMPLETED | OUTPATIENT
Start: 2023-02-15 | End: 2023-02-15

## 2023-02-15 RX ORDER — 0.9 % SODIUM CHLORIDE 0.9 %
1000 INTRAVENOUS SOLUTION INTRAVENOUS ONCE
Status: COMPLETED | OUTPATIENT
Start: 2023-02-15 | End: 2023-02-16

## 2023-02-15 RX ADMIN — METHYLPREDNISOLONE SODIUM SUCCINATE 125 MG: 125 INJECTION, POWDER, FOR SOLUTION INTRAMUSCULAR; INTRAVENOUS at 20:07

## 2023-02-15 RX ADMIN — SODIUM CHLORIDE 1000 ML: 9 INJECTION, SOLUTION INTRAVENOUS at 21:36

## 2023-02-15 RX ADMIN — IOPAMIDOL 100 ML: 755 INJECTION, SOLUTION INTRAVENOUS at 21:53

## 2023-02-15 RX ADMIN — IPRATROPIUM BROMIDE AND ALBUTEROL SULFATE 1 AMPULE: 2.5; .5 SOLUTION RESPIRATORY (INHALATION) at 21:20

## 2023-02-15 RX ADMIN — IPRATROPIUM BROMIDE AND ALBUTEROL SULFATE 1 AMPULE: 2.5; .5 SOLUTION RESPIRATORY (INHALATION) at 20:10

## 2023-02-15 RX ADMIN — ALBUTEROL SULFATE 2.5 MG: 2.5 SOLUTION RESPIRATORY (INHALATION) at 21:19

## 2023-02-15 RX ADMIN — ASPIRIN 81 MG 324 MG: 81 TABLET ORAL at 20:07

## 2023-02-15 RX ADMIN — SODIUM CHLORIDE 1000 ML: 9 INJECTION, SOLUTION INTRAVENOUS at 20:05

## 2023-02-15 RX ADMIN — ONDANSETRON 4 MG: 2 INJECTION INTRAMUSCULAR; INTRAVENOUS at 21:34

## 2023-02-15 ASSESSMENT — LIFESTYLE VARIABLES
HOW OFTEN DO YOU HAVE A DRINK CONTAINING ALCOHOL: NEVER
HOW MANY STANDARD DRINKS CONTAINING ALCOHOL DO YOU HAVE ON A TYPICAL DAY: PATIENT DOES NOT DRINK

## 2023-02-16 VITALS
HEART RATE: 79 BPM | TEMPERATURE: 97 F | DIASTOLIC BLOOD PRESSURE: 60 MMHG | RESPIRATION RATE: 21 BRPM | OXYGEN SATURATION: 95 % | SYSTOLIC BLOOD PRESSURE: 103 MMHG

## 2023-02-16 LAB — LACTATE PLASV-SCNC: <0.2 MMOL/L (ref 0.5–2.2)

## 2023-02-16 PROCEDURE — 2580000003 HC RX 258: Performed by: EMERGENCY MEDICINE

## 2023-02-16 PROCEDURE — 6360000002 HC RX W HCPCS: Performed by: EMERGENCY MEDICINE

## 2023-02-16 RX ADMIN — AZITHROMYCIN DIHYDRATE 500 MG: 500 INJECTION, POWDER, LYOPHILIZED, FOR SOLUTION INTRAVENOUS at 00:43

## 2023-02-16 RX ADMIN — CEFTRIAXONE 1000 MG: 1 INJECTION, POWDER, FOR SOLUTION INTRAMUSCULAR; INTRAVENOUS at 00:02

## 2023-02-16 ASSESSMENT — ENCOUNTER SYMPTOMS
ABDOMINAL PAIN: 0
COUGH: 1
SHORTNESS OF BREATH: 1
WHEEZING: 1

## 2023-02-16 NOTE — ED NOTES
Call to house supervisor at [facility] to give ETA. ETA given, states okay to send patient over there. No further questions or concerns.      Vale Joseph RN  02/16/23 1984

## 2023-02-16 NOTE — ED PROVIDER NOTES
[facility] ED  [address]  [place] [address]  Phone: [phone]  eMERGENCY dEPARTMENT eNCOUnter      Pt Name: Richard Oneil  MRN: [id number]  Marilingfbarry 1985  Date of evaluation: 23      CHIEF COMPLAINT     Chief Complaint   Patient presents with    Shortness of Breath    Chest Pain    Cough       HISTORY OF PRESENT ILLNESS    Richard Oneil is a 45 y.o. female who presents to the emergency department after being referred by urgent care. The patient states that she got sick 2 weeks ago. She went to urgent care 1 week ago. She was placed on a Z-Miguel and prednisone per her report. She is also taking albuterol as she has a history of asthma. She has chest tightness and pressure and shortness of breath. She also feels lightheaded. Had not noticed a fever. Has not previously required hospitalization for her asthma. No previous history of DVT or PE.    REVIEW OF SYSTEMS     Review of Systems   Constitutional:  Positive for fatigue. Eyes:  Negative for visual disturbance. Respiratory:  Positive for cough, shortness of breath and wheezing. Cardiovascular:  Positive for chest pain. Negative for leg swelling. Gastrointestinal:  Negative for abdominal pain. Genitourinary:  Negative for dysuria. Skin:  Negative for rash. Neurological:  Negative for syncope. Psychiatric/Behavioral:  Negative for confusion. All other systems reviewed and are negative. PAST MEDICAL HISTORY    has a past medical history of Allergic rhinitis, Anemia, Asthma, Depression, Headache, Herniated intervertebral disc of lumbar spine, Shortness of breath, and Tobacco abuse. SURGICAL HISTORY      has a past surgical history that includes Tonsillectomy and adenoidectomy (); Tympanostomy tube placement ();  section (); Tubal ligation (); Pain management procedure (Left, 2020);  Pain management procedure (Left, 2020); and Pain management procedure (Left, 2/5/2021). CURRENT MEDICATIONS       Previous Medications    ALBUTEROL (PROVENTIL) (2.5 MG/3ML) 0.083% NEBULIZER SOLUTION    Take 3 mLs by nebulization every 4 hours as needed for Wheezing    ALBUTEROL SULFATE HFA (VENTOLIN HFA) 108 (90 BASE) MCG/ACT INHALER    Inhale 2 puffs into the lungs every 4 hours as needed for Wheezing ventolin    AZITHROMYCIN (ZITHROMAX) 250 MG TABLET    Take 1 tablet by mouth See Admin Instructions for 5 days 500mg on day 1 followed by 250mg on days 2 - 5    BUTALBITAL-ACETAMINOPHEN-CAFFEINE (FIORICET, ESGIC) -40 MG PER TABLET    Take 1 tablet by mouth every 4 hours as needed for Headaches or Migraine    BUTALBITAL-ACETAMINOPHEN-CAFFEINE (FIORICET, ESGIC) -40 MG PER TABLET    Take 1 tablet by mouth every 4 hours as needed for Headaches or Migraine    CELECOXIB (CELEBREX) 200 MG CAPSULE    Take 1 capsule by mouth 2 times daily    DICLOFENAC SODIUM (VOLTAREN) 1 % GEL    Apply 2 g topically 4 times daily    DICLOFENAC SODIUM (VOLTAREN) 1 % GEL    APPLY 2 GRAMS TOPICALLY FOUR TIMES DAILY    FLUTICASONE (FLONASE) 50 MCG/ACT NASAL SPRAY    SHAKE LIQUID AND USE 2 SPRAYS IN EACH NOSTRIL DAILY    FLUTICASONE (FLOVENT HFA) 44 MCG/ACT INHALER    Inhale 2 puffs into the lungs 2 times daily    HYDROXYZINE (VISTARIL) 50 MG CAPSULE    Take 100 mg by mouth 3 times daily as needed for Anxiety    KETOROLAC (TORADOL) 10 MG TABLET    Take 1 tablet by mouth every 6 hours as needed for Pain    MIRTAZAPINE (REMERON) 7.5 MG TABLET    Take 7.5 mg by mouth nightly    MULTIPLE VITAMINS-MINERALS (MULTIVITAMIN WOMEN PO)    Take 1 tablet by mouth daily    PRAZOSIN (MINIPRESS) 1 MG CAPSULE    Take 2 mg by mouth nightly    PREDNISONE (DELTASONE) 50 MG TABLET    Take 1 tablet by mouth daily for 5 days    PREGABALIN (LYRICA) 200 MG CAPSULE    Take 1 capsule by mouth 3 times daily for 30 days.     PREGABALIN (LYRICA) 200 MG CAPSULE    TAKE 1 CAPSULE BY MOUTH THREE TIMES DAILY    ZIPRASIDONE (GEODON) 40 MG CAPSULE    Take 1 capsule by mouth 2 times daily (with meals)       ALLERGIES     is allergic to norco [hydrocodone-acetaminophen]. FAMILY HISTORY     She indicated that her mother is . She indicated that her father is alive. She indicated that all of her three sisters are alive. She indicated that the status of her maternal grandmother is unknown. She indicated that only one of her three others is alive. family history includes Bleeding Prob in an other family member; Diabetes in an other family member; Early Death in some other family members; Early Death (age of onset: 45) in her mother; Heart Disease in her father and mother; High Blood Pressure in her father; Kidney Disease in her mother; Isamar Glaze in her maternal grandmother; Other in her father, mother, and other family members; Thyroid Disease in an other family member. SOCIAL HISTORY      reports that she has been smoking e-cigarettes and cigarettes. She started smoking about 19 years ago. She has a 5.00 pack-year smoking history. She has never used smokeless tobacco. She reports current alcohol use. She reports current drug use. Drug: Marijuana Valdene Ragland). PHYSICAL EXAM     INITIAL VITALS:  tympanic temperature is 97 °F (36.1 °C). Her blood pressure is 103/60 and her pulse is 79. Her respiration is 21 and oxygen saturation is 95%. Physical Exam  Vitals reviewed. Constitutional:       General: She is not in acute distress. Appearance: She is well-developed. She is ill-appearing. She is not toxic-appearing. HENT:      Head: Normocephalic and atraumatic. Eyes:      Extraocular Movements: Extraocular movements intact. Pupils: Pupils are equal, round, and reactive to light. Cardiovascular:      Rate and Rhythm: Normal rate and regular rhythm. Heart sounds: No murmur heard. Pulmonary:      Effort: Pulmonary effort is normal.      Breath sounds: Examination of the right-upper field reveals wheezing.  Examination of the left-upper field reveals wheezing. Examination of the right-middle field reveals wheezing. Examination of the left-middle field reveals wheezing. Examination of the right-lower field reveals wheezing and rhonchi. Examination of the left-lower field reveals wheezing and rhonchi. Wheezing and rhonchi present. Comments: End expiratory coarseness and wheezing. Occasional rhonchi in the bases. Chest:      Chest wall: No tenderness. Abdominal:      Palpations: Abdomen is soft. Tenderness: There is no abdominal tenderness. Musculoskeletal:      Right lower leg: No edema. Left lower leg: No edema. Skin:     General: Skin is warm and dry. Capillary Refill: Capillary refill takes less than 2 seconds. Findings: No rash. Neurological:      General: No focal deficit present. Mental Status: She is alert and oriented to person, place, and time. Psychiatric:         Mood and Affect: Mood normal.         Behavior: Behavior normal.     DIFFERENTIAL DIAGNOSIS / MDM / EMERGENCY DEPARTMENT COURSE:     Patient is found to be hypoxic. We will treat her for asthma exacerbation including DuoNebs and IV Solu-Medrol as well as IV fluids. EKG did not show any acute findings. Troponin is negative. D-dimer is normal but approaching the upper limit and given that the patient has hypoxia we discussed the risks and benefits CT scan to rule out pulmonary embolism. Patient was agreeable to proceeding with the test which I think is prudent given her hypoxia. They did not see any central pulmonary embolism but she did have a findings consistent with multifocal pneumonia. At this point patient has failed outpatient management with steroids and antibiotics and has underlying asthma and is currently requiring oxygen therefore I have recommended that she be hospitalized for treatment of this pneumonia.   Patient needed time to consider the admission and subsequently requested to be transferred to [facility] as she has family member that works there. They were able to accommodate the patient I discussed the case with Dr. Walker Ricketts who accepted the patient to his service. Blood cultures were drawn as was a lactic acid and patient was started on IV Rocephin and Zithromax. She is not tachypneic at this point and is saturating well on supplemental oxygen without evidence of impending respiratory failure. I have reviewed the disposition diagnosis with the patient and or their family/guardian. I have answered their questions and givendischarge instructions. They voiced understanding of these instructions and did not have any further questions or complaints. DIAGNOSTIC RESULTS     EKG: All EKG's are interpreted by the Emergency Department Physician who either signs or Co-signs this chart inthe absence of a cardiologist.    Twelve-lead EKG timed 19: 54 normal sinus rhythm 85 bpm.  Normal intervals and axis. No acute ST elevations depressions or T wave inversion interpretation is a normal twelve-lead EKG. QTc 449 ms. RADIOLOGY:   Radiologist interpretation of radiologic studies:     Scan to rule out pulmonary CHEST PULMONARY EMBOLISM W CONTRAST    Result Date: 2/15/2023  EXAMINATION: CTA OF THE CHEST 2/15/2023 9:45 pm TECHNIQUE: CTA of the chest was performed after the administration of intravenous contrast.  Multiplanar reformatted images are provided for review. MIP images are provided for review. Automated exposure control, iterative reconstruction, and/or weight based adjustment of the mA/kV was utilized to reduce the radiation dose to as low as reasonably achievable. COMPARISON: None.  HISTORY: ORDERING SYSTEM PROVIDED HISTORY: hypoxia TECHNOLOGIST PROVIDED HISTORY: hypoxia Decision Support Exception - unselect if not a suspected or confirmed emergency medical condition->Emergency Medical Condition (MA) Reason for Exam: SOB, chest pain cough for 2 weeks, hx of asthma, smoker FINDINGS: Pulmonary Arteries: Pulmonary arteries are not adequately opacified for evaluation. No filling defect within the main pulmonary arteries and large segmental branches. Subsegmental branches cannot be reliably evaluated due to inadequate opacification. Mediastinum: No evidence of mediastinal lymphadenopathy. The heart and pericardium demonstrate no acute abnormality. There is no acute abnormality of the thoracic aorta. Lungs/pleura: Linear focal consolidative changes within the left lower lobe can be suggestive of atelectasis. Other focal areas of tree in bud nodular density within the left upper lobe with adjacent ground-glass densities and other areas of ground-glass densities within the right lower lobe. Findings can be suggestive of multifocal pneumonia. The lungs are otherwise without acute process. No focal consolidation or pulmonary edema. No evidence of pleural effusion or pneumothorax. Upper Abdomen: Limited images of the upper abdomen are unremarkable. Soft Tissues/Bones: No acute bone or soft tissue abnormality. No filling defect within the main pulmonary arteries and large segmental branches. Subsegmental branches cannot be reliably evaluated due to inadequate opacification. Linear focal consolidative changes within the left lower lobe can be suggestive of atelectasis. Other focal areas of tree in bud nodular density within the left upper lobe with adjacent ground-glass densities and other areas of ground-glass densities within the right lower lobe. Findings can be suggestive of multifocal pneumonia. RECOMMENDATIONS: Unavailable       LABS:  Results for orders placed or performed during the hospital encounter of 02/15/23   COVID-19, Rapid    Specimen: Nasopharyngeal Swab   Result Value Ref Range    Specimen Description . NASOPHARYNGEAL SWAB     SARS-CoV-2, Rapid Not Detected Not Detected   RAPID INFLUENZA A/B ANTIGENS    Specimen: Nasopharyngeal   Result Value Ref Range    Flu A Antigen NEGATIVE NEGATIVE Flu B Antigen NEGATIVE NEGATIVE   CBC with Auto Differential   Result Value Ref Range    WBC 15.9 (H) 3.5 - 11.3 k/uL    RBC 4.58 3.95 - 5.11 m/uL    Hemoglobin 13.5 11.9 - 15.1 g/dL    Hematocrit 41.0 36.3 - 47.1 %    MCV 89.5 82.6 - 102.9 fL    MCH 29.5 25.2 - 33.5 pg    MCHC 32.9 25.2 - 33.5 g/dL    RDW 14.5 (H) 11.8 - 14.4 %    Platelets 546 082 - 432 k/uL    MPV 9.3 8.1 - 13.5 fL    NRBC Automated 0.0 0.0 per 100 WBC    Seg Neutrophils 85 (H) 36 - 65 %    Lymphocytes 11 (L) 24 - 43 %    Monocytes 2 (L) 3 - 12 %    Eosinophils % 1 1 - 4 %    Basophils 0 0 - 2 %    Immature Granulocytes 1 (H) 0 %    Segs Absolute 13.68 (H) 1.50 - 8.10 k/uL    Absolute Lymph # 1.70 1.10 - 3.70 k/uL    Absolute Mono # 0.27 0.10 - 1.20 k/uL    Absolute Eos # 0.08 0.00 - 0.44 k/uL    Basophils Absolute 0.07 0.00 - 0.20 k/uL    Absolute Immature Granulocyte 0.10 0.00 - 0.30 k/uL    RBC Morphology ANISOCYTOSIS PRESENT    Basic Metabolic Panel   Result Value Ref Range    Glucose 118 (H) 70 - 99 mg/dL    BUN 17 6 - 20 mg/dL    Creatinine 0.83 0.50 - 0.90 mg/dL    Est, Glom Filt Rate >60 >60 mL/min/1.73m2    Bun/Cre Ratio 20 9 - 20    Calcium 8.6 8.6 - 10.4 mg/dL    Sodium 140 135 - 144 mmol/L    Potassium 4.0 3.7 - 5.3 mmol/L    Chloride 103 98 - 107 mmol/L    CO2 25 20 - 31 mmol/L    Anion Gap 12 9 - 17 mmol/L   Urinalysis with Reflex to Culture    Specimen: Urine, clean catch   Result Value Ref Range    Color, UA Yellow Yellow    Turbidity UA Clear Clear    Glucose, Ur NEGATIVE NEGATIVE    Bilirubin Urine NEGATIVE NEGATIVE    Ketones, Urine TRACE (A) NEGATIVE    Specific Gravity, UA 1.030 (H) 1.010 - 1.025    Urine Hgb 2+ (A) NEGATIVE    pH, UA 5.5 5.0 - 6.0    Protein, UA NEGATIVE NEGATIVE    Urobilinogen, Urine Normal Normal    Nitrite, Urine NEGATIVE NEGATIVE    Leukocyte Esterase, Urine NEGATIVE NEGATIVE   Pregnancy, Urine   Result Value Ref Range    HCG(Urine) Pregnancy Test NEGATIVE NEGATIVE   Troponin   Result Value Ref Range Troponin, High Sensitivity <6 0 - 14 ng/L   D-Dimer, Quantitative   Result Value Ref Range    D-Dimer, Quant 0.48 0.00 - 0.59 mg/L FEU   Microscopic Urinalysis   Result Value Ref Range    WBC, UA 0 TO 2 0 - 4 /HPF    RBC, UA 2 TO 5 0 - 4 /HPF    Epithelial Cells UA 50  0 - 5 /HPF    Bacteria, UA FEW (A) None    Mucus, UA 1+ (A) None    Other Observations UA (A) NOT REQ. Utilizing a urinalysis as the only screening method to exclude a potential uropathogen can be unreliable in many patient populations. Rapid screening tests are less sensitive than culture and if UTI is a clinical possibility, culture should be considered despite a negative urinalysis. Brain Natriuretic Peptide   Result Value Ref Range    Pro- <300 pg/mL   Lactic Acid   Result Value Ref Range    Lactic Acid <0.2 (L) 0.5 - 2.2 mmol/L       EMERGENCY DEPARTMENT COURSE:   Vitals:    Vitals:    02/15/23 2330 02/16/23 0000 02/16/23 0030 02/16/23 0045   BP:    103/60   Pulse: 81 77 82 79   Resp: 27 24 21 21   Temp:       TempSrc:       SpO2: 92% 94% 93% 95%     -------------------------  BP: 103/60, Temp: 97 °F (36.1 °C), Heart Rate: 79, Resp: 21    CONSULTS:  Accepting hospitalist service    PROCEDURES:  None    FINAL IMPRESSION      1. Multifocal pneumonia    2. Hypoxia    3. Exacerbation of asthma, unspecified asthma severity, unspecified whether persistent          DISPOSITION/PLAN   DISPOSITION Decision To Transfer 02/15/2023 11:31:41 PM      PATIENT REFERRED TO:  No follow-up provider specified. DISCHARGE MEDICATIONS:  New Prescriptions    No medications on file       There are no active hospital problems to display for this patient.       (Please note that portions of this note were completed with a voice recognition program.  Efforts were made to edit the dictations but occasionally words are mis-transcribed.)    Yandel Sandy MD, [facility]  Attending Emergency Medicine Physician        Yandel Sandy MD  02/16/23 5933

## 2023-02-16 NOTE — ED NOTES
Patient states she would like to be transferred to [address] Spoke w/ house supervisor Minda Arceo), gave patient info, and states he will give us a call back.      Jose Manuel Tejeda, EDEN  02/15/23 6862       Jose Manuel Tejeda RN  02/16/23 1309

## 2023-02-17 LAB
EKG ATRIAL RATE: 85 BPM
EKG P AXIS: 62 DEGREES
EKG P-R INTERVAL: 158 MS
EKG Q-T INTERVAL: 378 MS
EKG QRS DURATION: 90 MS
EKG QTC CALCULATION (BAZETT): 449 MS
EKG R AXIS: 47 DEGREES
EKG T AXIS: 46 DEGREES
EKG VENTRICULAR RATE: 85 BPM

## 2023-02-19 LAB
MICROORGANISM SPEC CULT: NORMAL
MICROORGANISM SPEC CULT: NORMAL
SPECIMEN DESCRIPTION: NORMAL
SPECIMEN DESCRIPTION: NORMAL

## 2023-02-20 ENCOUNTER — OFFICE VISIT (OUTPATIENT)
Dept: FAMILY MEDICINE CLINIC | Age: 38
End: 2023-02-20
Payer: MEDICAID

## 2023-02-20 VITALS
OXYGEN SATURATION: 99 % | HEIGHT: 62 IN | WEIGHT: 239.7 LBS | SYSTOLIC BLOOD PRESSURE: 122 MMHG | RESPIRATION RATE: 20 BRPM | DIASTOLIC BLOOD PRESSURE: 72 MMHG | HEART RATE: 84 BPM | BODY MASS INDEX: 44.11 KG/M2

## 2023-02-20 DIAGNOSIS — J45.909 MODERATE ASTHMA, UNSPECIFIED WHETHER COMPLICATED, UNSPECIFIED WHETHER PERSISTENT: ICD-10-CM

## 2023-02-20 DIAGNOSIS — Z91.09 ENVIRONMENTAL ALLERGIES: ICD-10-CM

## 2023-02-20 DIAGNOSIS — J18.9 PNEUMONIA OF BOTH LOWER LOBES DUE TO INFECTIOUS ORGANISM: Primary | ICD-10-CM

## 2023-02-20 PROCEDURE — 99214 OFFICE O/P EST MOD 30 MIN: CPT | Performed by: STUDENT IN AN ORGANIZED HEALTH CARE EDUCATION/TRAINING PROGRAM

## 2023-02-20 RX ORDER — AZITHROMYCIN 250 MG/1
TABLET, FILM COATED ORAL
COMMUNITY
Start: 2023-02-19

## 2023-02-20 RX ORDER — PREDNISONE 20 MG/1
TABLET ORAL
Qty: 15 TABLET | Refills: 0 | Status: SHIPPED | OUTPATIENT
Start: 2023-02-20 | End: 2023-03-04

## 2023-02-20 RX ORDER — FLUTICASONE PROPIONATE 50 MCG
SPRAY, SUSPENSION (ML) NASAL
Qty: 16 G | Refills: 3 | Status: SHIPPED | OUTPATIENT
Start: 2023-02-20

## 2023-02-20 RX ORDER — CEFDINIR 300 MG/1
CAPSULE ORAL
COMMUNITY
Start: 2023-02-19

## 2023-02-20 ASSESSMENT — ENCOUNTER SYMPTOMS
ABDOMINAL PAIN: 0
SHORTNESS OF BREATH: 1
CONSTIPATION: 0
BLOOD IN STOOL: 0
NAUSEA: 0
VOMITING: 0
TROUBLE SWALLOWING: 0
EYE PAIN: 0
COUGH: 1
DIARRHEA: 0

## 2023-02-20 NOTE — PROGRESS NOTES
[address] [address]  [address] [address]  Dept: [phone]  Dept Fax: [phone]  Loc: [phone]      Nikkie Cannon is a 45 y.o. female who presents today for:  Chief Complaint   Patient presents with    New Patient    Shortness of Breath     Aches all over. Pt dx with URI. ATB given        Goals         Stop Cigarette/Tobacco use (pt-stated)       Patient Self-Management Goal for Health Maintenance  Goal: I will chose a goal related to tobacco cessation:  I will think about reasons why I should quit smoking. Barriers: lack of motivation  Plan for overcoming my barriers: family support  Confidence: 3/10  Anticipated Goal Completion Date: Jan 7, 2018                HPI:     HPI  Patient is a 69-year-old female who presents to the office to establish care and for hospital follow-up. Patient recently discharged from [address] [place] [address] on 2/18/2023 for bilateral lower lobe pneumonia. Patient started on azithromycin and cefdinir. She does have a history of moderate persistent asthma and has been struggling with shortness of breath and wheezing since being discharged from the hospital.  She states that she is using her albuterol inhaler every 4 hours with no significant improvement. She has continued to use her Flovent inhaler 2 puffs twice daily. She states that currently she is taking azithromycin and cefdinir with minimal improvement. Admits to some soreness in her back and feels achy all over. She states that she was given nebulizer treatments in the hospital which seem to help her however she does not have a nebulizer machine at home as she lost hers previously. She states that she would like to try to get another nebulizer machine at this time if possible.   She initially was hypoxic in the emergency department and states that her oxygen at this time seems to be much better and feels like her breathing is slightly better however the achiness seems to have worsened. She continues to vape and smoke throughout this illness. Past Medical History:   Diagnosis Date    Allergic rhinitis     Anemia     Asthma     Depression     Headache     Herniated intervertebral disc of lumbar spine     Shortness of breath     Tobacco abuse       Past Surgical History:   Procedure Laterality Date     SECTION      PAIN MANAGEMENT PROCEDURE Left 2020    Left C7 TRANSFORAMINAL performed by Vignesh Sorto MD at [address] Left 2020    Left C7 TRANSFORAMINAL performed by Vignesh Sorto MD at [address] Left 2021    C7 T1 Interlaminar Epidural Steroid Injection performed by Vignesh Sorto MD at [facility]      TYMPANOSTOMY TUBE PLACEMENT       Family History   Problem Relation Age of Onset    Heart Disease Mother     Other Mother         respiratory illness    Kidney Disease Mother     Early Death Mother 45        cardiac arrest    Heart Disease Father     High Blood Pressure Father     Other Father         liver/intestinal    Lung Cancer Maternal Grandmother     Diabetes Other     Early Death Other     Other Other         respiratory illness    Early Death Other     Other Other         respiratory illness    Bleeding Prob Other     Thyroid Disease Other      Social History     Tobacco Use    Smoking status: Every Day     Packs/day: 0.50     Years: 10.00     Pack years: 5.00     Types: E-Cigarettes, Cigarettes     Start date: 2004    Smokeless tobacco: Never    Tobacco comments:     Pt advises will see PCP when ready to quit; no longer vapes.      Substance Use Topics    Alcohol use: Yes      Current Outpatient Medications   Medication Sig Dispense Refill    cefdinir (OMNICEF) 300 MG capsule       azithromycin (ZITHROMAX) 250 MG tablet       fluticasone (FLONASE) 50 MCG/ACT nasal spray SHAKE LIQUID AND USE 2 SPRAYS IN EACH NOSTRIL DAILY 16 g 3    predniSONE (DELTASONE) 20 MG tablet Take 2 tablets by mouth daily for 3 days, THEN 1.5 tablets daily for 3 days, THEN 1 tablet daily for 3 days, THEN 0.5 tablets daily for 3 days. 15 tablet 0    fluticasone (FLOVENT HFA) 44 MCG/ACT inhaler Inhale 2 puffs into the lungs 2 times daily 1 each 3    albuterol sulfate HFA (VENTOLIN HFA) 108 (90 Base) MCG/ACT inhaler Inhale 2 puffs into the lungs every 4 hours as needed for Wheezing ventolin 1 each 5    pregabalin (LYRICA) 200 MG capsule Take 1 capsule by mouth 3 times daily for 30 days. 90 capsule 5    butalbital-acetaminophen-caffeine (FIORICET, ESGIC) -40 MG per tablet Take 1 tablet by mouth every 4 hours as needed for Headaches or Migraine 60 tablet 0    diclofenac sodium (VOLTAREN) 1 % GEL Apply 2 g topically 4 times daily 5 Tube 11    albuterol (PROVENTIL) (2.5 MG/3ML) 0.083% nebulizer solution Take 3 mLs by nebulization every 4 hours as needed for Wheezing 120 each 0    Multiple Vitamins-Minerals (MULTIVITAMIN WOMEN PO) Take 1 tablet by mouth daily       No current facility-administered medications for this visit. Allergies   Allergen Reactions    Norco [Hydrocodone-Acetaminophen] Other (See Comments)     Patient stated it gave her severe headaches       Health Maintenance   Topic Date Due    Depression Monitoring  Never done    Hepatitis C screen  Never done    Diabetes screen  01/08/2020    Cervical cancer screen  06/27/2020    COVID-19 Vaccine (3 - Booster for Pfizer series) 07/13/2021    Flu vaccine (1) 08/01/2022    DTaP/Tdap/Td vaccine (3 - Td or Tdap) 10/24/2030    Pneumococcal 0-64 years Vaccine  Completed    HIV screen  Completed    Hepatitis A vaccine  Aged Out    Hib vaccine  Aged Out    Meningococcal (ACWY) vaccine  Aged Out    Varicella vaccine  Discontinued       Subjective:      Review of Systems   Constitutional:  Positive for chills and fatigue. Negative for fever.    HENT: Negative for ear pain, postnasal drip and trouble swallowing. Eyes:  Negative for pain and visual disturbance. Respiratory:  Positive for cough and shortness of breath. Cardiovascular:  Negative for chest pain and palpitations. Gastrointestinal:  Negative for abdominal pain, blood in stool, constipation, diarrhea, nausea and vomiting. Genitourinary:  Negative for dysuria and urgency. Musculoskeletal:  Positive for arthralgias and myalgias. Skin:  Negative for rash and wound. Neurological:  Positive for headaches. Negative for dizziness. Psychiatric/Behavioral:  Negative for dysphoric mood. The patient is not nervous/anxious. Objective:     Vitals:    02/20/23 1433   BP: 122/72   Pulse: 84   Resp: 20   SpO2: 99%   Weight: 239 lb 11.2 oz (108.7 kg)   Height: 5' 2\" (1.575 m)       Body mass index is 43.84 kg/m². Wt Readings from Last 3 Encounters:   02/20/23 239 lb 11.2 oz (108.7 kg)   02/15/23 229 lb (103.9 kg)   02/12/23 219 lb (99.3 kg)     BP Readings from Last 3 Encounters:   02/20/23 122/72   02/16/23 103/60   02/15/23 120/82       Physical Exam  Vitals and nursing note reviewed. Constitutional:       General: She is not in acute distress. Appearance: She is well-developed. She is ill-appearing. She is not diaphoretic. HENT:      Head: Normocephalic and atraumatic. Right Ear: External ear normal.      Left Ear: External ear normal.      Nose: Nose normal.   Eyes:      General: No scleral icterus. Right eye: No discharge. Left eye: No discharge. Conjunctiva/sclera: Conjunctivae normal.   Cardiovascular:      Rate and Rhythm: Normal rate and regular rhythm. Heart sounds: Normal heart sounds. No murmur heard. Pulmonary:      Effort: Pulmonary effort is normal. No respiratory distress. Breath sounds: No stridor. Wheezing present. No rhonchi. Chest:      Chest wall: Tenderness present.    Musculoskeletal:      Cervical back: Normal range of motion. Skin:     General: Skin is warm and dry. Findings: No erythema or rash. Neurological:      Mental Status: She is alert and oriented to person, place, and time. Cranial Nerves: No cranial nerve deficit. Psychiatric:         Behavior: Behavior normal.         Thought Content: Thought content normal.         Judgment: Judgment normal.       Lab Results   Component Value Date    WBC 15.9 (H) 02/15/2023    HGB 13.5 02/15/2023    HCT 41.0 02/15/2023     02/15/2023    CHOL 208 (H) 05/11/2017    TRIG 72 05/11/2017    HDL 70 05/11/2017    AST 11 01/19/2021     02/15/2023    K 4.0 02/15/2023     02/15/2023    CREATININE 0.83 02/15/2023    BUN 17 02/15/2023    CO2 25 02/15/2023    TSH 3.85 05/11/2017    LABA1C 4.6 (L) 06/07/2017    LABGLOM >60 02/15/2023    CALCIUM 8.6 02/15/2023    VITD25 13.6 (L) 05/11/2017       Imaging Results:    CT CHEST PULMONARY EMBOLISM W CONTRAST    Result Date: 2/15/2023  EXAMINATION: CTA OF THE CHEST 2/15/2023 9:45 pm TECHNIQUE: CTA of the chest was performed after the administration of intravenous contrast.  Multiplanar reformatted images are provided for review. MIP images are provided for review. Automated exposure control, iterative reconstruction, and/or weight based adjustment of the mA/kV was utilized to reduce the radiation dose to as low as reasonably achievable. COMPARISON: None. HISTORY: ORDERING SYSTEM PROVIDED HISTORY: hypoxia TECHNOLOGIST PROVIDED HISTORY: hypoxia Decision Support Exception - unselect if not a suspected or confirmed emergency medical condition->Emergency Medical Condition (MA) Reason for Exam: SOB, chest pain cough for 2 weeks, hx of asthma, smoker FINDINGS: Pulmonary Arteries: Pulmonary arteries are not adequately opacified for evaluation. No filling defect within the main pulmonary arteries and large segmental branches. Subsegmental branches cannot be reliably evaluated due to inadequate opacification.  Mediastinum: No evidence of mediastinal lymphadenopathy. The heart and pericardium demonstrate no acute abnormality. There is no acute abnormality of the thoracic aorta. Lungs/pleura: Linear focal consolidative changes within the left lower lobe can be suggestive of atelectasis. Other focal areas of tree in bud nodular density within the left upper lobe with adjacent ground-glass densities and other areas of ground-glass densities within the right lower lobe. Findings can be suggestive of multifocal pneumonia. The lungs are otherwise without acute process. No focal consolidation or pulmonary edema. No evidence of pleural effusion or pneumothorax. Upper Abdomen: Limited images of the upper abdomen are unremarkable. Soft Tissues/Bones: No acute bone or soft tissue abnormality. No filling defect within the main pulmonary arteries and large segmental branches. Subsegmental branches cannot be reliably evaluated due to inadequate opacification. Linear focal consolidative changes within the left lower lobe can be suggestive of atelectasis. Other focal areas of tree in bud nodular density within the left upper lobe with adjacent ground-glass densities and other areas of ground-glass densities within the right lower lobe. Findings can be suggestive of multifocal pneumonia. RECOMMENDATIONS: Unavailable         Assessment/Plan:     Kady Dong was seen today for new patient and shortness of breath. Diagnoses and all orders for this visit:    Pneumonia of both lower lobes due to infectious organism  -     predniSONE (DELTASONE) 20 MG tablet; Take 2 tablets by mouth daily for 3 days, THEN 1.5 tablets daily for 3 days, THEN 1 tablet daily for 3 days, THEN 0.5 tablets daily for 3 days.     Environmental allergies  -     fluticasone (FLONASE) 50 MCG/ACT nasal spray; SHAKE LIQUID AND USE 2 SPRAYS IN EACH NOSTRIL DAILY    Moderate asthma, unspecified whether complicated, unspecified whether persistent    Pneumonia of the bilateral lower lobes we will order nebulizer machine at this time as well as DuoNebs to help with her wheezing and breathing issues. We will also give an oral steroid taper for the next 10 to 12 days. Recommend continuing cefdinir and azithromycin until completed. I did recommend the patient that should her shortness of breath worsen or fail to improve over the next couple days she should represent to the emergency department for further evaluation. I did recommend cessation from all tobacco products including vaping at this time. We will also trial Trelegy for her moderate persistent asthma at this time patient was given a sample today. Danielle Robb was evaluated today and a DME order was entered for a nebulizer compressor in order to administer Albuterol and Ipratropium due to the diagnosis of moderate persistent asthma and bilateral pneumonia. The need for this equipment and treatment was discussed with the patient and she understands and is in agreement. No follow-ups on file. Medications Prescribed:  Orders Placed This Encounter   Medications    fluticasone (FLONASE) 50 MCG/ACT nasal spray     Sig: SHAKE LIQUID AND USE 2 SPRAYS IN EACH NOSTRIL DAILY     Dispense:  16 g     Refill:  3    predniSONE (DELTASONE) 20 MG tablet     Sig: Take 2 tablets by mouth daily for 3 days, THEN 1.5 tablets daily for 3 days, THEN 1 tablet daily for 3 days, THEN 0.5 tablets daily for 3 days. Dispense:  15 tablet     Refill:  0         Future Appointments   Date Time Provider Magda Ashraf   2/24/2023  2:00 PM [facility]       Patient given educational materials - see patient instructions. Discussed use, benefit, and side effects of prescribed medications. All patient questions answered. Pt voiced understanding. Reviewed health maintenance. Instructed to continue current medications, diet and exercise. Patient agreed with treatment plan. Follow up as directed.      Electronically signed by Mir Jamison DO on 2/20/2023 at 3:10 PM

## 2023-02-27 ENCOUNTER — PATIENT MESSAGE (OUTPATIENT)
Dept: PAIN MANAGEMENT | Age: 38
End: 2023-02-27

## 2023-02-27 DIAGNOSIS — M54.12 CERVICAL RADICULOPATHY: ICD-10-CM

## 2023-02-27 DIAGNOSIS — M79.18 MYOFASCIAL PAIN: ICD-10-CM

## 2023-02-27 RX ORDER — PREGABALIN 200 MG/1
200 CAPSULE ORAL 3 TIMES DAILY
Qty: 90 CAPSULE | Refills: 5 | Status: SHIPPED | OUTPATIENT
Start: 2023-02-27 | End: 2023-03-29

## 2023-02-27 RX ORDER — BUTALBITAL, ACETAMINOPHEN AND CAFFEINE 50; 325; 40 MG/1; MG/1; MG/1
1 TABLET ORAL EVERY 4 HOURS PRN
Qty: 60 TABLET | Refills: 0 | Status: SHIPPED | OUTPATIENT
Start: 2023-02-27

## 2023-02-27 NOTE — TELEPHONE ENCOUNTER
From: Aniceto Barton  To: Fabricio Villegas  Sent: 2/27/2023 11:46 AM EST  Subject: Medication refills     I don't have any refills and I currently am recovering from having pneumonia I had a fever and had to reschedule my appointment I need my medications I can not go a whole month without them I also need my fioricet for my migraines I have no refills on them and they been hurting my head so bad nothing is helping them anymore over the counter don't know why they didn't get refilled before but I need them and I have an appointment scheduled for the 6th of April to see the doctor that was the first available one so please help me with my prescriptions until then thanks a bunch I'll be waiting for a response

## 2023-02-28 ENCOUNTER — OFFICE VISIT (OUTPATIENT)
Dept: FAMILY MEDICINE CLINIC | Age: 38
End: 2023-02-28
Payer: MEDICAID

## 2023-02-28 VITALS
HEIGHT: 62 IN | SYSTOLIC BLOOD PRESSURE: 124 MMHG | HEART RATE: 95 BPM | BODY MASS INDEX: 42.55 KG/M2 | RESPIRATION RATE: 16 BRPM | OXYGEN SATURATION: 96 % | DIASTOLIC BLOOD PRESSURE: 66 MMHG | WEIGHT: 231.2 LBS

## 2023-02-28 DIAGNOSIS — J45.909 MODERATE ASTHMA, UNSPECIFIED WHETHER COMPLICATED, UNSPECIFIED WHETHER PERSISTENT: ICD-10-CM

## 2023-02-28 DIAGNOSIS — Z72.0 TOBACCO ABUSE: ICD-10-CM

## 2023-02-28 DIAGNOSIS — J18.9 PNEUMONIA OF BOTH LOWER LOBES DUE TO INFECTIOUS ORGANISM: Primary | ICD-10-CM

## 2023-02-28 PROCEDURE — 99214 OFFICE O/P EST MOD 30 MIN: CPT | Performed by: STUDENT IN AN ORGANIZED HEALTH CARE EDUCATION/TRAINING PROGRAM

## 2023-02-28 PROCEDURE — 99213 OFFICE O/P EST LOW 20 MIN: CPT

## 2023-02-28 RX ORDER — NICOTINE 21 MG/24HR
1 PATCH, TRANSDERMAL 24 HOURS TRANSDERMAL DAILY
Qty: 42 PATCH | Refills: 0 | Status: SHIPPED | OUTPATIENT
Start: 2023-02-28 | End: 2023-04-11

## 2023-02-28 RX ORDER — BUPROPION HYDROCHLORIDE 150 MG/1
150 TABLET, EXTENDED RELEASE ORAL 2 TIMES DAILY
Qty: 60 TABLET | Refills: 0 | Status: SHIPPED | OUTPATIENT
Start: 2023-02-28

## 2023-02-28 RX ORDER — IPRATROPIUM BROMIDE AND ALBUTEROL SULFATE 2.5; .5 MG/3ML; MG/3ML
1 SOLUTION RESPIRATORY (INHALATION) EVERY 4 HOURS
Qty: 30 EACH | Refills: 1 | Status: SHIPPED | OUTPATIENT
Start: 2023-02-28 | End: 2023-02-28

## 2023-02-28 RX ORDER — IPRATROPIUM BROMIDE AND ALBUTEROL SULFATE 2.5; .5 MG/3ML; MG/3ML
1 SOLUTION RESPIRATORY (INHALATION) EVERY 4 HOURS
Qty: 30 EACH | Refills: 1 | Status: SHIPPED | OUTPATIENT
Start: 2023-02-28

## 2023-02-28 SDOH — ECONOMIC STABILITY: FOOD INSECURITY: WITHIN THE PAST 12 MONTHS, THE FOOD YOU BOUGHT JUST DIDN'T LAST AND YOU DIDN'T HAVE MONEY TO GET MORE.: PATIENT DECLINED

## 2023-02-28 SDOH — ECONOMIC STABILITY: HOUSING INSECURITY
IN THE LAST 12 MONTHS, WAS THERE A TIME WHEN YOU DID NOT HAVE A STEADY PLACE TO SLEEP OR SLEPT IN A SHELTER (INCLUDING NOW)?: NO

## 2023-02-28 SDOH — ECONOMIC STABILITY: FOOD INSECURITY: WITHIN THE PAST 12 MONTHS, YOU WORRIED THAT YOUR FOOD WOULD RUN OUT BEFORE YOU GOT MONEY TO BUY MORE.: PATIENT DECLINED

## 2023-02-28 SDOH — ECONOMIC STABILITY: INCOME INSECURITY: HOW HARD IS IT FOR YOU TO PAY FOR THE VERY BASICS LIKE FOOD, HOUSING, MEDICAL CARE, AND HEATING?: PATIENT DECLINED

## 2023-02-28 NOTE — PROGRESS NOTES
TENISHA Linda [address]  [address] [facility] [address]  Dept: [phone]  Dept Fax: [phone]  Loc: [phone]      Brii Villafana is a 45 y.o. female who presents today for:  Chief Complaint   Patient presents with    Breathing Problem     Pt was in ED on  2/16/23, [address] Pt having troubles of breathing. Goals         Stop Cigarette/Tobacco use (pt-stated)       Patient Self-Management Goal for Health Maintenance  Goal: I will chose a goal related to tobacco cessation:  I will think about reasons why I should quit smoking. Barriers: lack of motivation  Plan for overcoming my barriers: family support  Confidence: 3/10  Anticipated Goal Completion Date: Jan 7, 2018                HPI:     HPI  Patient is a 51-year-old female presents to the office with 1 week follow-up for breathing issues. Patient recently hospitalized for pneumonia from 2/16/2023 to 2/18/2023. Seen in our office on 2/20/2023 and medications were continued at that time. Patient was also given trial of Trelegy inhaler which seemed to help mildly however she continues to have issues with shortness of breath specifically at night. A nebulizer machine was ordered at her last visit but she was unable to  the nebulizer due to insurance coverage. She states that breathing significantly worse at night. She states that she continues to have shortness of breath and significant coughing at night. Not sure of any worsening fevers. Feels like she is also at the point where she needs to go back into the hospital due to the shortness of breath. Has been using her albuterol inhaler without any significant relief. Continues to smoke and vape at this time. Would like help with cessation if possible today.     Past Medical History:   Diagnosis Date    Allergic rhinitis     Anemia     Asthma     Depression     Headache     Herniated intervertebral disc of lumbar spine     Shortness of breath     Tobacco abuse       Past Surgical History:   Procedure Laterality Date     SECTION  2008    PAIN MANAGEMENT PROCEDURE Left 2020    Left C7 TRANSFORAMINAL performed by Pola Coe MD at [address] Left 2020    Left C7 TRANSFORAMINAL performed by Pola Coe MD at [address] Left 2021    C7 T1 Interlaminar Epidural Steroid Injection performed by Pola Coe MD at [address] [facility]      TYMPANOSTOMY TUBE PLACEMENT       Family History   Problem Relation Age of Onset    Heart Disease Mother     Other Mother         respiratory illness    Kidney Disease Mother     Early Death Mother 45        cardiac arrest    Heart Disease Father     High Blood Pressure Father     Other Father         liver/intestinal    Lung Cancer Maternal Grandmother     Diabetes Other     Early Death Other     Other Other         respiratory illness    Early Death Other     Other Other         respiratory illness    Bleeding Prob Other     Thyroid Disease Other      Social History     Tobacco Use    Smoking status: Every Day     Packs/day: 0.50     Years: 10.00     Pack years: 5.00     Types: E-Cigarettes, Cigarettes     Start date: 2004    Smokeless tobacco: Never    Tobacco comments:     Pt advises will see PCP when ready to quit; no longer vapes. Substance Use Topics    Alcohol use: Yes      Current Outpatient Medications   Medication Sig Dispense Refill    buPROPion (ZYBAN) 150 MG extended release tablet Take 1 tablet by mouth 2 times daily 60 tablet 0    nicotine (NICODERM CQ) 14 MG/24HR Place 1 patch onto the skin daily 42 patch 0    ipratropium-albuterol (DUONEB) 0.5-2.5 (3) MG/3ML SOLN nebulizer solution Inhale 3 mLs into the lungs every 4 hours 30 each 1    pregabalin (LYRICA) 200 MG capsule Take 1 capsule by mouth 3 times daily for 30 days.  80 capsule 5    butalbital-acetaminophen-caffeine (FIORICET, ESGIC) -40 MG per tablet Take 1 tablet by mouth every 4 hours as needed for Headaches or Migraine 60 tablet 0    diclofenac sodium (VOLTAREN) 1 % GEL Apply 2 g topically 4 times daily 5 each 11    azithromycin (ZITHROMAX) 250 MG tablet       fluticasone (FLONASE) 50 MCG/ACT nasal spray SHAKE LIQUID AND USE 2 SPRAYS IN EACH NOSTRIL DAILY 16 g 3    fluticasone (FLOVENT HFA) 44 MCG/ACT inhaler Inhale 2 puffs into the lungs 2 times daily 1 each 3    albuterol sulfate HFA (VENTOLIN HFA) 108 (90 Base) MCG/ACT inhaler Inhale 2 puffs into the lungs every 4 hours as needed for Wheezing ventolin 1 each 5    albuterol (PROVENTIL) (2.5 MG/3ML) 0.083% nebulizer solution Take 3 mLs by nebulization every 4 hours as needed for Wheezing 120 each 0    Multiple Vitamins-Minerals (MULTIVITAMIN WOMEN PO) Take 1 tablet by mouth daily      fluticasone-umeclidin-vilant (TRELEGY ELLIPTA) 100-62.5-25 MCG/ACT AEPB inhaler Inhale 1 puff into the lungs daily 60 each 1    cefdinir (OMNICEF) 300 MG capsule Take 1 capsule by mouth 2 times daily for 10 days 20 capsule 0     No current facility-administered medications for this visit. Allergies   Allergen Reactions    Norco [Hydrocodone-Acetaminophen] Other (See Comments)     Patient stated it gave her severe headaches       Health Maintenance   Topic Date Due    Hepatitis C screen  Never done    Diabetes screen  01/08/2020    Cervical cancer screen  06/27/2020    COVID-19 Vaccine (3 - Booster for Pfizer series) 07/13/2021    Flu vaccine (1) 08/01/2022    Depression Monitoring  03/06/2024    DTaP/Tdap/Td vaccine (3 - Td or Tdap) 10/24/2030    Pneumococcal 0-64 years Vaccine  Completed    HIV screen  Completed    Hepatitis A vaccine  Aged Out    Hib vaccine  Aged Out    Meningococcal (ACWY) vaccine  Aged Out    Varicella vaccine  Discontinued       Subjective:      Review of Systems   Constitutional:  Positive for fatigue.  Negative for chills and fever. HENT:  Negative for ear pain, postnasal drip and trouble swallowing. Eyes:  Negative for pain and visual disturbance. Respiratory:  Positive for cough, shortness of breath and wheezing. Cardiovascular:  Negative for chest pain and palpitations. Gastrointestinal:  Negative for abdominal pain, blood in stool, constipation, diarrhea, nausea and vomiting. Genitourinary:  Negative for dysuria and urgency. Skin:  Negative for rash and wound. Neurological:  Negative for dizziness and headaches. Psychiatric/Behavioral:  Negative for dysphoric mood. The patient is not nervous/anxious. Objective:     Vitals:    02/28/23 1525   BP: 124/66   Pulse: 95   Resp: 16   SpO2: 96%   Weight: 231 lb 3.2 oz (104.9 kg)   Height: 5' 2\" (1.575 m)       Body mass index is 42.29 kg/m². Wt Readings from Last 3 Encounters:   03/06/23 231 lb 3.2 oz (104.9 kg)   02/28/23 231 lb 3.2 oz (104.9 kg)   02/20/23 239 lb 11.2 oz (108.7 kg)     BP Readings from Last 3 Encounters:   03/06/23 118/64   02/28/23 124/66   02/20/23 122/72       Physical Exam  Vitals and nursing note reviewed. Constitutional:       General: She is not in acute distress. Appearance: She is well-developed. She is not diaphoretic. HENT:      Head: Normocephalic and atraumatic. Right Ear: External ear normal.      Left Ear: External ear normal.      Nose: Nose normal.   Eyes:      General: No scleral icterus. Right eye: No discharge. Left eye: No discharge. Conjunctiva/sclera: Conjunctivae normal.   Cardiovascular:      Rate and Rhythm: Normal rate and regular rhythm. Heart sounds: Normal heart sounds. No murmur heard. Pulmonary:      Effort: Pulmonary effort is normal.      Breath sounds: Wheezing (diffuse) present. Musculoskeletal:      Cervical back: Normal range of motion. Skin:     General: Skin is warm and dry. Findings: No erythema or rash.    Neurological:      Mental Status: She is alert and oriented to person, place, and time. Cranial Nerves: No cranial nerve deficit. Psychiatric:         Behavior: Behavior normal.         Thought Content: Thought content normal.         Judgment: Judgment normal.       Lab Results   Component Value Date    WBC 15.9 (H) 02/15/2023    HGB 13.5 02/15/2023    HCT 41.0 02/15/2023     02/15/2023    CHOL 208 (H) 05/11/2017    TRIG 72 05/11/2017    HDL 70 05/11/2017    AST 11 01/19/2021     02/15/2023    K 4.0 02/15/2023     02/15/2023    CREATININE 0.83 02/15/2023    BUN 17 02/15/2023    CO2 25 02/15/2023    TSH 3.85 05/11/2017    LABA1C 4.6 (L) 06/07/2017    LABGLOM >60 02/15/2023    CALCIUM 8.6 02/15/2023    VITD25 13.6 (L) 05/11/2017       Imaging Results:    CT CHEST PULMONARY EMBOLISM W CONTRAST    Result Date: 2/15/2023  EXAMINATION: CTA OF THE CHEST 2/15/2023 9:45 pm TECHNIQUE: CTA of the chest was performed after the administration of intravenous contrast.  Multiplanar reformatted images are provided for review. MIP images are provided for review. Automated exposure control, iterative reconstruction, and/or weight based adjustment of the mA/kV was utilized to reduce the radiation dose to as low as reasonably achievable. COMPARISON: None. HISTORY: ORDERING SYSTEM PROVIDED HISTORY: hypoxia TECHNOLOGIST PROVIDED HISTORY: hypoxia Decision Support Exception - unselect if not a suspected or confirmed emergency medical condition->Emergency Medical Condition (MA) Reason for Exam: SOB, chest pain cough for 2 weeks, hx of asthma, smoker FINDINGS: Pulmonary Arteries: Pulmonary arteries are not adequately opacified for evaluation. No filling defect within the main pulmonary arteries and large segmental branches. Subsegmental branches cannot be reliably evaluated due to inadequate opacification. Mediastinum: No evidence of mediastinal lymphadenopathy. The heart and pericardium demonstrate no acute abnormality.   There is no acute abnormality of the thoracic aorta. Lungs/pleura: Linear focal consolidative changes within the left lower lobe can be suggestive of atelectasis. Other focal areas of tree in bud nodular density within the left upper lobe with adjacent ground-glass densities and other areas of ground-glass densities within the right lower lobe. Findings can be suggestive of multifocal pneumonia. The lungs are otherwise without acute process. No focal consolidation or pulmonary edema. No evidence of pleural effusion or pneumothorax. Upper Abdomen: Limited images of the upper abdomen are unremarkable. Soft Tissues/Bones: No acute bone or soft tissue abnormality. No filling defect within the main pulmonary arteries and large segmental branches. Subsegmental branches cannot be reliably evaluated due to inadequate opacification. Linear focal consolidative changes within the left lower lobe can be suggestive of atelectasis. Other focal areas of tree in bud nodular density within the left upper lobe with adjacent ground-glass densities and other areas of ground-glass densities within the right lower lobe. Findings can be suggestive of multifocal pneumonia. RECOMMENDATIONS: Unavailable         Assessment/Plan:     Osorio Steel was seen today for breathing problem. Diagnoses and all orders for this visit:    Pneumonia of both lower lobes due to infectious organism  -     Discontinue: ipratropium-albuterol (DUONEB) 0.5-2.5 (3) MG/3ML SOLN nebulizer solution; Inhale 3 mLs into the lungs every 4 hours  -     ipratropium-albuterol (DUONEB) 0.5-2.5 (3) MG/3ML SOLN nebulizer solution; Inhale 3 mLs into the lungs every 4 hours    Moderate asthma, unspecified whether complicated, unspecified whether persistent  -     Discontinue: ipratropium-albuterol (DUONEB) 0.5-2.5 (3) MG/3ML SOLN nebulizer solution; Inhale 3 mLs into the lungs every 4 hours  -     ipratropium-albuterol (DUONEB) 0.5-2.5 (3) MG/3ML SOLN nebulizer solution;  Inhale 3 mLs into the lungs every 4 hours    Tobacco abuse  -     buPROPion (ZYBAN) 150 MG extended release tablet; Take 1 tablet by mouth 2 times daily  -     nicotine (NICODERM CQ) 14 MG/24HR; Place 1 patch onto the skin daily    Pneumonia of both lower lungs with history of moderate persistent asthma-ongoing continues to deal with shortness of breath and significant wheezing on exam.  New nebulizer prescription with DuoNeb sent to the pharmacy today. Recommend trying to pick this up and start using if able. Strongly recommend cessation from both tobacco and vaping at this time. Zyban and nicotine patches prescribed today. Continue prednisone until completed. No clear indication for repeat antibiotic at this time. Recommend return to the office in 1 week for reevaluation. Discussed with patient that she should remain off work until reevaluation due to her significant breathing difficulty still. Return in about 6 days (around 3/6/2023). Medications Prescribed:  Orders Placed This Encounter   Medications    buPROPion (ZYBAN) 150 MG extended release tablet     Sig: Take 1 tablet by mouth 2 times daily     Dispense:  60 tablet     Refill:  0    nicotine (NICODERM CQ) 14 MG/24HR     Sig: Place 1 patch onto the skin daily     Dispense:  42 patch     Refill:  0    DISCONTD: ipratropium-albuterol (DUONEB) 0.5-2.5 (3) MG/3ML SOLN nebulizer solution     Sig: Inhale 3 mLs into the lungs every 4 hours     Dispense:  30 each     Refill:  1    ipratropium-albuterol (DUONEB) 0.5-2.5 (3) MG/3ML SOLN nebulizer solution     Sig: Inhale 3 mLs into the lungs every 4 hours     Dispense:  30 each     Refill:  1       Future Appointments   Date Time Provider Magda Ashraf   3/15/2023  4:00 PM DO CASEY Valle [facility]   4/6/2023  1:15 PM RAMON Wick - CNP [facility]       Patient given educational materials - see patient instructions. Discussed use, benefit, and side effects of prescribed medications.   All patient questions answered. Pt voiced understanding. Reviewed health maintenance. Instructed to continue current medications, diet and exercise. Patient agreed with treatment plan. Follow up as directed.      Electronically signed by Whitney Liang DO on 3/8/2023 at 12:16 PM

## 2023-03-01 ENCOUNTER — TELEPHONE (OUTPATIENT)
Dept: FAMILY MEDICINE CLINIC | Age: 38
End: 2023-03-01

## 2023-03-01 NOTE — TELEPHONE ENCOUNTER
Nurse called [facility] and they are not closed.  They just relocated.  Same fax number. Called pt and LM on pt VM letting her know that they relocated.

## 2023-03-01 NOTE — TELEPHONE ENCOUNTER
Pt was seen yesterday for a follow up at the ER due to pneumonia. Pt was sent in a script to [facility] for a nebulizer and it is permanently closed. Pt states that she cant use [facility] either because there is no contract with her insurance.

## 2023-03-06 ENCOUNTER — OFFICE VISIT (OUTPATIENT)
Dept: FAMILY MEDICINE CLINIC | Age: 38
End: 2023-03-06
Payer: MEDICAID

## 2023-03-06 ENCOUNTER — HOSPITAL ENCOUNTER (OUTPATIENT)
Dept: GENERAL RADIOLOGY | Age: 38
Discharge: HOME | End: 2023-03-08
Payer: MEDICAID

## 2023-03-06 VITALS
RESPIRATION RATE: 16 BRPM | BODY MASS INDEX: 42.55 KG/M2 | OXYGEN SATURATION: 98 % | SYSTOLIC BLOOD PRESSURE: 118 MMHG | HEIGHT: 62 IN | HEART RATE: 86 BPM | DIASTOLIC BLOOD PRESSURE: 64 MMHG | WEIGHT: 231.2 LBS

## 2023-03-06 DIAGNOSIS — J18.9 PNEUMONIA OF BOTH LOWER LOBES DUE TO INFECTIOUS ORGANISM: ICD-10-CM

## 2023-03-06 DIAGNOSIS — J45.51 SEVERE PERSISTENT ASTHMA WITH ACUTE EXACERBATION: ICD-10-CM

## 2023-03-06 DIAGNOSIS — J45.51 SEVERE PERSISTENT ASTHMA WITH ACUTE EXACERBATION: Primary | ICD-10-CM

## 2023-03-06 PROCEDURE — 99213 OFFICE O/P EST LOW 20 MIN: CPT

## 2023-03-06 PROCEDURE — 71046 X-RAY EXAM CHEST 2 VIEWS: CPT

## 2023-03-06 PROCEDURE — 99214 OFFICE O/P EST MOD 30 MIN: CPT | Performed by: STUDENT IN AN ORGANIZED HEALTH CARE EDUCATION/TRAINING PROGRAM

## 2023-03-06 RX ORDER — FLUTICASONE FUROATE, UMECLIDINIUM BROMIDE AND VILANTEROL TRIFENATATE 100; 62.5; 25 UG/1; UG/1; UG/1
1 POWDER RESPIRATORY (INHALATION) DAILY
Qty: 60 EACH | Refills: 1 | Status: SHIPPED | OUTPATIENT
Start: 2023-03-06

## 2023-03-06 RX ORDER — CEFDINIR 300 MG/1
300 CAPSULE ORAL 2 TIMES DAILY
Qty: 20 CAPSULE | Refills: 0 | Status: SHIPPED | OUTPATIENT
Start: 2023-03-06 | End: 2023-03-16

## 2023-03-06 ASSESSMENT — PATIENT HEALTH QUESTIONNAIRE - PHQ9
5. POOR APPETITE OR OVEREATING: 3
8. MOVING OR SPEAKING SO SLOWLY THAT OTHER PEOPLE COULD HAVE NOTICED. OR THE OPPOSITE, BEING SO FIGETY OR RESTLESS THAT YOU HAVE BEEN MOVING AROUND A LOT MORE THAN USUAL: 2
1. LITTLE INTEREST OR PLEASURE IN DOING THINGS: 1
4. FEELING TIRED OR HAVING LITTLE ENERGY: 3
6. FEELING BAD ABOUT YOURSELF - OR THAT YOU ARE A FAILURE OR HAVE LET YOURSELF OR YOUR FAMILY DOWN: 1
SUM OF ALL RESPONSES TO PHQ QUESTIONS 1-9: 13
2. FEELING DOWN, DEPRESSED OR HOPELESS: 1
10. IF YOU CHECKED OFF ANY PROBLEMS, HOW DIFFICULT HAVE THESE PROBLEMS MADE IT FOR YOU TO DO YOUR WORK, TAKE CARE OF THINGS AT HOME, OR GET ALONG WITH OTHER PEOPLE: 1
SUM OF ALL RESPONSES TO PHQ9 QUESTIONS 1 & 2: 2
9. THOUGHTS THAT YOU WOULD BE BETTER OFF DEAD, OR OF HURTING YOURSELF: 0
SUM OF ALL RESPONSES TO PHQ QUESTIONS 1-9: 13
3. TROUBLE FALLING OR STAYING ASLEEP: 2
SUM OF ALL RESPONSES TO PHQ QUESTIONS 1-9: 13
SUM OF ALL RESPONSES TO PHQ QUESTIONS 1-9: 13
7. TROUBLE CONCENTRATING ON THINGS, SUCH AS READING THE NEWSPAPER OR WATCHING TELEVISION: 0

## 2023-03-07 ENCOUNTER — TELEPHONE (OUTPATIENT)
Dept: FAMILY MEDICINE CLINIC | Age: 38
End: 2023-03-07

## 2023-03-07 NOTE — TELEPHONE ENCOUNTER
Ceasar Sue from insurance calling requesting  to sign office visit notes for 2/28/23 & 3/6/23 so she can finish disability paperwork.

## 2023-03-08 ENCOUNTER — TELEPHONE (OUTPATIENT)
Dept: FAMILY MEDICINE CLINIC | Age: 38
End: 2023-03-08

## 2023-03-08 ASSESSMENT — ENCOUNTER SYMPTOMS
TROUBLE SWALLOWING: 0
VOMITING: 0
EYE PAIN: 0
CONSTIPATION: 0
DIARRHEA: 0
NAUSEA: 0
CONSTIPATION: 0
COUGH: 1
DIARRHEA: 0
WHEEZING: 1
BLOOD IN STOOL: 0
EYE PAIN: 0
ABDOMINAL PAIN: 0
TROUBLE SWALLOWING: 0
WHEEZING: 1
ABDOMINAL PAIN: 0
COUGH: 1
VOMITING: 0
BLOOD IN STOOL: 0
SHORTNESS OF BREATH: 1
SHORTNESS OF BREATH: 1
NAUSEA: 0

## 2023-03-08 NOTE — PROGRESS NOTES
TENISHA Mckeon [address]  [address] [address]  Dept: [phone]  Dept Fax: [phone]  Loc: [phone]      Cathy Casey is a 45 y.o. female who presents today for:  Chief Complaint   Patient presents with    Cough        Goals         Stop Cigarette/Tobacco use (pt-stated)       Patient Self-Management Goal for Health Maintenance  Goal: I will chose a goal related to tobacco cessation:  I will think about reasons why I should quit smoking. Barriers: lack of motivation  Plan for overcoming my barriers: family support  Confidence: 3/10  Anticipated Goal Completion Date: 2018                HPI:     Cough  Associated symptoms include chills, shortness of breath and wheezing. Pertinent negatives include no chest pain, ear pain, fever, headaches, postnasal drip or rash. Patient is a 42-year-old female presents for 1 week follow-up for pneumonia and difficulties breathing. Patient continues to have shortness of breath at this time with continued wheezing. She states that she has been able to cut back on her cigarette smoking some but is still currently vaping and smoking some tobacco.  She states that she feels like she is fevers again and that she is having issues breathing still mostly at night. Continues to have some difficulties with chills and fatigue throughout the day. Has still not been able to  the nebulizer machine as previously ordered. The [employer] supply store that they are planning on going to had moved locations and they were not sure with a new location was.     Past Medical History:   Diagnosis Date    Allergic rhinitis     Anemia     Asthma     Depression     Headache     Herniated intervertebral disc of lumbar spine     Shortness of breath     Tobacco abuse       Past Surgical History:   Procedure Laterality Date     SECTION      PAIN MANAGEMENT PROCEDURE Left 2020 Left C7 TRANSFORAMINAL performed by Raymundo Montejo MD at [address] Left 9/18/2020    Left C7 TRANSFORAMINAL performed by Raymundo Montejo MD at [address] [place] [address] Left 2/5/2021    C7 T1 Interlaminar Epidural Steroid Injection performed by Raymundo Montejo MD at [address] [place] [address]    TYMPANOSTOMY TUBE PLACEMENT  1995     Family History   Problem Relation Age of Onset    Heart Disease Mother     Other Mother         respiratory illness    Kidney Disease Mother     Early Death Mother 45        cardiac arrest    Heart Disease Father     High Blood Pressure Father     Other Father         liver/intestinal    Lung Cancer Maternal Grandmother     Diabetes Other     Early Death Other     Other Other         respiratory illness    Early Death Other     Other Other         respiratory illness    Bleeding Prob Other     Thyroid Disease Other      Social History     Tobacco Use    Smoking status: Every Day     Packs/day: 0.50     Years: 10.00     Pack years: 5.00     Types: E-Cigarettes, Cigarettes     Start date: 1/1/2004    Smokeless tobacco: Never    Tobacco comments:     Pt advises will see PCP when ready to quit; no longer vapes.      Substance Use Topics    Alcohol use: Yes      Current Outpatient Medications   Medication Sig Dispense Refill    fluticasone-umeclidin-vilant (TRELEGY ELLIPTA) 100-62.5-25 MCG/ACT AEPB inhaler Inhale 1 puff into the lungs daily 60 each 1    cefdinir (OMNICEF) 300 MG capsule Take 1 capsule by mouth 2 times daily for 10 days 20 capsule 0    buPROPion (ZYBAN) 150 MG extended release tablet Take 1 tablet by mouth 2 times daily 60 tablet 0    nicotine (NICODERM CQ) 14 MG/24HR Place 1 patch onto the skin daily 42 patch 0    ipratropium-albuterol (DUONEB) 0.5-2.5 (3) MG/3ML SOLN nebulizer solution Inhale 3 mLs into the lungs every 4 hours 30 each 1    pregabalin (LYRICA) 200 MG capsule Take 1 capsule by mouth 3 times daily for 30 days. 90 capsule 5    butalbital-acetaminophen-caffeine (FIORICET, ESGIC) -40 MG per tablet Take 1 tablet by mouth every 4 hours as needed for Headaches or Migraine 60 tablet 0    diclofenac sodium (VOLTAREN) 1 % GEL Apply 2 g topically 4 times daily 5 each 11    azithromycin (ZITHROMAX) 250 MG tablet       fluticasone (FLONASE) 50 MCG/ACT nasal spray SHAKE LIQUID AND USE 2 SPRAYS IN EACH NOSTRIL DAILY 16 g 3    fluticasone (FLOVENT HFA) 44 MCG/ACT inhaler Inhale 2 puffs into the lungs 2 times daily 1 each 3    albuterol sulfate HFA (VENTOLIN HFA) 108 (90 Base) MCG/ACT inhaler Inhale 2 puffs into the lungs every 4 hours as needed for Wheezing ventolin 1 each 5    albuterol (PROVENTIL) (2.5 MG/3ML) 0.083% nebulizer solution Take 3 mLs by nebulization every 4 hours as needed for Wheezing 120 each 0    Multiple Vitamins-Minerals (MULTIVITAMIN WOMEN PO) Take 1 tablet by mouth daily       No current facility-administered medications for this visit. Allergies   Allergen Reactions    Norco [Hydrocodone-Acetaminophen] Other (See Comments)     Patient stated it gave her severe headaches       Health Maintenance   Topic Date Due    Hepatitis C screen  Never done    Diabetes screen  01/08/2020    Cervical cancer screen  06/27/2020    COVID-19 Vaccine (3 - Booster for Pfizer series) 07/13/2021    Flu vaccine (1) 08/01/2022    Depression Monitoring  03/06/2024    DTaP/Tdap/Td vaccine (3 - Td or Tdap) 10/24/2030    Pneumococcal 0-64 years Vaccine  Completed    HIV screen  Completed    Hepatitis A vaccine  Aged Out    Hib vaccine  Aged Out    Meningococcal (ACWY) vaccine  Aged Out    Varicella vaccine  Discontinued       Subjective:      Review of Systems   Constitutional:  Positive for chills and fatigue. Negative for fever. HENT:  Negative for ear pain, postnasal drip and trouble swallowing. Eyes:  Negative for pain and visual disturbance.    Respiratory:  Positive for cough, shortness of breath and wheezing. Cardiovascular:  Negative for chest pain and palpitations. Gastrointestinal:  Negative for abdominal pain, blood in stool, constipation, diarrhea, nausea and vomiting. Genitourinary:  Negative for dysuria and urgency. Skin:  Negative for rash and wound. Neurological:  Negative for dizziness and headaches. Psychiatric/Behavioral:  Negative for dysphoric mood. The patient is not nervous/anxious. Objective:     Vitals:    03/06/23 1544   BP: 118/64   Pulse: 86   Resp: 16   SpO2: 98%   Weight: 231 lb 3.2 oz (104.9 kg)   Height: 5' 2\" (1.575 m)       Body mass index is 42.29 kg/m². Wt Readings from Last 3 Encounters:   03/06/23 231 lb 3.2 oz (104.9 kg)   02/28/23 231 lb 3.2 oz (104.9 kg)   02/20/23 239 lb 11.2 oz (108.7 kg)     BP Readings from Last 3 Encounters:   03/06/23 118/64   02/28/23 124/66   02/20/23 122/72       Physical Exam  Vitals and nursing note reviewed. Constitutional:       General: She is not in acute distress. Appearance: She is well-developed. She is not diaphoretic. HENT:      Head: Normocephalic and atraumatic. Right Ear: External ear normal.      Left Ear: External ear normal.      Nose: Nose normal.   Eyes:      General: No scleral icterus. Right eye: No discharge. Left eye: No discharge. Conjunctiva/sclera: Conjunctivae normal.   Cardiovascular:      Rate and Rhythm: Normal rate and regular rhythm. Heart sounds: Normal heart sounds. No murmur heard. Pulmonary:      Effort: Pulmonary effort is normal.      Breath sounds: Wheezing present. Musculoskeletal:      Cervical back: Normal range of motion. Skin:     General: Skin is warm and dry. Findings: No erythema or rash. Neurological:      Mental Status: She is alert and oriented to person, place, and time. Cranial Nerves: No cranial nerve deficit. Psychiatric:         Behavior: Behavior normal.         Thought Content:  Thought content normal. Judgment: Judgment normal.       Lab Results   Component Value Date    WBC 15.9 (H) 02/15/2023    HGB 13.5 02/15/2023    HCT 41.0 02/15/2023     02/15/2023    CHOL 208 (H) 05/11/2017    TRIG 72 05/11/2017    HDL 70 05/11/2017    AST 11 01/19/2021     02/15/2023    K 4.0 02/15/2023     02/15/2023    CREATININE 0.83 02/15/2023    BUN 17 02/15/2023    CO2 25 02/15/2023    TSH 3.85 05/11/2017    LABA1C 4.6 (L) 06/07/2017    LABGLOM >60 02/15/2023    CALCIUM 8.6 02/15/2023    VITD25 13.6 (L) 05/11/2017       Imaging Results:    XR CHEST (2 VW)    Result Date: 3/6/2023  EXAMINATION: TWO XRAY VIEWS OF THE CHEST 3/6/2023 4:18 pm COMPARISON: 01/19/2021 HISTORY: ORDERING SYSTEM PROVIDED HISTORY: Severe persistent asthma with acute exacerbation TECHNOLOGIST PROVIDED HISTORY: Reason for Exam: Sob; recent pneumonia FINDINGS: The lungs are without acute focal process. There is no effusion or pneumothorax. The cardiomediastinal silhouette is stable. The osseous structures are stable. No acute process. CT CHEST PULMONARY EMBOLISM W CONTRAST    Result Date: 2/15/2023  EXAMINATION: CTA OF THE CHEST 2/15/2023 9:45 pm TECHNIQUE: CTA of the chest was performed after the administration of intravenous contrast.  Multiplanar reformatted images are provided for review. MIP images are provided for review. Automated exposure control, iterative reconstruction, and/or weight based adjustment of the mA/kV was utilized to reduce the radiation dose to as low as reasonably achievable. COMPARISON: None. HISTORY: ORDERING SYSTEM PROVIDED HISTORY: hypoxia TECHNOLOGIST PROVIDED HISTORY: hypoxia Decision Support Exception - unselect if not a suspected or confirmed emergency medical condition->Emergency Medical Condition (MA) Reason for Exam: SOB, chest pain cough for 2 weeks, hx of asthma, smoker FINDINGS: Pulmonary Arteries: Pulmonary arteries are not adequately opacified for evaluation.   No filling defect within the main pulmonary arteries and large segmental branches. Subsegmental branches cannot be reliably evaluated due to inadequate opacification. Mediastinum: No evidence of mediastinal lymphadenopathy. The heart and pericardium demonstrate no acute abnormality. There is no acute abnormality of the thoracic aorta. Lungs/pleura: Linear focal consolidative changes within the left lower lobe can be suggestive of atelectasis. Other focal areas of tree in bud nodular density within the left upper lobe with adjacent ground-glass densities and other areas of ground-glass densities within the right lower lobe. Findings can be suggestive of multifocal pneumonia. The lungs are otherwise without acute process. No focal consolidation or pulmonary edema. No evidence of pleural effusion or pneumothorax. Upper Abdomen: Limited images of the upper abdomen are unremarkable. Soft Tissues/Bones: No acute bone or soft tissue abnormality. No filling defect within the main pulmonary arteries and large segmental branches. Subsegmental branches cannot be reliably evaluated due to inadequate opacification. Linear focal consolidative changes within the left lower lobe can be suggestive of atelectasis. Other focal areas of tree in bud nodular density within the left upper lobe with adjacent ground-glass densities and other areas of ground-glass densities within the right lower lobe. Findings can be suggestive of multifocal pneumonia. RECOMMENDATIONS: Unavailable         Assessment/Plan:     Shannan Mills was seen today for cough. Diagnoses and all orders for this visit:    Severe persistent asthma with acute exacerbation  -     fluticasone-umeclidin-vilant (TRELEGY ELLIPTA) 100-62.5-25 MCG/ACT AEPB inhaler; Inhale 1 puff into the lungs daily  -     cefdinir (OMNICEF) 300 MG capsule; Take 1 capsule by mouth 2 times daily for 10 days  -     XR CHEST STANDARD (2 VW);  Future    Pneumonia of both lower lobes due to infectious organism    Persistent asthma continues to deal with difficulties with shortness of breath and wheezing from her recent pneumonia. We will recheck a chest x-ray at this time for further evaluation. Also with patient's continued fatigue and chills we will restart patient on antibiotics for another round of antibiotics at this time. We will also represcribe Trelegy Ellipta as patient had been taking this medicine with some significant relief from her asthma. Once again requested patient to  nebulizer machine from the pharmacy and give this a try. Once again patient was educated on the importance of tobacco cessation as well as vaping cessation at this time. Patient was given the address for the DME supply company at this time. Recommend patient follow-up in 1 week for reevaluation of her symptoms. Once again I did recommend patient continue to stay off of work at this time until noticeable improvement in her shortness of breath and wheezing on exam.           No follow-ups on file. Medications Prescribed:  Orders Placed This Encounter   Medications    fluticasone-umeclidin-vilant (TRELEGY ELLIPTA) 100-62.5-25 MCG/ACT AEPB inhaler     Sig: Inhale 1 puff into the lungs daily     Dispense:  60 each     Refill:  1    cefdinir (OMNICEF) 300 MG capsule     Sig: Take 1 capsule by mouth 2 times daily for 10 days     Dispense:  20 capsule     Refill:  0       Future Appointments   Date Time Provider Magda Ashraf   3/15/2023  4:00 PM [facility]   4/6/2023  1:15 PM RAMON Caballero - CNP [facility]       Patient given educational materials - see patient instructions. Discussed use, benefit, and side effects of prescribed medications. All patient questions answered. Pt voiced understanding. Reviewed health maintenance. Instructed to continue current medications, diet and exercise. Patient agreed with treatment plan. Follow up as directed.      Electronically signed by Tobin Arteaga DO on 3/8/2023 at 4:32 PM

## 2023-03-08 NOTE — TELEPHONE ENCOUNTER
Insurance is calling stating that the note from 03/06/2023 because it is holding up pt disability papers.

## 2023-03-15 ENCOUNTER — OFFICE VISIT (OUTPATIENT)
Dept: FAMILY MEDICINE CLINIC | Age: 38
End: 2023-03-15
Payer: MEDICAID

## 2023-03-15 VITALS
OXYGEN SATURATION: 97 % | BODY MASS INDEX: 45.43 KG/M2 | RESPIRATION RATE: 20 BRPM | WEIGHT: 246.9 LBS | DIASTOLIC BLOOD PRESSURE: 68 MMHG | HEIGHT: 62 IN | TEMPERATURE: 97.5 F | HEART RATE: 92 BPM | SYSTOLIC BLOOD PRESSURE: 120 MMHG

## 2023-03-15 DIAGNOSIS — J45.51 SEVERE PERSISTENT ASTHMA WITH ACUTE EXACERBATION: Primary | ICD-10-CM

## 2023-03-15 PROCEDURE — 99214 OFFICE O/P EST MOD 30 MIN: CPT | Performed by: STUDENT IN AN ORGANIZED HEALTH CARE EDUCATION/TRAINING PROGRAM

## 2023-03-15 PROCEDURE — 99213 OFFICE O/P EST LOW 20 MIN: CPT

## 2023-03-15 RX ORDER — IPRATROPIUM BROMIDE AND ALBUTEROL SULFATE 2.5; .5 MG/3ML; MG/3ML
1 SOLUTION RESPIRATORY (INHALATION) EVERY 4 HOURS PRN
Qty: 60 ML | Refills: 0 | Status: SHIPPED | OUTPATIENT
Start: 2023-03-15

## 2023-03-15 NOTE — PROGRESS NOTES
Asthma     Depression     Headache     Herniated intervertebral disc of lumbar spine     Shortness of breath     Tobacco abuse       Past Surgical History:   Procedure Laterality Date     SECTION      PAIN MANAGEMENT PROCEDURE Left 2020    Left C7 TRANSFORAMINAL performed by Quintin Lacy MD at [address] [place] [address] Left 2020    Left C7 TRANSFORAMINAL performed by Quintin Lacy MD at [address] [place] [address] Left 2021    C7 T1 Interlaminar Epidural Steroid Injection performed by Quintin Lacy MD at [address] [place] [address]      TYMPANOSTOMY TUBE PLACEMENT       Family History   Problem Relation Age of Onset    Heart Disease Mother     Other Mother         respiratory illness    Kidney Disease Mother     Early Death Mother 45        cardiac arrest    Heart Disease Father     High Blood Pressure Father     Other Father         liver/intestinal    Lung Cancer Maternal Grandmother     Diabetes Other     Early Death Other     Other Other         respiratory illness    Early Death Other     Other Other         respiratory illness    Bleeding Prob Other     Thyroid Disease Other      Social History     Tobacco Use    Smoking status: Every Day     Packs/day: 0.50     Years: 10.00     Pack years: 5.00     Types: E-Cigarettes, Cigarettes     Start date: 2004    Smokeless tobacco: Never    Tobacco comments:     Pt advises will see PCP when ready to quit; no longer vapes.      Substance Use Topics    Alcohol use: Yes      Current Outpatient Medications   Medication Sig Dispense Refill    ipratropium-albuterol (DUONEB) 0.5-2.5 (3) MG/3ML SOLN nebulizer solution Take 3 mLs by nebulization every 4 hours as needed for Shortness of Breath 60 mL 0    fluticasone-umeclidin-vilant (TRELEGY ELLIPTA) 100-62.5-25 MCG/ACT AEPB inhaler Inhale 1 puff into the lungs daily 60 each 1    buPROPion (ZYBAN) 150 MG extended release tablet

## 2023-03-21 ENCOUNTER — TELEPHONE (OUTPATIENT)
Dept: FAMILY MEDICINE CLINIC | Age: 38
End: 2023-03-21

## 2023-03-21 ASSESSMENT — ENCOUNTER SYMPTOMS
ABDOMINAL PAIN: 0
CHEST TIGHTNESS: 1
EYE PAIN: 0
TROUBLE SWALLOWING: 0
COUGH: 1
SHORTNESS OF BREATH: 1
DIARRHEA: 0
CONSTIPATION: 0
VOMITING: 0
BLOOD IN STOOL: 0
NAUSEA: 0

## 2023-03-21 NOTE — TELEPHONE ENCOUNTER
Spoke to patient. Patient states she needs PCP note to state when her pulmonology appt is for her short term disability. Patient's pulmonology appt is not until 7/2023. Writer informed patient that she can see if patient can be scheduled sooner elsewhere. Patient verbalized understanding. Also informed patient that she can talk with PCP about the return to work date at her upcoming appt on 3/29.

## 2023-03-21 NOTE — TELEPHONE ENCOUNTER
L,trc. Need more clarification. If this is something to do with FMLA or disability, patient needs to contact the insurance office.

## 2023-03-21 NOTE — TELEPHONE ENCOUNTER
Pt is calling stating that the notes her PCP signed today need to include her first pulm appointment in order to go back to work. She doesn't know when she is able to go back to work now or after her first appointment. Pt would like a call back from nurse.

## 2023-03-24 DIAGNOSIS — M79.18 MYOFASCIAL PAIN: ICD-10-CM

## 2023-03-24 DIAGNOSIS — M54.12 CERVICAL RADICULOPATHY: ICD-10-CM

## 2023-03-24 RX ORDER — BUTALBITAL, ACETAMINOPHEN AND CAFFEINE 50; 325; 40 MG/1; MG/1; MG/1
1 TABLET ORAL EVERY 4 HOURS PRN
Qty: 60 TABLET | Refills: 0 | Status: SHIPPED | OUTPATIENT
Start: 2023-03-29

## 2023-03-24 RX ORDER — PREGABALIN 200 MG/1
200 CAPSULE ORAL 3 TIMES DAILY
Qty: 90 CAPSULE | Refills: 5 | Status: SHIPPED | OUTPATIENT
Start: 2023-03-29 | End: 2023-04-28

## 2023-03-24 NOTE — TELEPHONE ENCOUNTER
Rosas Keene called requesting a refill of the below medication which has been pended for you:     Requested Prescriptions     Pending Prescriptions Disp Refills    pregabalin (LYRICA) 200 MG capsule 90 capsule 5     Sig: Take 1 capsule by mouth 3 times daily for 30 days. butalbital-acetaminophen-caffeine (FIORICET, ESGIC) -40 MG per tablet 60 tablet 0     Sig: Take 1 tablet by mouth every 4 hours as needed for Headaches or Migraine       Last Appointment Date: Visit date not found  Next Appointment Date: 3/24/2023    Allergies   Allergen Reactions    Norco [Hydrocodone-Acetaminophen] Other (See Comments)     Patient stated it gave her severe headaches       Pharmacy:  [address] [place] [address] [place] for [place]:Lyrica 200mg 02/27/23 #90. Due 03/29/23. [place] Report checked for [place], and [place]:Firociet 50/325/40 02/27/23 #60. Due 03/29/23.

## 2023-03-29 ENCOUNTER — OFFICE VISIT (OUTPATIENT)
Dept: FAMILY MEDICINE CLINIC | Age: 38
End: 2023-03-29
Payer: MEDICAID

## 2023-03-29 VITALS
OXYGEN SATURATION: 98 % | DIASTOLIC BLOOD PRESSURE: 72 MMHG | HEIGHT: 62 IN | SYSTOLIC BLOOD PRESSURE: 118 MMHG | WEIGHT: 234 LBS | TEMPERATURE: 97.4 F | HEART RATE: 87 BPM | BODY MASS INDEX: 43.06 KG/M2

## 2023-03-29 DIAGNOSIS — R20.0 BILATERAL HAND NUMBNESS: ICD-10-CM

## 2023-03-29 DIAGNOSIS — J45.40 MODERATE PERSISTENT ASTHMA WITHOUT COMPLICATION: Primary | ICD-10-CM

## 2023-03-29 DIAGNOSIS — M79.641 BILATERAL HAND PAIN: ICD-10-CM

## 2023-03-29 DIAGNOSIS — M79.642 BILATERAL HAND PAIN: ICD-10-CM

## 2023-03-29 PROCEDURE — 99214 OFFICE O/P EST MOD 30 MIN: CPT | Performed by: STUDENT IN AN ORGANIZED HEALTH CARE EDUCATION/TRAINING PROGRAM

## 2023-03-29 ASSESSMENT — ENCOUNTER SYMPTOMS
EYE PAIN: 0
VOMITING: 0
COUGH: 1
ABDOMINAL PAIN: 0
DIARRHEA: 0
TROUBLE SWALLOWING: 0
SHORTNESS OF BREATH: 0
CONSTIPATION: 0
NAUSEA: 0
BLOOD IN STOOL: 0

## 2023-03-29 NOTE — TELEPHONE ENCOUNTER
Patient seeing Chyna today. States she is ok waiting until July to see pulm. States pulm informed her that she will be placed on a cancellation list to come in sooner.

## 2023-03-29 NOTE — LETTER
March 29, 2023       Graylon Islam YOB: 1985   Ale Lauren Date of Visit:  3/29/2023       To Whom It May Concern: It is my medical opinion that Graylon Islam should remain out of work until 4/3/23. Patient seen in the office on 3/29/23. Okay to return to work on Monday 4/3/23. If you have any questions or concerns, please don't hesitate to call.     Sincerely,        Brayan Clemente, DO

## 2023-03-29 NOTE — PROGRESS NOTES
likely be a good idea for her at this point. Recommend completing PFTs as previously ordered. At this time I do believe patient should be able to return to work. I did hold her off work until resolution of this illness which appears to be at this time. Recommend returning to work on 4/3/2023. Bilateral hand numbness and pain unclear etiology possible cervical radiculopathy however could also be carpal tunnel at this point. We will try wrist splints as she has never tried these in the past.  Dario Macias up wrist splints were sent to [facility]. Recommend trying the splints at night first to see if she has any significant benefit in her pain or numbness of her hands. Fortunately patient does have an appointment next week with pain management I recommend she keep this appointment at this time and follow-up with them as scheduled. No follow-ups on file. Medications Prescribed:  No orders of the defined types were placed in this encounter. Future Appointments   Date Time Provider Magda Ashraf   4/6/2023  1:15 PM RAMON Tracey - CNP [facility]   5/1/2023  3:40 PM Renny Menendez DO DF[facility]   7/12/2023 10:00 AM SCHEDULE, GARY PFT GARY PFT [place] [facility]   7/12/2023 11:00 AM DO JASMIN ConcepcionULM [facility]       Patient given educational materials - see patient instructions. Discussed use, benefit, and side effects of prescribed medications. All patient questions answered. Pt voiced understanding. Reviewed health maintenance. Instructed to continue current medications, diet and exercise. Patient agreed with treatment plan. Follow up as directed.      Electronically signed by Renny Menendez DO on 3/29/2023 at 4:43 PM

## 2023-04-18 DIAGNOSIS — M54.12 CERVICAL RADICULOPATHY: ICD-10-CM

## 2023-04-18 DIAGNOSIS — M79.18 MYOFASCIAL PAIN: ICD-10-CM

## 2023-04-18 RX ORDER — BUTALBITAL, ACETAMINOPHEN AND CAFFEINE 50; 325; 40 MG/1; MG/1; MG/1
1 TABLET ORAL EVERY 4 HOURS PRN
Qty: 60 TABLET | Refills: 0 | Status: SHIPPED | OUTPATIENT
Start: 2023-04-23

## 2023-04-18 NOTE — TELEPHONE ENCOUNTER
Jermain Fitzpatrick called requesting a refill of the below medication which has been pended for you:     Requested Prescriptions     Pending Prescriptions Disp Refills    butalbital-acetaminophen-caffeine (FIORICET, ESGIC) -40 MG per tablet 60 tablet 0     Sig: Take 1 tablet by mouth every 4 hours as needed for Headaches or Migraine       Last Appointment Date: 8/2/2022  Next Appointment Date: 4/28/2023    Allergies   Allergen Reactions    Norco [Hydrocodone-Acetaminophen] Other (See Comments)     Patient stated it gave her severe headaches       Pharmacy:  [address] [place] [address] [place], and [place]    03/24/23 #60. Due 04/23/23.

## 2023-04-25 ENCOUNTER — OFFICE VISIT (OUTPATIENT)
Dept: PRIMARY CARE CLINIC | Age: 38
End: 2023-04-25
Payer: MEDICAID

## 2023-04-25 VITALS
OXYGEN SATURATION: 99 % | WEIGHT: 242.2 LBS | DIASTOLIC BLOOD PRESSURE: 86 MMHG | HEIGHT: 62 IN | BODY MASS INDEX: 44.57 KG/M2 | HEART RATE: 78 BPM | TEMPERATURE: 97.4 F | SYSTOLIC BLOOD PRESSURE: 148 MMHG

## 2023-04-25 DIAGNOSIS — H66.002 NON-RECURRENT ACUTE SUPPURATIVE OTITIS MEDIA OF LEFT EAR WITHOUT SPONTANEOUS RUPTURE OF TYMPANIC MEMBRANE: Primary | ICD-10-CM

## 2023-04-25 DIAGNOSIS — J01.10 ACUTE NON-RECURRENT FRONTAL SINUSITIS: ICD-10-CM

## 2023-04-25 DIAGNOSIS — Z91.09 ENVIRONMENTAL ALLERGIES: ICD-10-CM

## 2023-04-25 PROCEDURE — 99214 OFFICE O/P EST MOD 30 MIN: CPT

## 2023-04-25 RX ORDER — AMOXICILLIN AND CLAVULANATE POTASSIUM 875; 125 MG/1; MG/1
1 TABLET, FILM COATED ORAL 2 TIMES DAILY
Qty: 20 TABLET | Refills: 0 | Status: SHIPPED | OUTPATIENT
Start: 2023-04-25 | End: 2023-05-05

## 2023-04-25 RX ORDER — PREDNISONE 20 MG/1
20 TABLET ORAL 2 TIMES DAILY
Qty: 10 TABLET | Refills: 0 | Status: SHIPPED | OUTPATIENT
Start: 2023-04-25 | End: 2023-04-30

## 2023-04-25 RX ORDER — LORATADINE 10 MG/1
10 TABLET ORAL DAILY
Qty: 30 TABLET | Refills: 0 | Status: SHIPPED | OUTPATIENT
Start: 2023-04-25 | End: 2023-05-25

## 2023-04-25 ASSESSMENT — ENCOUNTER SYMPTOMS
COUGH: 1
SHORTNESS OF BREATH: 1
NAUSEA: 0
FACIAL SWELLING: 0
EYE REDNESS: 0
SINUS PRESSURE: 1
RHINORRHEA: 0
DIARRHEA: 0
EYE WATERING: 0
SORE THROAT: 0
VOMITING: 0
SINUS PAIN: 0
BLURRED VISION: 0

## 2023-04-25 NOTE — PROGRESS NOTES
[address] [facility] In department of [address] [place] [address]  Phone: [phone]  Fax: [phone]      Nikkie Cannon is a 45 y.o. female who presents to the [facility] today for her medical conditions/complaints as noted below. Nikkie Cannon is c/o of Migraine (Migraine, moved too quickly and makes her nausea, feels like O2 is low, left eye is runny and puffy. )          HPI:     Migraine   This is a new problem. The current episode started today. The problem occurs constantly. The problem has been gradually worsening. The pain is located in the Frontal region. The pain does not radiate. The pain quality is not similar to prior headaches. The quality of the pain is described as aching. The pain is at a severity of 4/10. The pain is moderate. Associated symptoms include coughing and sinus pressure. Pertinent negatives include no blurred vision, ear pain, eye redness, eye watering, fever, nausea, neck pain, rhinorrhea, sore throat or vomiting. Associated symptoms comments: Sneezing  . Nothing aggravates the symptoms. She has tried nothing for the symptoms. The treatment provided no relief. Her past medical history is significant for migraine headaches. There is no history of migraines in the family. URI   This is a new problem. The current episode started today. The problem has been gradually worsening. There has been no fever. Associated symptoms include coughing, headaches and sneezing. Pertinent negatives include no chest pain, congestion, diarrhea, dysuria, ear pain, nausea, neck pain, rhinorrhea, sinus pain, sore throat or vomiting. Treatments tried: albuterol. The treatment provided no relief.      Past Medical History:   Diagnosis Date    Allergic rhinitis     Anemia     Asthma     Depression     Headache     Herniated intervertebral disc of lumbar spine     Shortness of breath     Tobacco abuse         Allergies   Allergen Reactions

## 2023-05-01 ENCOUNTER — OFFICE VISIT (OUTPATIENT)
Dept: PAIN MANAGEMENT | Age: 38
End: 2023-05-01
Payer: MEDICAID

## 2023-05-01 ENCOUNTER — HOSPITAL ENCOUNTER (OUTPATIENT)
Age: 38
Setting detail: SPECIMEN
Discharge: HOME | End: 2023-05-01
Payer: MEDICAID

## 2023-05-01 ENCOUNTER — OFFICE VISIT (OUTPATIENT)
Dept: FAMILY MEDICINE CLINIC | Age: 38
End: 2023-05-01
Payer: MEDICAID

## 2023-05-01 VITALS
HEART RATE: 79 BPM | OXYGEN SATURATION: 98 % | SYSTOLIC BLOOD PRESSURE: 118 MMHG | DIASTOLIC BLOOD PRESSURE: 78 MMHG | HEIGHT: 63 IN | BODY MASS INDEX: 43.77 KG/M2 | WEIGHT: 247 LBS

## 2023-05-01 VITALS
SYSTOLIC BLOOD PRESSURE: 114 MMHG | WEIGHT: 247 LBS | HEIGHT: 63 IN | OXYGEN SATURATION: 100 % | HEART RATE: 74 BPM | BODY MASS INDEX: 43.77 KG/M2 | TEMPERATURE: 97.1 F | DIASTOLIC BLOOD PRESSURE: 68 MMHG

## 2023-05-01 DIAGNOSIS — G56.03 BILATERAL CARPAL TUNNEL SYNDROME: ICD-10-CM

## 2023-05-01 DIAGNOSIS — M54.2 CERVICALGIA: ICD-10-CM

## 2023-05-01 DIAGNOSIS — Z79.891 ENCOUNTER FOR LONG-TERM OPIATE ANALGESIC USE: ICD-10-CM

## 2023-05-01 DIAGNOSIS — R73.01 ELEVATED FASTING GLUCOSE: ICD-10-CM

## 2023-05-01 DIAGNOSIS — M48.02 NEUROFORAMINAL STENOSIS OF CERVICAL SPINE: ICD-10-CM

## 2023-05-01 DIAGNOSIS — G62.9 NEUROPATHY: ICD-10-CM

## 2023-05-01 DIAGNOSIS — K29.00 ACUTE GASTRITIS, PRESENCE OF BLEEDING UNSPECIFIED, UNSPECIFIED GASTRITIS TYPE: Primary | ICD-10-CM

## 2023-05-01 DIAGNOSIS — M79.18 MYOFASCIAL PAIN: ICD-10-CM

## 2023-05-01 DIAGNOSIS — M54.12 CERVICAL RADICULOPATHY: ICD-10-CM

## 2023-05-01 DIAGNOSIS — Z02.83 ENCOUNTER FOR DRUG SCREENING: Primary | ICD-10-CM

## 2023-05-01 DIAGNOSIS — Z02.83 ENCOUNTER FOR DRUG SCREENING: ICD-10-CM

## 2023-05-01 PROCEDURE — 99213 OFFICE O/P EST LOW 20 MIN: CPT

## 2023-05-01 PROCEDURE — 99214 OFFICE O/P EST MOD 30 MIN: CPT | Performed by: STUDENT IN AN ORGANIZED HEALTH CARE EDUCATION/TRAINING PROGRAM

## 2023-05-01 PROCEDURE — G0481 DRUG TEST DEF 8-14 CLASSES: HCPCS

## 2023-05-01 PROCEDURE — 80307 DRUG TEST PRSMV CHEM ANLYZR: CPT

## 2023-05-01 PROCEDURE — 99214 OFFICE O/P EST MOD 30 MIN: CPT | Performed by: NURSE PRACTITIONER

## 2023-05-01 RX ORDER — DULOXETIN HYDROCHLORIDE 30 MG/1
30 CAPSULE, DELAYED RELEASE ORAL DAILY
Qty: 30 CAPSULE | Refills: 0 | Status: SHIPPED | OUTPATIENT
Start: 2023-05-01

## 2023-05-01 RX ORDER — OMEPRAZOLE 40 MG/1
40 CAPSULE, DELAYED RELEASE ORAL
Qty: 30 CAPSULE | Refills: 1 | Status: SHIPPED | OUTPATIENT
Start: 2023-05-01

## 2023-05-01 NOTE — PROGRESS NOTES
Subjective:      Patient ID: Bulmaro Whitt is a 45 y.o. female.   Chief Complaint   Patient presents with    Shoulder Pain     left    Hand Pain     bilateral     HPI Severe carpal tunnel symptoms    Review of Systems    Objective:   Physical Exam    Assessment / Plan:

## 2023-05-01 NOTE — PROGRESS NOTES
TENISHA Mckeon 112  [address] [address]  Dept: [phone]  Dept Fax: [phone]  Loc: [phone]      Mavis Mascorro is a 45 y.o. female who presents today for:  Chief Complaint   Patient presents with    Pneumonia     1 month f/u on pnemonia. States she is feeling better. GI Problem     Has a burning sensation in ABD        Goals         Stop Cigarette/Tobacco use (pt-stated)       Patient Self-Management Goal for Health Maintenance  Goal: I will chose a goal related to tobacco cessation:  I will think about reasons why I should quit smoking. Barriers: lack of motivation  Plan for overcoming my barriers: family support  Confidence: 3/10  Anticipated Goal Completion Date: Jan 7, 2018                HPI:     HPI  Patient is a 70-year-old female who presents to the office today for 1 month follow-up for asthma and pneumonia. States that she is feeling much better from this standpoint. Ended up with a sinus infection and was seen in the urgent care on 4/25/2023 and given another round of antibiotics. States that she otherwise feels like her symptoms have improved from a breathing standpoint. Having some issues with bilateral hand pain and numbness. This has been an ongoing issue for her. Initially thought to be carpal tunnel we tried some splints however this did not help her pain much. She states that she was seen by pain management today and they recommended repeating another nerve conduction test to see what might be going on. She states that she is in an excruciating amount of pain and has been eating ibuprofen and Aleve which has been upsetting her stomach. Feels like she needs something to help with her stomach pain at this time. She is tearful about her pain as it continues to bother her significantly including making it hard for her to sleep.     Past Medical History:   Diagnosis Date

## 2023-05-02 ASSESSMENT — ENCOUNTER SYMPTOMS
ABDOMINAL PAIN: 0
SHORTNESS OF BREATH: 0
VOMITING: 0
BLOOD IN STOOL: 0
TROUBLE SWALLOWING: 0
COUGH: 0
EYE PAIN: 0
CONSTIPATION: 0
DIARRHEA: 0
NAUSEA: 0

## 2023-05-04 ENCOUNTER — TELEPHONE (OUTPATIENT)
Dept: PAIN MANAGEMENT | Age: 38
End: 2023-05-04

## 2023-05-04 LAB
6-ACETYLMORPHINE, UR: NOT DETECTED
7-AMINOCLONAZEPAM, URINE: NOT DETECTED
ALPHA-OH-ALPRAZ, URINE: NOT DETECTED
ALPHA-OH-MIDAZOLAM, URINE: NOT DETECTED
ALPRAZOLAM, URINE: NOT DETECTED
AMPHETAMINES, URINE: NOT DETECTED
BARBITURATES, URINE: PRESENT
BENZOYLECGONINE, UR: NOT DETECTED
BUPRENORPHINE URINE: NOT DETECTED
CARISOPRODOL, UR: NOT DETECTED
CLONAZEPAM, URINE: NOT DETECTED
CODEINE, URINE: NOT DETECTED
CREATININE URINE: <20 MG/DL (ref 20–400)
DIAZEPAM, URINE: NOT DETECTED
EER PAIN MGT DRUG PANEL, HIGH RES/EMIT U: ABNORMAL
ETHYL GLUCURONIDE UR: NOT DETECTED
FENTANYL URINE: NOT DETECTED
GABAPENTIN: NOT DETECTED
HYDROCODONE, URINE: NOT DETECTED
HYDROMORPHONE, URINE: NOT DETECTED
LORAZEPAM, URINE: NOT DETECTED
MARIJUANA METAB, UR: PRESENT
MDA, UR: NOT DETECTED
MDEA, EVE, UR: NOT DETECTED
MDMA URINE: NOT DETECTED
MEPERIDINE METAB, UR: NOT DETECTED
METHADONE, URINE: NOT DETECTED
METHAMPHETAMINE, URINE: NOT DETECTED
METHYLPHENIDATE: NOT DETECTED
MIDAZOLAM, URINE: NOT DETECTED
MORPHINE URINE: NOT DETECTED
NALOXONE URINE: NOT DETECTED
NORBUPRENORPHINE, URINE: NOT DETECTED
NORDIAZEPAM, URINE: NOT DETECTED
NORFENTANYL, URINE: NOT DETECTED
NORHYDROCODONE, URINE: NOT DETECTED
NOROXYCODONE, URINE: NOT DETECTED
NOROXYMORPHONE, URINE: NOT DETECTED
OXAZEPAM, URINE: NOT DETECTED
OXYCODONE URINE: NOT DETECTED
OXYMORPHONE, URINE: NOT DETECTED
PAIN MGT DRUG PANEL, HI RES, UR: ABNORMAL
PCP,URINE: NOT DETECTED
PHENTERMINE, UR: NOT DETECTED
PREGABALIN: PRESENT
TAPENTADOL, URINE: NOT DETECTED
TAPENTADOL-O-SULFATE, URINE: NOT DETECTED
TEMAZEPAM, URINE: NOT DETECTED
TRAMADOL, URINE: NOT DETECTED
ZOLPIDEM METABOLITE (ZCA), URINE: NOT DETECTED
ZOLPIDEM, URINE: NOT DETECTED

## 2023-05-04 RX ORDER — DIAZEPAM 5 MG/1
TABLET ORAL
Qty: 2 TABLET | Refills: 0 | Status: SHIPPED | OUTPATIENT
Start: 2023-05-04 | End: 2023-06-07

## 2023-05-04 NOTE — PROGRESS NOTES
Subjective:      Patient ID: Alejo Vyas is a 45 y.o. female. Chief Complaint   Patient presents with    Shoulder Pain     left    Hand Pain     bilateral     HPI Severe carpal tunnel symptoms    Pain Assessment  Location of Pain: Shoulder  Location Modifiers: Left  Severity of Pain: 8  Quality of Pain: Throbbing, Aching, Sharp  Duration of Pain: Persistent  Frequency of Pain: Constant  Aggravating Factors: Bending, Stretching, Exercise, Straightening  Limiting Behavior: Yes  Relieving Factors: Ice, Rest, Heat  Result of Injury: No  Work-Related Injury: No    Allergies   Allergen Reactions    Norco [Hydrocodone-Acetaminophen] Other (See Comments)     Patient stated it gave her severe headaches       Outpatient Medications Marked as Taking for the 5/1/23 encounter (Office Visit) with RAMON Soliman - CNP   Medication Sig Dispense Refill    diazePAM (VALIUM) 5 MG tablet Take 1 tablet 12 hours prior to procedure and 2 hours prior to procedure 2 tablet 0    amoxicillin-clavulanate (AUGMENTIN) 875-125 MG per tablet Take 1 tablet by mouth 2 times daily for 10 days 20 tablet 0    loratadine (CLARITIN) 10 MG tablet Take 1 tablet by mouth daily 30 tablet 0    butalbital-acetaminophen-caffeine (FIORICET, ESGIC) -40 MG per tablet Take 1 tablet by mouth every 4 hours as needed for Headaches or Migraine 60 tablet 0    pregabalin (LYRICA) 200 MG capsule Take 1 capsule by mouth 3 times daily for 30 days.  90 capsule 5    ipratropium-albuterol (DUONEB) 0.5-2.5 (3) MG/3ML SOLN nebulizer solution Take 3 mLs by nebulization every 4 hours as needed for Shortness of Breath 60 mL 0    fluticasone-umeclidin-vilant (TRELEGY ELLIPTA) 100-62.5-25 MCG/ACT AEPB inhaler Inhale 1 puff into the lungs daily 60 each 1    buPROPion (ZYBAN) 150 MG extended release tablet Take 1 tablet by mouth 2 times daily 60 tablet 0    nicotine (NICODERM CQ) 14 MG/24HR Place 1 patch onto the skin daily 42 patch 0    diclofenac sodium (VOLTAREN) 1

## 2023-05-04 NOTE — TELEPHONE ENCOUNTER
----- Message from RAMON Will CNP sent at 5/4/2023  1:54 PM EDT -----  C7/T1 interlaminar, I will send Valium

## 2023-05-04 NOTE — TELEPHONE ENCOUNTER
Attempted to schedule C7-T1 IESI with no sedation (only valium). LM#1     Valium escribe to pharmacy  by provider.

## 2023-05-05 ASSESSMENT — ENCOUNTER SYMPTOMS
GASTROINTESTINAL NEGATIVE: 1
RESPIRATORY NEGATIVE: 1
EYES NEGATIVE: 1

## 2023-05-10 DIAGNOSIS — M79.18 MYOFASCIAL PAIN: ICD-10-CM

## 2023-05-10 DIAGNOSIS — M54.12 CERVICAL RADICULOPATHY: ICD-10-CM

## 2023-05-11 RX ORDER — BUTALBITAL, ACETAMINOPHEN AND CAFFEINE 50; 325; 40 MG/1; MG/1; MG/1
TABLET ORAL
Qty: 60 TABLET | Refills: 0 | Status: SHIPPED | OUTPATIENT
Start: 2023-05-11

## 2023-05-11 NOTE — TELEPHONE ENCOUNTER
Fioricet -40 mg  Last Appt:  5/1/2023  Next Appt:   Visit date not found  Med verified in [facility]

## 2023-05-23 ENCOUNTER — TELEPHONE (OUTPATIENT)
Dept: PAIN MANAGEMENT | Age: 38
End: 2023-05-23

## 2023-05-23 DIAGNOSIS — G56.03 BILATERAL CARPAL TUNNEL SYNDROME: Primary | ICD-10-CM

## 2023-05-23 NOTE — TELEPHONE ENCOUNTER
Patient's EMG was scheduled for 07/13/23,  and the EMG order expires before then. A new order will need put in.

## 2023-05-31 DIAGNOSIS — G62.9 NEUROPATHY: ICD-10-CM

## 2023-06-01 ENCOUNTER — TELEPHONE (OUTPATIENT)
Dept: FAMILY MEDICINE CLINIC | Age: 38
End: 2023-06-01

## 2023-06-01 NOTE — TELEPHONE ENCOUNTER
Attempted to reach patient regarding missed appointment on 06/01/2023. Unable to contact at this time. Voicemail is full will send a letter to reschedule.

## 2023-06-08 NOTE — TELEPHONE ENCOUNTER
Medication just started on 5/1/23. Patient no showed miugel on 6/1. Attempted to contact patient, unable to leave VM. Patient needs to reschedule appt. Also need to see how medication is working for patient.

## 2023-06-09 RX ORDER — DULOXETIN HYDROCHLORIDE 30 MG/1
CAPSULE, DELAYED RELEASE ORAL
Qty: 30 CAPSULE | Refills: 0 | OUTPATIENT
Start: 2023-06-09

## 2023-06-10 DIAGNOSIS — G62.9 NEUROPATHY: ICD-10-CM

## 2023-06-12 NOTE — TELEPHONE ENCOUNTER
Call to pt, no answer. Unable to leave message. Need to see how pt is doing on Cymbalta and also r/s office visit.

## 2023-06-14 RX ORDER — DULOXETIN HYDROCHLORIDE 30 MG/1
CAPSULE, DELAYED RELEASE ORAL
Qty: 30 CAPSULE | Refills: 0 | OUTPATIENT
Start: 2023-06-14

## 2023-06-29 DIAGNOSIS — J45.40 MODERATE PERSISTENT ASTHMA WITHOUT COMPLICATION: Primary | ICD-10-CM

## 2023-06-30 DIAGNOSIS — M48.02 NEUROFORAMINAL STENOSIS OF CERVICAL SPINE: ICD-10-CM

## 2023-06-30 DIAGNOSIS — M54.12 CERVICAL RADICULOPATHY: ICD-10-CM

## 2023-06-30 DIAGNOSIS — M79.18 MYOFASCIAL PAIN: ICD-10-CM

## 2023-06-30 RX ORDER — TRAMADOL HYDROCHLORIDE 50 MG/1
50 TABLET ORAL 2 TIMES DAILY PRN
Qty: 60 TABLET | Refills: 2 | OUTPATIENT
Start: 2023-06-30 | End: 2023-09-28

## 2023-06-30 RX ORDER — BUTALBITAL, ACETAMINOPHEN AND CAFFEINE 50; 325; 40 MG/1; MG/1; MG/1
1 TABLET ORAL EVERY 4 HOURS PRN
Qty: 60 TABLET | Refills: 5 | OUTPATIENT
Start: 2023-06-30

## 2023-07-11 DIAGNOSIS — J45.51 SEVERE PERSISTENT ASTHMA WITH ACUTE EXACERBATION: ICD-10-CM

## 2023-07-11 DIAGNOSIS — G62.9 NEUROPATHY: ICD-10-CM

## 2023-07-11 RX ORDER — FLUTICASONE FUROATE, UMECLIDINIUM BROMIDE AND VILANTEROL TRIFENATATE 100; 62.5; 25 UG/1; UG/1; UG/1
POWDER RESPIRATORY (INHALATION)
Qty: 60 EACH | Refills: 0 | Status: SHIPPED | OUTPATIENT
Start: 2023-07-11 | End: 2023-07-12 | Stop reason: ALTCHOICE

## 2023-07-11 RX ORDER — DULOXETIN HYDROCHLORIDE 30 MG/1
CAPSULE, DELAYED RELEASE ORAL
Qty: 30 CAPSULE | Refills: 0 | Status: SHIPPED | OUTPATIENT
Start: 2023-07-11

## 2023-07-11 NOTE — TELEPHONE ENCOUNTER
Camilo Goode called requesting a refill of the below medication which has been pended for you:     Requested Prescriptions     Pending Prescriptions Disp [address] 100-62.5-25 MCG/ACT AEPB inhaler [Pharmacy Med Name: Pamla Caprice 100-62.5-25 MCG/INH Inhalation Aerosol Powder Breath Activated] 60 each 0     Sig: INHALE 1 PUFF ONCE DAILY       Last Appointment Date: 5/1/2023  Next Appointment Date: 7/20/2023    Allergies   Allergen Reactions    Norco [Hydrocodone-Acetaminophen] Other (See Comments)     Patient stated it gave her severe headaches

## 2023-07-11 NOTE — TELEPHONE ENCOUNTER
Had a warning about 2 meds interacting -please advise    Vernel Dakins  called requesting a refill of the below medication which has been pended for you:     Requested Prescriptions     Pending Prescriptions Disp Refills    DULoxetine (CYMBALTA) 30 MG extended release capsule [Pharmacy Med Name: DULoxetine HCl 30 MG Oral Capsule Delayed Release Particles] 30 capsule 0     Sig: Take 1 capsule by mouth once daily       Allergies   Allergen Reactions    Norco [Hydrocodone-Acetaminophen] Other (See Comments)     Patient stated it gave her severe headaches               Last Appointment:  5/1/2023   Next Appointment:  Visit date not found

## 2023-07-12 ENCOUNTER — HOSPITAL ENCOUNTER (OUTPATIENT)
Dept: PULMONOLOGY | Age: 38
Discharge: HOME | End: 2023-07-12
Payer: MEDICAID

## 2023-07-12 ENCOUNTER — TELEPHONE (OUTPATIENT)
Dept: PAIN MANAGEMENT | Age: 38
End: 2023-07-12

## 2023-07-12 ENCOUNTER — OFFICE VISIT (OUTPATIENT)
Dept: PULMONOLOGY | Age: 38
End: 2023-07-12
Payer: MEDICAID

## 2023-07-12 VITALS
OXYGEN SATURATION: 98 % | WEIGHT: 232 LBS | HEIGHT: 63 IN | TEMPERATURE: 98.2 F | BODY MASS INDEX: 41.11 KG/M2 | SYSTOLIC BLOOD PRESSURE: 118 MMHG | HEART RATE: 58 BPM | DIASTOLIC BLOOD PRESSURE: 76 MMHG

## 2023-07-12 DIAGNOSIS — G56.03 BILATERAL CARPAL TUNNEL SYNDROME: Primary | ICD-10-CM

## 2023-07-12 DIAGNOSIS — R06.09 POST-COVID CHRONIC DYSPNEA: Primary | ICD-10-CM

## 2023-07-12 DIAGNOSIS — J82.83 EOSINOPHILIC ASTHMA: ICD-10-CM

## 2023-07-12 DIAGNOSIS — U09.9 POST-COVID CHRONIC DYSPNEA: Primary | ICD-10-CM

## 2023-07-12 DIAGNOSIS — J45.40 MODERATE PERSISTENT ASTHMA WITHOUT COMPLICATION: ICD-10-CM

## 2023-07-12 DIAGNOSIS — J45.20 MILD INTERMITTENT ASTHMA WITHOUT COMPLICATION: ICD-10-CM

## 2023-07-12 DIAGNOSIS — J20.9 ACUTE BRONCHITIS, UNSPECIFIED ORGANISM: ICD-10-CM

## 2023-07-12 DIAGNOSIS — E66.01 CLASS 3 SEVERE OBESITY DUE TO EXCESS CALORIES WITH SERIOUS COMORBIDITY AND BODY MASS INDEX (BMI) OF 40.0 TO 44.9 IN ADULT (HCC): ICD-10-CM

## 2023-07-12 DIAGNOSIS — Z91.09 MULTIPLE ENVIRONMENTAL ALLERGIES: ICD-10-CM

## 2023-07-12 DIAGNOSIS — J45.50 POORLY CONTROLLED SEVERE PERSISTENT ASTHMA WITHOUT COMPLICATION: ICD-10-CM

## 2023-07-12 DIAGNOSIS — F17.210 SMOKING GREATER THAN 20 PACK YEARS: ICD-10-CM

## 2023-07-12 LAB
DLCO %PRED: NORMAL
DLCO PRED: NORMAL
DLCO/VA %PRED: NORMAL
DLCO/VA PRED: NORMAL
DLCO/VA: NORMAL
DLCO: NORMAL
EXPIRATORY TIME-POST: NORMAL
EXPIRATORY TIME: NORMAL
FEF 25-75% %CHNG: NORMAL
FEF 25-75% %PRED-POST: NORMAL
FEF 25-75% %PRED-PRE: NORMAL
FEF 25-75% PRED: NORMAL
FEF 25-75%-POST: NORMAL
FEF 25-75%-PRE: NORMAL
FEV1 %PRED-POST: NORMAL
FEV1 %PRED-PRE: NORMAL
FEV1 PRED: NORMAL
FEV1-POST: NORMAL
FEV1-PRE: NORMAL
FEV1/FVC %PRED-POST: NORMAL
FEV1/FVC %PRED-PRE: NORMAL
FEV1/FVC PRED: NORMAL
FEV1/FVC-POST: NORMAL
FEV1/FVC-PRE: NORMAL
FVC %PRED-POST: NORMAL
FVC %PRED-PRE: NORMAL
FVC PRED: NORMAL
FVC-POST: NORMAL
FVC-PRE: NORMAL
GAW %PRED: NORMAL
GAW PRED: NORMAL
GAW: NORMAL
IC %PRED: NORMAL
IC PRED: NORMAL
IC: NORMAL
MEP: NORMAL
MIP: NORMAL
MVV %PRED-PRE: NORMAL
MVV PRED: NORMAL
MVV-PRE: NORMAL
PEF %PRED-POST: NORMAL
PEF %PRED-PRE: NORMAL
PEF PRED: NORMAL
PEF%CHNG: NORMAL
PEF-POST: NORMAL
PEF-PRE: NORMAL
RAW %PRED: NORMAL
RAW PRED: NORMAL
RAW: NORMAL
RV %PRED: NORMAL
RV PRED: NORMAL
RV: NORMAL
SVC %PRED: NORMAL
SVC PRED: NORMAL
SVC: NORMAL
TLC %PRED: NORMAL
TLC PRED: NORMAL
TLC: NORMAL
VA %PRED: NORMAL
VA PRED: NORMAL
VA: NORMAL
VTG %PRED: NORMAL
VTG PRED: NORMAL
VTG: NORMAL

## 2023-07-12 PROCEDURE — 99213 OFFICE O/P EST LOW 20 MIN: CPT | Performed by: INTERNAL MEDICINE

## 2023-07-12 PROCEDURE — 99205 OFFICE O/P NEW HI 60 MIN: CPT | Performed by: INTERNAL MEDICINE

## 2023-07-12 PROCEDURE — 6370000000 HC RX 637 (ALT 250 FOR IP): Performed by: INTERNAL MEDICINE

## 2023-07-12 PROCEDURE — 94640 AIRWAY INHALATION TREATMENT: CPT

## 2023-07-12 PROCEDURE — 94060 EVALUATION OF WHEEZING: CPT

## 2023-07-12 PROCEDURE — 94726 PLETHYSMOGRAPHY LUNG VOLUMES: CPT

## 2023-07-12 PROCEDURE — 94729 DIFFUSING CAPACITY: CPT

## 2023-07-12 RX ORDER — NICOTINE 21 MG/24HR
1 PATCH, TRANSDERMAL 24 HOURS TRANSDERMAL EVERY 24 HOURS
Qty: 30 PATCH | Refills: 3 | Status: SHIPPED | OUTPATIENT
Start: 2023-07-12 | End: 2024-07-11

## 2023-07-12 RX ORDER — MONTELUKAST SODIUM 10 MG/1
10 TABLET ORAL NIGHTLY
Qty: 30 TABLET | Refills: 11 | Status: SHIPPED | OUTPATIENT
Start: 2023-07-12

## 2023-07-12 RX ORDER — ALBUTEROL SULFATE 90 UG/1
4 AEROSOL, METERED RESPIRATORY (INHALATION) ONCE
Status: COMPLETED | OUTPATIENT
Start: 2023-07-12 | End: 2023-07-12

## 2023-07-12 RX ORDER — ALBUTEROL SULFATE 90 UG/1
2 AEROSOL, METERED RESPIRATORY (INHALATION) EVERY 6 HOURS PRN
Qty: 1 EACH | Refills: 11 | Status: SHIPPED | OUTPATIENT
Start: 2023-07-12

## 2023-07-12 RX ORDER — ALBUTEROL SULFATE 2.5 MG/3ML
2.5 SOLUTION RESPIRATORY (INHALATION) EVERY 6 HOURS PRN
Qty: 120 EACH | Refills: 11 | Status: SHIPPED | OUTPATIENT
Start: 2023-07-12

## 2023-07-12 RX ORDER — FLUTICASONE FUROATE, UMECLIDINIUM BROMIDE AND VILANTEROL TRIFENATATE 200; 62.5; 25 UG/1; UG/1; UG/1
1 POWDER RESPIRATORY (INHALATION) DAILY
Qty: 1 EACH | Refills: 11 | Status: SHIPPED | OUTPATIENT
Start: 2023-07-12

## 2023-07-12 RX ADMIN — ALBUTEROL SULFATE 4 PUFF: 90 AEROSOL, METERED RESPIRATORY (INHALATION) at 10:47

## 2023-07-12 ASSESSMENT — ENCOUNTER SYMPTOMS
CHEST TIGHTNESS: 1
WHEEZING: 1
GASTROINTESTINAL NEGATIVE: 1
BACK PAIN: 1
COUGH: 1
EYES NEGATIVE: 1
SHORTNESS OF BREATH: 1

## 2023-07-12 NOTE — PROGRESS NOTES
orders placed or performed during the hospital encounter of 07/12/23   Full PFT Study With Bronchodilator   Result Value Ref Range    FVC-Pre      FVC Pred      FVC %Pred-Pre      PVC-Post      FVC %Pred-Post      FEV1-Pre      FEV1 Pred      FEV1 %Pred-Pre      FEV1-Post      FEV1 %Pred-Post      FEV1/FVC-Pre      FEV1/FVC Pred      FEV1/FVC %Pred-Pre      FEV1/FVC-Post      FEV1/FVC %Pred-Post      FEF 25-75%-Pre      FEF 25-75% Pred      FEF 25-75% %Pred-Pre      FEF 25-75%-Post      FEF 25-75% %Pred-Post      FEF 25-75% %Change      Expiratory Time      Expiratory Time-Post      PEF-Pre      PEF Pred      PEF %Pred-Pre      PEF-Post      PEF %Pred-Post      PEF %Change      MVV-Pre      MVV Pred      MVV %Pred-Pre      DLCO      DLCO Pred      DLCO %Pred      DLCO/VA      DLCO/VA Pred      DLCO/VA %Pred      VA      VA Pred      VA %Pred      Raw      Raw Pred      Raw %Pred      Gaw      GAW PRED      Gaw %Pred      SVC      SVC Pred      SVC %Pred      TLC      TLC Pred      TLC %Pred      RV      RV Pred      RV %Pred      IC      IC Pred      IC %Pred      VTG      VTG Pred      VTG %Pred      MIP      MEP         Assessment:      1. Post-COVID chronic dyspnea    2. Poorly controlled severe persistent asthma without complication    3. Class 3 severe obesity due to excess calories with serious comorbidity and body mass index (BMI) of 40.0 to 44.9 in adult ([address] [facility])    4. Eosinophilic asthma    5. Multiple environmental allergies    6. Smoking greater than 20 pack years    7. Acute bronchitis, unspecified organism    8.  Mild intermittent asthma without complication      Patient Active Problem List   Diagnosis    Tobacco abuse    Depression    Dysmenorrhea    Vitamin D deficiency    Environmental allergies    Moderate asthma    Cervical radiculopathy    Cervicalgia    Myofascial pain    Neuroforaminal stenosis of cervical spine    Migraine without aura and with status migrainosus, not intractable    Acute psychosis

## 2023-07-13 NOTE — PROCEDURES
[address] [facility] [address]                 [address], [place] [address]                               PULMONARY FUNCTION    PATIENT NAME: Kristina Ibarra                    :        1985  MED REC NO:   [id number]                             ROOM:  ACCOUNT NO:   [de-identified]                           ADMIT DATE: 2023  PROVIDER:     Crista Harada Tita    DATE OF PROCEDURE:  2023    INTERPRETATION:  Spirometry is normal.  No significant bronchospastic  component is identified. FVC 3.83 liters (110%), FEV1 2.92 liters  (101%), FEV1/FVC 76%. Static lung volumes demonstrate an increase in  RV. Thoracic gas volume and TLC are normal.  RV 2.01 liters (139%), TLC  6.00 liters (126%), RV/TLC 33%. Diffusion capacity normal.  DLCO 24.69  (114%). Airways resistance is increased. IMPRESSION:  Overall, normal study.         Jerri Burnett    D: 2023 18:04:33       T: 2023 18:56:30     [email or web address]  Job#: [id number]     Doc#: [id number]

## 2023-07-18 DIAGNOSIS — Z91.09 ENVIRONMENTAL ALLERGIES: ICD-10-CM

## 2023-07-18 RX ORDER — FLUTICASONE PROPIONATE 50 MCG
SPRAY, SUSPENSION (ML) NASAL
Qty: 16 G | Refills: 3 | OUTPATIENT
Start: 2023-07-18

## 2023-07-25 DIAGNOSIS — Z91.09 ENVIRONMENTAL ALLERGIES: ICD-10-CM

## 2023-07-25 RX ORDER — LORATADINE 10 MG/1
10 TABLET ORAL DAILY
Qty: 30 TABLET | Refills: 0 | Status: SHIPPED | OUTPATIENT
Start: 2023-07-25 | End: 2023-08-24

## 2023-07-25 NOTE — TELEPHONE ENCOUNTER
Gilbert River called requesting a refill of the below medication which has been pended for you:     Requested Prescriptions     Pending Prescriptions Disp Refills    loratadine (CLARITIN) 10 MG tablet 30 tablet 0     Sig: Take 1 tablet by mouth daily       Last Appointment Date: 5/1/2023  Next Appointment Date: 8/23/2023    Allergies   Allergen Reactions    Norco [Hydrocodone-Acetaminophen] Other (See Comments)     Patient stated it gave her severe headaches

## 2023-08-10 DIAGNOSIS — K29.00 ACUTE GASTRITIS, PRESENCE OF BLEEDING UNSPECIFIED, UNSPECIFIED GASTRITIS TYPE: ICD-10-CM

## 2023-08-10 NOTE — TELEPHONE ENCOUNTER
Brittany Parker called requesting a refill of the below medication which has been pended for you:     Requested Prescriptions     Pending Prescriptions Disp Refills    omeprazole (PRILOSEC) 40 MG delayed release capsule 30 capsule 1     Sig: Take 1 capsule by mouth every morning (before breakfast)       Last Appointment Date: 5/1/2023  Next Appointment Date: 8/23/2023    Allergies   Allergen Reactions    Norco [Hydrocodone-Acetaminophen] Other (See Comments)     Patient stated it gave her severe headaches

## 2023-08-11 RX ORDER — OMEPRAZOLE 40 MG/1
40 CAPSULE, DELAYED RELEASE ORAL
Qty: 30 CAPSULE | Refills: 1 | Status: SHIPPED | OUTPATIENT
Start: 2023-08-11

## 2023-08-17 ENCOUNTER — HOSPITAL ENCOUNTER (EMERGENCY)
Age: 38
Discharge: HOME | End: 2023-08-17
Attending: EMERGENCY MEDICINE
Payer: MEDICAID

## 2023-08-17 VITALS
WEIGHT: 220 LBS | HEART RATE: 78 BPM | OXYGEN SATURATION: 99 % | DIASTOLIC BLOOD PRESSURE: 70 MMHG | TEMPERATURE: 98.3 F | RESPIRATION RATE: 18 BRPM | HEIGHT: 62 IN | SYSTOLIC BLOOD PRESSURE: 132 MMHG | BODY MASS INDEX: 40.48 KG/M2

## 2023-08-17 DIAGNOSIS — G43.001 MIGRAINE WITHOUT AURA AND WITH STATUS MIGRAINOSUS, NOT INTRACTABLE: Primary | ICD-10-CM

## 2023-08-17 PROCEDURE — 6370000000 HC RX 637 (ALT 250 FOR IP): Performed by: EMERGENCY MEDICINE

## 2023-08-17 PROCEDURE — 6360000002 HC RX W HCPCS: Performed by: EMERGENCY MEDICINE

## 2023-08-17 PROCEDURE — 96365 THER/PROPH/DIAG IV INF INIT: CPT

## 2023-08-17 PROCEDURE — 99284 EMERGENCY DEPT VISIT MOD MDM: CPT

## 2023-08-17 PROCEDURE — 96372 THER/PROPH/DIAG INJ SC/IM: CPT

## 2023-08-17 PROCEDURE — 96375 TX/PRO/DX INJ NEW DRUG ADDON: CPT

## 2023-08-17 PROCEDURE — 6360000002 HC RX W HCPCS

## 2023-08-17 RX ORDER — DIPHENHYDRAMINE HYDROCHLORIDE 50 MG/ML
INJECTION INTRAMUSCULAR; INTRAVENOUS
Status: COMPLETED
Start: 2023-08-17 | End: 2023-08-17

## 2023-08-17 RX ORDER — DIPHENHYDRAMINE HYDROCHLORIDE 50 MG/ML
25 INJECTION INTRAMUSCULAR; INTRAVENOUS EVERY 6 HOURS PRN
Status: DISCONTINUED | OUTPATIENT
Start: 2023-08-17 | End: 2023-08-17 | Stop reason: HOSPADM

## 2023-08-17 RX ORDER — KETOROLAC TROMETHAMINE 30 MG/ML
15 INJECTION, SOLUTION INTRAMUSCULAR; INTRAVENOUS ONCE
Status: COMPLETED | OUTPATIENT
Start: 2023-08-17 | End: 2023-08-17

## 2023-08-17 RX ORDER — MAGNESIUM SULFATE IN WATER 40 MG/ML
4000 INJECTION, SOLUTION INTRAVENOUS ONCE
Status: COMPLETED | OUTPATIENT
Start: 2023-08-17 | End: 2023-08-17

## 2023-08-17 RX ORDER — KETOROLAC TROMETHAMINE 30 MG/ML
INJECTION, SOLUTION INTRAMUSCULAR; INTRAVENOUS
Status: COMPLETED
Start: 2023-08-17 | End: 2023-08-17

## 2023-08-17 RX ORDER — SUMATRIPTAN 6 MG/.5ML
6 INJECTION, SOLUTION SUBCUTANEOUS ONCE
Status: COMPLETED | OUTPATIENT
Start: 2023-08-17 | End: 2023-08-17

## 2023-08-17 RX ORDER — METOCLOPRAMIDE HYDROCHLORIDE 5 MG/ML
10 INJECTION INTRAMUSCULAR; INTRAVENOUS ONCE
Status: COMPLETED | OUTPATIENT
Start: 2023-08-17 | End: 2023-08-17

## 2023-08-17 RX ADMIN — METOCLOPRAMIDE 10 MG: 5 INJECTION, SOLUTION INTRAMUSCULAR; INTRAVENOUS at 12:15

## 2023-08-17 RX ADMIN — DIPHENHYDRAMINE HYDROCHLORIDE 25 MG: 50 INJECTION, SOLUTION INTRAMUSCULAR; INTRAVENOUS at 12:15

## 2023-08-17 RX ADMIN — SUMATRIPTAN 6 MG: 6 INJECTION, SOLUTION SUBCUTANEOUS at 12:36

## 2023-08-17 RX ADMIN — MAGNESIUM SULFATE HEPTAHYDRATE 4000 MG: 40 INJECTION, SOLUTION INTRAVENOUS at 12:16

## 2023-08-17 RX ADMIN — KETOROLAC TROMETHAMINE 15 MG: 30 INJECTION, SOLUTION INTRAMUSCULAR; INTRAVENOUS at 12:15

## 2023-08-17 RX ADMIN — DIPHENHYDRAMINE HYDROCHLORIDE 25 MG: 50 INJECTION INTRAMUSCULAR; INTRAVENOUS at 12:15

## 2023-08-17 ASSESSMENT — PAIN DESCRIPTION - LOCATION: LOCATION: HEAD

## 2023-08-17 ASSESSMENT — PAIN SCALES - GENERAL
PAINLEVEL_OUTOF10: 10
PAINLEVEL_OUTOF10: 4
PAINLEVEL_OUTOF10: 10
PAINLEVEL_OUTOF10: 4

## 2023-08-17 ASSESSMENT — PAIN DESCRIPTION - PAIN TYPE: TYPE: ACUTE PAIN

## 2023-08-17 ASSESSMENT — PAIN - FUNCTIONAL ASSESSMENT
PAIN_FUNCTIONAL_ASSESSMENT: 0-10
PAIN_FUNCTIONAL_ASSESSMENT: 0-10

## 2023-08-17 NOTE — ED NOTES
PT DISCHARGED AMBULATORY IN SATISFACTORY CONDITION WITH FRIEND DRIVING     Fam Diaz RN  08/17/23 8894

## 2023-08-17 NOTE — ED NOTES
Writer in to check on pt and administer medications. Pt resting in bed with eyes closed. She asked if she could leave. Writer informed her she needed to wait until her medications were finished infusing and her ride had to return to bring her home. Pt verbalized understanding. No further questions or concerns voiced at this time.      Dacia Cast RN  08/17/23 3674

## 2023-08-17 NOTE — ED NOTES
WRITTEN DISCHARGE INSTRUCTIONS GIVEN TO PT AND FRIEND. BOTH VERBALIZES UNDERSTANDING, ADVISED FRIEND TO DRIVE PT HOME AND SHE IS RESPONSIBLE FOR THE PT. FRIEND ABHIJIT VERBALIZES UNDERSTANDING.   WORK NOTE GIVEN TO RETURN TO WORK ON 8/18/23, ADVISED IF MORE TIME IS NEEDED, NOTE TO COME FROM PCP     Meka Sanchez RN  08/17/23 4908

## 2023-08-17 NOTE — ED NOTES
Pt friend Federico Cantu at bedside. Advised Joy that pt can not drive and she is responsible for her.  Bailey Heart RN  08/17/23 6030

## 2023-08-17 NOTE — ED NOTES
Pt arrives to ER per self, from work c/o migraine. Pain started 8/16/23 in the evening, pt took her prescribed migraine medications, went to work this morning and continues to not feel well.  Denies nausea and vomiting,  advised  will need to call for a ride      Ravi Olivera, [address]  08/17/23 9113

## 2023-08-17 NOTE — DISCHARGE INSTRUCTIONS
Please no drinking alcohol or driving in the next 6 hours  Please stay in a dark place today  Patient return if worse or new symptoms

## 2023-08-21 ENCOUNTER — TELEPHONE (OUTPATIENT)
Dept: FAMILY MEDICINE CLINIC | Age: 38
End: 2023-08-21

## 2023-08-21 NOTE — TELEPHONE ENCOUNTER
Attempted to contact patient regarding appt on 8/23/23. Need to reschedule appt d/t JS being out of office. Will also send patient a [employer] message.

## 2023-09-08 ENCOUNTER — HOSPITAL ENCOUNTER (EMERGENCY)
Age: 38
Discharge: HOME | End: 2023-09-08
Attending: EMERGENCY MEDICINE

## 2023-09-08 VITALS
DIASTOLIC BLOOD PRESSURE: 99 MMHG | HEIGHT: 61 IN | SYSTOLIC BLOOD PRESSURE: 141 MMHG | HEART RATE: 102 BPM | OXYGEN SATURATION: 98 % | BODY MASS INDEX: 41.57 KG/M2 | TEMPERATURE: 97.9 F | RESPIRATION RATE: 20 BRPM

## 2023-09-08 DIAGNOSIS — E87.6 HYPOKALEMIA: ICD-10-CM

## 2023-09-08 DIAGNOSIS — R19.7 NAUSEA VOMITING AND DIARRHEA: Primary | ICD-10-CM

## 2023-09-08 DIAGNOSIS — G89.29 OTHER CHRONIC PAIN: ICD-10-CM

## 2023-09-08 DIAGNOSIS — R11.2 NAUSEA VOMITING AND DIARRHEA: Primary | ICD-10-CM

## 2023-09-08 LAB
ALBUMIN SERPL BCP-MCNC: 4.3 GM/DL (ref 3.4–5)
ALP SERPL-CCNC: 70 U/L (ref 46–116)
ALT SERPL W P-5'-P-CCNC: 20 U/L (ref 14–63)
AMORPH SED URNS QL MICRO: ABNORMAL
AMPHETAMINE+METHAMPHETAMIE: NEGATIVE
ANION GAP SERPL CALC-SCNC: 11 MEQ/L (ref 8–16)
AST SERPL W P-5'-P-CCNC: 13 U/L (ref 15–37)
BACTERIA URNS QL MICRO: ABNORMAL
BARBITURATES: NEGATIVE
BASOPHILS # BLD: 0.6 % (ref 0–3)
BASOPHILS ABSOLUTE: 0.1 THOU/MM3 (ref 0–0.1)
BENZODIAZEPINES: NEGATIVE
BILIRUB SERPL-MCNC: 0.5 MG/DL (ref 0.2–1)
BILIRUB UR QL STRIP.AUTO: NEGATIVE
BUN SERPL-MCNC: 9 MG/DL (ref 7–18)
CALCIUM SERPL-MCNC: 9.2 MG/DL (ref 8.5–10.1)
CANNABINOIDS: NORMAL
CASTS #/AREA URNS LPF: ABNORMAL /LPF
CHARACTER UR: ABNORMAL
CHLORIDE SERPL-SCNC: 100 MEQ/L (ref 98–107)
CO2 SERPL-SCNC: 31 MEQ/L (ref 21–32)
COCAINE METABOLITE: NEGATIVE
COLOR UR AUTO: YELLOW
CREAT SERPL-MCNC: 0.7 MG/DL (ref 0.6–1.3)
CRYSTALS VITF MICRO: ABNORMAL
EOSINOPHILS ABSOLUTE: 0.3 THOU/MM3 (ref 0–0.5)
EOSINOPHILS RELATIVE PERCENT: 2.2 % (ref 0–4)
EPI CELLS #/AREA URNS HPF: ABNORMAL /HPF
GFR SERPL CREATININE-BSD FRML MDRD: > 60 ML/MIN/1.73M2
GLUCOSE SERPL-MCNC: 111 MG/DL (ref 74–106)
GLUCOSE UR QL STRIP.AUTO: NEGATIVE MG/DL
HCT VFR BLD CALC: 44.1 % (ref 37–47)
HEMOGLOBIN: 15.3 GM/DL (ref 12–16)
HGB UR STRIP.AUTO-MCNC: NEGATIVE MG/L
IMMATURE GRANS (ABS): 0.03 THOU/MM3 (ref 0–0.07)
IMMATURE GRANULOCYTES: 0 %
KETONES UR QL STRIP.AUTO: NEGATIVE
LEUKOCYTE ESTERASE UR QL STRIP.AUTO: ABNORMAL
LYMPHOCYTES # BLD AUTO: 19.3 % (ref 15–47)
LYMPHOCYTES ABSOLUTE: 2.2 THOU/MM3 (ref 1–4.8)
MCH RBC QN AUTO: 30.5 PG (ref 26–32)
MCHC RBC AUTO-ENTMCNC: 34.7 GM/DL (ref 31–35)
MCV RBC AUTO: 88 FL (ref 81–99)
MONOCYTES: 1.1 THOU/MM3 (ref 0.3–1.3)
MONOCYTES: 9.9 % (ref 0–12)
MUCOUS THREADS URNS QL MICRO: ABNORMAL
NITRITE UR QL STRIP.AUTO: NEGATIVE
OPIATES: NEGATIVE
OXYCODONE: NEGATIVE
PDW BLD-RTO: 13.7 % (ref 11.5–14.9)
PH UR STRIP.AUTO: 7 [PH] (ref 5–9)
PHENCYCLIDINE: NEGATIVE
PLATELET # BLD AUTO: 354 THOU/MM3 (ref 130–400)
PMV BLD AUTO: 9.7 FL (ref 9.4–12.4)
POTASSIUM SERPL-SCNC: 2.7 MEQ/L (ref 3.5–5.1)
PROT SERPL-MCNC: 7.3 GM/DL (ref 6.4–8.2)
PROT UR STRIP.AUTO-MCNC: NEGATIVE MG/DL
RBC # BLD: 5.01 MILL/MM3 (ref 4.1–5.3)
RBC #/AREA URNS HPF: ABNORMAL /HPF
REFLEX TO URINE C & S: ABNORMAL
SARS-COV-2 RDRP RESP QL NAA+PROBE: NOT  DETECTED
SEG NEUTROPHILS: 67.7 % (ref 43–75)
SEGMENTED NEUTROPHILS ABSOLUTE COUNT: 7.8 THOU/MM3 (ref 1.8–7.7)
SODIUM SERPL-SCNC: 142 MEQ/L (ref 136–145)
SP GR UR STRIP.AUTO: 1.01 (ref 1–1.03)
TSH SERPL DL<=0.005 MIU/L-ACNC: 1.78 UIU/ML (ref 0.4–4.2)
UROBILINOGEN UR STRIP-ACNC: 0.2 EU/DL (ref 0–1)
WBC # BLD: 11.5 THOU/MM3 (ref 4.8–10.8)
WBC # UR STRIP.AUTO: ABNORMAL /HPF

## 2023-09-08 PROCEDURE — 6360000002 HC RX W HCPCS: Performed by: EMERGENCY MEDICINE

## 2023-09-08 PROCEDURE — 80305 DRUG TEST PRSMV DIR OPT OBS: CPT

## 2023-09-08 PROCEDURE — 84443 ASSAY THYROID STIM HORMONE: CPT

## 2023-09-08 PROCEDURE — 87507 IADNA-DNA/RNA PROBE TQ 12-25: CPT

## 2023-09-08 PROCEDURE — 96374 THER/PROPH/DIAG INJ IV PUSH: CPT

## 2023-09-08 PROCEDURE — 96375 TX/PRO/DX INJ NEW DRUG ADDON: CPT

## 2023-09-08 PROCEDURE — 80053 COMPREHEN METABOLIC PANEL: CPT

## 2023-09-08 PROCEDURE — 87635 SARS-COV-2 COVID-19 AMP PRB: CPT

## 2023-09-08 PROCEDURE — 6370000000 HC RX 637 (ALT 250 FOR IP): Performed by: EMERGENCY MEDICINE

## 2023-09-08 PROCEDURE — 85025 COMPLETE CBC W/AUTO DIFF WBC: CPT

## 2023-09-08 PROCEDURE — 2580000003 HC RX 258: Performed by: EMERGENCY MEDICINE

## 2023-09-08 PROCEDURE — 99284 EMERGENCY DEPT VISIT MOD MDM: CPT

## 2023-09-08 PROCEDURE — 81001 URINALYSIS AUTO W/SCOPE: CPT

## 2023-09-08 RX ORDER — LOPERAMIDE HYDROCHLORIDE 2 MG/1
2 CAPSULE ORAL 4 TIMES DAILY PRN
Qty: 10 CAPSULE | Refills: 0 | Status: SHIPPED | OUTPATIENT
Start: 2023-09-08

## 2023-09-08 RX ORDER — 0.9 % SODIUM CHLORIDE 0.9 %
1000 INTRAVENOUS SOLUTION INTRAVENOUS ONCE
Status: COMPLETED | OUTPATIENT
Start: 2023-09-08 | End: 2023-09-08

## 2023-09-08 RX ORDER — POTASSIUM CHLORIDE 750 MG/1
40 TABLET, FILM COATED, EXTENDED RELEASE ORAL ONCE
Status: COMPLETED | OUTPATIENT
Start: 2023-09-08 | End: 2023-09-08

## 2023-09-08 RX ORDER — ONDANSETRON 2 MG/ML
4 INJECTION INTRAMUSCULAR; INTRAVENOUS ONCE
Status: COMPLETED | OUTPATIENT
Start: 2023-09-08 | End: 2023-09-08

## 2023-09-08 RX ORDER — ONDANSETRON 4 MG/1
4 TABLET, ORALLY DISINTEGRATING ORAL 3 TIMES DAILY PRN
Qty: 10 TABLET | Refills: 0 | Status: SHIPPED | OUTPATIENT
Start: 2023-09-08

## 2023-09-08 RX ORDER — KETOROLAC TROMETHAMINE 30 MG/ML
15 INJECTION, SOLUTION INTRAMUSCULAR; INTRAVENOUS ONCE
Status: COMPLETED | OUTPATIENT
Start: 2023-09-08 | End: 2023-09-08

## 2023-09-08 RX ADMIN — KETOROLAC TROMETHAMINE 15 MG: 30 INJECTION, SOLUTION INTRAMUSCULAR; INTRAVENOUS at 12:36

## 2023-09-08 RX ADMIN — POTASSIUM CHLORIDE 40 MEQ: 750 TABLET, FILM COATED, EXTENDED RELEASE ORAL at 13:30

## 2023-09-08 RX ADMIN — SODIUM CHLORIDE 1000 ML: 9 INJECTION, SOLUTION INTRAVENOUS at 12:31

## 2023-09-08 RX ADMIN — ONDANSETRON 4 MG: 2 INJECTION INTRAMUSCULAR; INTRAVENOUS at 12:36

## 2023-09-08 ASSESSMENT — PAIN - FUNCTIONAL ASSESSMENT: PAIN_FUNCTIONAL_ASSESSMENT: 0-10

## 2023-09-08 ASSESSMENT — PAIN SCALES - GENERAL
PAINLEVEL_OUTOF10: 7
PAINLEVEL_OUTOF10: 7
PAINLEVEL_OUTOF10: 5

## 2023-09-08 ASSESSMENT — PAIN DESCRIPTION - LOCATION
LOCATION: HEAD

## 2023-09-08 NOTE — ED NOTES
AVS printed and reviewed with patient. All questions answered, teaching complete on medications and where to . Information given on 3 shelters in the area due to patient safety concerns stated at this visit. Patient ambulates out of room to private vehicle with all belongings.      Lana Dotson RN  09/08/23 3185

## 2023-09-08 NOTE — ED PROVIDER NOTES
chronic pain        Relevant Medications    ketorolac (TORADOL) injection 15 mg (Completed)    Hypokalemia                Differential Diagnosis: Migraine, chronic pain, psychiatric disorder  Diagnoses Considered but Do Not Suspect:  Not applicable    Pertinent Comorbid Conditions:   History of chronic headaches    2)  Data Reviewed  My EKG interpretation:  Not applicable     Decision Rules/Scores utilized:  Not applicable                   Tests considered but not ordered and why:  Not applicable      External Documents Reviewed:   Recent ER visit      Imaging that is independently reviewed with the radiologist report, not interpreted by me are:  Not applicable    Imaging that is independently reviewed and interpreted by me as:  Not applicable    See more data below for the lab and radiology tests and orders. 3)  Treatment and Disposition    Patient repeat assessment:   Labs are reassuring although potassium is a bit low at 2.7. She does report diarrhea. I again talked to her about causes of vomiting diarrhea etc.  She is convinced she got some type of infection from tap water she received at the fair. She then thinks she got exposed to someone else with the flu. She was not sure whether this was influenza or GI flu. I counseled her either way if she has influenza there is no other treatment plan especially in light of been present for several days. Not a candidate for Tamiflu. Her potassium is 2.7. I attempted to reassure her of my suspicion for being poisoned would be low and likely has either viral illness or such. She could even have slight withdrawal component from her medicines that she has not taken all week. There is no medical indication for hospitalization. In regards to safety at home I recommended going to [facility] where they have shelters if she does not feel safe.   She is not actively suicidal or homicidal.       Disposition discussion with patient/family:  Patient only    Case discussed with a consulting clinician:  No    Social determinants of health impacting treatment or disposition:   Patient does not feel safe going home. No medical indication for hospitalization. Shared Decision Making:  Not applicable    Code Status Discussion:  Not applicable      FINAL  REASSESSMENT   1:33 PM     Patient be discharged home. She is trying to have a stool specimen here to do GI panel. TSH is pending. CRITICAL CARE TIME   None    PROCEDURES:  None  Procedures     FINAL IMPRESSION      1. Nausea vomiting and diarrhea    2. Other chronic pain    3.  Hypokalemia          DISPOSITION/PLAN     DISPOSITION Discharge - Pending Orders Complete 09/08/2023 01:06:00 PM      PATIENT REFERRED TO:  Humberto Nichols DO  [address] [facility] [address]  [phone]    Call   Follow up from ER condition      DISCHARGE MEDICATIONS:  New Prescriptions    LOPERAMIDE (RA ANTI-DIARRHEAL) 2 MG CAPSULE    Take 1 capsule by mouth 4 times daily as needed for Diarrhea    ONDANSETRON (ZOFRAN-ODT) 4 MG DISINTEGRATING TABLET    Take 1 tablet by mouth 3 times daily as needed for Nausea or Vomiting       (Please note that portions of this note were completed with a voice recognition program.  Efforts were made to edit the dictations but occasionally words are mis-transcribed.)    Blade Menon MD (electronically signed)  Attending Emergency Physician                      Blade Menon MD  09/08/23 0103
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
## 2023-09-08 NOTE — ED NOTES
Patient presents to ED with c/o migraine, nausea, vomiting, and bilateral flank pain X 2 days. She states that she has not been eating and drinking very much since then as well. Patient ambulates unaided into room, VSS and charted, no distress noted, on room air, call light in reach.      Mae Villafana RN  09/08/23 4920

## 2023-09-09 ENCOUNTER — APPOINTMENT (OUTPATIENT)
Dept: GENERAL RADIOLOGY | Age: 38
End: 2023-09-09

## 2023-09-09 ENCOUNTER — HOSPITAL ENCOUNTER (EMERGENCY)
Age: 38
Discharge: HOME | End: 2023-09-10
Attending: EMERGENCY MEDICINE

## 2023-09-09 DIAGNOSIS — E87.6 HYPOKALEMIA: Primary | ICD-10-CM

## 2023-09-09 LAB
ALBUMIN SERPL BCG-MCNC: 4.4 G/DL (ref 3.5–5.1)
ALP SERPL-CCNC: 57 U/L (ref 38–126)
ALT SERPL W/O P-5'-P-CCNC: 16 U/L (ref 11–66)
ANION GAP SERPL CALC-SCNC: 11 MEQ/L (ref 8–16)
AST SERPL-CCNC: 14 U/L (ref 5–40)
BASOPHILS ABSOLUTE: 0.1 THOU/MM3 (ref 0–0.1)
BASOPHILS NFR BLD AUTO: 0.7 %
BILIRUB CONJ SERPL-MCNC: < 0.2 MG/DL (ref 0–0.3)
BILIRUB SERPL-MCNC: 0.5 MG/DL (ref 0.3–1.2)
BUN SERPL-MCNC: 9 MG/DL (ref 7–22)
C CAYETANENSIS DNA SPEC QL NAA+PROBE: NOT DETECTED
CALCIUM SERPL-MCNC: 8.9 MG/DL (ref 8.5–10.5)
CAMPY SP DNA.DIARRHEA STL QL NAA+PROBE: NOT DETECTED
CHLORIDE SERPL-SCNC: 104 MEQ/L (ref 98–111)
CO2 SERPL-SCNC: 28 MEQ/L (ref 23–33)
CREAT SERPL-MCNC: 0.5 MG/DL (ref 0.4–1.2)
CRYPTOSP DNA SPEC QL NAA+PROBE: NOT DETECTED
DEPRECATED RDW RBC AUTO: 47.8 FL (ref 35–45)
E COLI O157H7 DNA SPEC QL NAA+PROBE: NORMAL
E HISTOLYT DNA SPEC QL NAA+PROBE: NOT DETECTED
EAEC PAA PLAS AGGR+AATA ST NAA+NON-PRB: NOT DETECTED
EC STX1+STX2 + H7 FLIC SPEC NAA+PROBE: NOT DETECTED
EOSINOPHIL NFR BLD AUTO: 2.8 %
EOSINOPHILS ABSOLUTE: 0.4 THOU/MM3 (ref 0–0.4)
EPEC EAE GENE STL QL NAA+NON-PROBE: NOT DETECTED
ERYTHROCYTE [DISTWIDTH] IN BLOOD BY AUTOMATED COUNT: 14.3 % (ref 11.5–14.5)
ETEC LTA+ST1A+ST1B TOX ST NAA+NON-PROBE: NOT DETECTED
G LAMBLIA DNA SPEC QL NAA+PROBE: NOT DETECTED
GFR SERPL CREATININE-BSD FRML MDRD: > 60 ML/MIN/1.73M2
GLUCOSE SERPL-MCNC: 96 MG/DL (ref 70–108)
HADV DNA SPEC QL NAA+PROBE: NOT DETECTED
HASTV RNA SPEC QL NAA+PROBE: NOT DETECTED
HCT VFR BLD AUTO: 42.5 % (ref 37–47)
HGB BLD-MCNC: 14.3 GM/DL (ref 12–16)
IMM GRANULOCYTES # BLD AUTO: 0.05 THOU/MM3 (ref 0–0.07)
IMM GRANULOCYTES NFR BLD AUTO: 0.3 %
LYMPHOCYTES ABSOLUTE: 2.5 THOU/MM3 (ref 1–4.8)
LYMPHOCYTES NFR BLD AUTO: 17.2 %
MCH RBC QN AUTO: 30.8 PG (ref 26–33)
MCHC RBC AUTO-ENTMCNC: 33.6 GM/DL (ref 32.2–35.5)
MCV RBC AUTO: 91.4 FL (ref 81–99)
MONOCYTES ABSOLUTE: 1.2 THOU/MM3 (ref 0.4–1.3)
MONOCYTES NFR BLD AUTO: 8.4 %
NEUTROPHILS NFR BLD AUTO: 70.6 %
NOROVIRUS GI + GII RNA STL NAA+PROBE: NOT DETECTED
NRBC BLD AUTO-RTO: 0 /100 WBC
OSMOLALITY SERPL CALC.SUM OF ELEC: 283.5 MOSMOL/KG (ref 275–300)
P SHIGELLOIDES DNA STL QL NAA+PROBE: NOT DETECTED
PLATELET # BLD AUTO: 310 THOU/MM3 (ref 130–400)
PMV BLD AUTO: 10.1 FL (ref 9.4–12.4)
POTASSIUM SERPL-SCNC: 3.3 MEQ/L (ref 3.5–5.2)
PROT SERPL-MCNC: 6 G/DL (ref 6.1–8)
RBC # BLD AUTO: 4.65 MILL/MM3 (ref 4.2–5.4)
RV RNA SPEC QL NAA+PROBE: NOT DETECTED
SALMONELLA DNA SPEC QL NAA+PROBE: NOT DETECTED
SAPOVIRUS RNA SPEC QL NAA+PROBE: NOT DETECTED
SEGMENTED NEUTROPHILS ABSOLUTE COUNT: 10.2 THOU/MM3 (ref 1.8–7.7)
SHIGELLA SP+EIEC IPAH ST NAA+NON-PROBE: NOT DETECTED
SODIUM SERPL-SCNC: 143 MEQ/L (ref 135–145)
V CHOLERAE DNA SPEC QL NAA+PROBE: NOT DETECTED
VIBRIO DNA SPEC NAA+PROBE: NOT DETECTED
WBC # BLD AUTO: 14.5 THOU/MM3 (ref 4.8–10.8)
Y ENTERO RECN STL QL NAA+PROBE: NOT DETECTED

## 2023-09-09 PROCEDURE — 36415 COLL VENOUS BLD VENIPUNCTURE: CPT

## 2023-09-09 PROCEDURE — 71046 X-RAY EXAM CHEST 2 VIEWS: CPT

## 2023-09-09 PROCEDURE — 99284 EMERGENCY DEPT VISIT MOD MDM: CPT

## 2023-09-09 PROCEDURE — 82248 BILIRUBIN DIRECT: CPT

## 2023-09-09 PROCEDURE — 80053 COMPREHEN METABOLIC PANEL: CPT

## 2023-09-09 PROCEDURE — 85025 COMPLETE CBC W/AUTO DIFF WBC: CPT

## 2023-09-09 ASSESSMENT — PAIN SCALES - GENERAL: PAINLEVEL_OUTOF10: 4

## 2023-09-09 ASSESSMENT — PATIENT HEALTH QUESTIONNAIRE - PHQ9
SUM OF ALL RESPONSES TO PHQ QUESTIONS 1-9: 0
SUM OF ALL RESPONSES TO PHQ QUESTIONS 1-9: 0
1. LITTLE INTEREST OR PLEASURE IN DOING THINGS: 0
SUM OF ALL RESPONSES TO PHQ QUESTIONS 1-9: 0
SUM OF ALL RESPONSES TO PHQ9 QUESTIONS 1 & 2: 0
SUM OF ALL RESPONSES TO PHQ QUESTIONS 1-9: 0
2. FEELING DOWN, DEPRESSED OR HOPELESS: 0

## 2023-09-09 ASSESSMENT — SLEEP AND FATIGUE QUESTIONNAIRES
DO YOU HAVE DIFFICULTY SLEEPING: NO
DO YOU USE A SLEEP AID: NO

## 2023-09-09 ASSESSMENT — PAIN - FUNCTIONAL ASSESSMENT: PAIN_FUNCTIONAL_ASSESSMENT: 0-10

## 2023-09-09 ASSESSMENT — PAIN DESCRIPTION - LOCATION: LOCATION: ABDOMEN

## 2023-09-09 ASSESSMENT — PAIN DESCRIPTION - ORIENTATION: ORIENTATION: RIGHT;LOWER

## 2023-09-10 VITALS
HEIGHT: 61 IN | BODY MASS INDEX: 43.43 KG/M2 | TEMPERATURE: 97.9 F | SYSTOLIC BLOOD PRESSURE: 143 MMHG | WEIGHT: 230 LBS | HEART RATE: 68 BPM | DIASTOLIC BLOOD PRESSURE: 78 MMHG | RESPIRATION RATE: 16 BRPM | OXYGEN SATURATION: 97 %

## 2023-09-10 LAB
BACTERIA URNS QL MICRO: ABNORMAL /HPF
BILIRUB UR QL STRIP.AUTO: NEGATIVE
CASTS #/AREA URNS LPF: ABNORMAL /LPF
CASTS 2: ABNORMAL /LPF
CHARACTER UR: CLEAR
COLOR: YELLOW
CRYSTALS URNS MICRO: ABNORMAL
EPITHELIAL CELLS, UA: ABNORMAL /HPF
GLUCOSE UR QL STRIP.AUTO: NEGATIVE MG/DL
HGB UR QL STRIP.AUTO: NEGATIVE
KETONES UR QL STRIP.AUTO: 15
MISCELLANEOUS 2: ABNORMAL
MUCOUS THREADS URNS QL MICRO: ABNORMAL
NITRITE UR QL STRIP: NEGATIVE
PH UR STRIP.AUTO: 6 [PH] (ref 5–9)
PROT UR STRIP.AUTO-MCNC: ABNORMAL MG/DL
RBC URINE: ABNORMAL /HPF
REASON FOR REJECTION: NORMAL
REJECTED TEST: NORMAL
RENAL EPI CELLS #/AREA URNS HPF: ABNORMAL /[HPF]
SP GR UR REFRACT.AUTO: 1.03 (ref 1–1.03)
UROBILINOGEN, URINE: 1 EU/DL (ref 0–1)
WBC #/AREA URNS HPF: ABNORMAL /HPF
WBC #/AREA URNS HPF: NEGATIVE /[HPF]
YEAST LIKE FUNGI URNS QL MICRO: ABNORMAL

## 2023-09-10 PROCEDURE — 81001 URINALYSIS AUTO W/SCOPE: CPT

## 2023-09-10 PROCEDURE — 6370000000 HC RX 637 (ALT 250 FOR IP)

## 2023-09-10 RX ORDER — POTASSIUM CHLORIDE 20 MEQ/1
40 TABLET, EXTENDED RELEASE ORAL ONCE
Status: COMPLETED | OUTPATIENT
Start: 2023-09-10 | End: 2023-09-10

## 2023-09-10 RX ORDER — POTASSIUM CHLORIDE 750 MG/1
10 TABLET, EXTENDED RELEASE ORAL 2 TIMES DAILY
Qty: 4 TABLET | Refills: 0 | Status: SHIPPED | OUTPATIENT
Start: 2023-09-10 | End: 2023-09-12

## 2023-09-10 RX ADMIN — POTASSIUM CHLORIDE 40 MEQ: 1500 TABLET, EXTENDED RELEASE ORAL at 00:44

## 2023-09-10 NOTE — ED PROVIDER NOTES
[facility] EMERGENCY DEPT      EMERGENCY MEDICINE     Pt Name: Elías García  MRN: [id number]  [address] [facility] 1985  Date of evaluation: 9/9/2023  Resident Physician: Glenys Betancourt MD  Attending Physician: Dr. Bari Barnes att. providers found    [address]       Chief Complaint   Patient presents with    [address] [facility]   Elías García is a 45 y.o. female who presents to the emergency department from home, by private vehicle for evaluation of multiple    Patient states that she has not been feeling well for a long time when asked how long she says about a week. Patient says she had multiple bouts of nonbloody nonmucoid diarrhea as well as feeling fatigued and rundown. Patient states that she has not had any abdominal pain, flank pain, chest pain, shortness of breath, dysuria or hematuria. Patient says she was seen at a freestanding emergency department yesterday was told that her potassium was little bit low but nobody went over the rest of her results with her. Patient says that she knows her body and that she knows something is wrong and would like further testing. The patient has no other acute complaints at this time. ROS Negative Except as Documented Above. PASTMEDICAL HISTORY     Past Medical History:   Diagnosis Date    Allergic rhinitis     Anemia     Asthma     Depression     Headache     Herniated intervertebral disc of lumbar spine     Neck pain     from trauma    Shortness of breath     Tobacco abuse        Patient Active Problem List   Diagnosis Code    Tobacco abuse Z72.0    Depression F32. A    Dysmenorrhea N94.6    Vitamin D deficiency E55.9    Environmental allergies Z91.09    Moderate asthma J45.909    Cervical radiculopathy M54.12    Cervicalgia M54.2    Myofascial pain M79.18    Neuroforaminal stenosis of cervical spine M48.02    Migraine without aura and with status migrainosus, not intractable G43.001    Acute psychosis ([address]) F23     SURGICAL

## 2023-09-10 NOTE — ED NOTES
Level C paged at this time. Pt requesting to speak with Heron James.    Significant other at bedside per pt request.      Danial Osullivan RN  09/09/23 9679

## 2023-09-10 NOTE — ED TRIAGE NOTES
Pt arrives via EMS for mental health issue. Pt denies SI or HI. Pt is not cooperative with staff with providing information. Pt requesting  to come speak with her. EMS states pt was difficult to convince to come to be seen and EMS had also been to her house earlier this morning. When asked about pain, pt became upset stating \"I DON'T WANT PAIN MEDICATION\". RN explained that she was not administering any medication to pt at this time. Pt is tearful.

## 2023-09-10 NOTE — PROGRESS NOTES
MARIA VICTORIA to monitor for time of discharge.
tearful during this assessment. Level of Care Disposition:      2234  Consulted with medical provider, Dr Kentrell Griffiths. Dr Kentrell Griffiths to assess patient for plan of care. 0  Consulted with Dr Kentrell Griffiths regarding patient's current plan of care. Waiting on medical clearance currently. [address] Bernice Bolden to clinician Siri Hernandez.

## 2023-09-10 NOTE — ED NOTES
Pt aware of urine specimen rejection. Pt provided with PO water and new urine specimen cup. Dr Garcias Jalen to bedside to speak with pt at this time.      Kena Hodgson RN  09/10/23 5207

## 2023-09-10 NOTE — DISCHARGE INSTRUCTIONS
No acute life threatening abnormalities identified today. We are prescribing you potassium to be taken for the next three days. Return for re-evaluation if you develop any racing heart, vomiting, blood in stool, abdominal pain, or lose consciousness.

## 2023-09-18 ENCOUNTER — HOSPITAL ENCOUNTER (OUTPATIENT)
Age: 38
Discharge: HOME | End: 2023-09-18

## 2023-09-18 ENCOUNTER — OFFICE VISIT (OUTPATIENT)
Dept: FAMILY MEDICINE CLINIC | Age: 38
End: 2023-09-18

## 2023-09-18 VITALS
BODY MASS INDEX: 41.35 KG/M2 | HEIGHT: 61 IN | OXYGEN SATURATION: 99 % | HEART RATE: 99 BPM | WEIGHT: 219 LBS | SYSTOLIC BLOOD PRESSURE: 138 MMHG | DIASTOLIC BLOOD PRESSURE: 92 MMHG

## 2023-09-18 DIAGNOSIS — R73.01 ELEVATED FASTING GLUCOSE: ICD-10-CM

## 2023-09-18 DIAGNOSIS — Z20.2 POSSIBLE EXPOSURE TO STD: Primary | ICD-10-CM

## 2023-09-18 DIAGNOSIS — E87.6 HYPOKALEMIA: ICD-10-CM

## 2023-09-18 DIAGNOSIS — Z20.2 POSSIBLE EXPOSURE TO STD: ICD-10-CM

## 2023-09-18 LAB
ALBUMIN SERPL-MCNC: 4.8 G/DL (ref 3.5–5.2)
ALBUMIN/GLOB SERPL: 1.9 {RATIO} (ref 1–2.5)
ALP SERPL-CCNC: 62 U/L (ref 35–104)
ALT SERPL-CCNC: 13 U/L (ref 5–33)
ANION GAP SERPL CALCULATED.3IONS-SCNC: 11 MMOL/L (ref 9–17)
AST SERPL-CCNC: 12 U/L
BACTERIA URNS QL MICRO: ABNORMAL
BILIRUB SERPL-MCNC: 0.4 MG/DL (ref 0.3–1.2)
BILIRUB UR QL STRIP: NEGATIVE
BUN SERPL-MCNC: 14 MG/DL (ref 6–20)
BUN/CREAT SERPL: 23 (ref 9–20)
CALCIUM SERPL-MCNC: 9.9 MG/DL (ref 8.6–10.4)
CANDIDA SPECIES: NEGATIVE
CHARACTER UR: ABNORMAL
CHLORIDE SERPL-SCNC: 98 MMOL/L (ref 98–107)
CLARITY UR: ABNORMAL
CO2 SERPL-SCNC: 27 MMOL/L (ref 20–31)
COLOR UR: YELLOW
CREAT SERPL-MCNC: 0.6 MG/DL (ref 0.5–0.9)
EPI CELLS #/AREA URNS HPF: ABNORMAL /HPF (ref 0–5)
GARDNERELLA VAGINALIS: POSITIVE
GFR SERPL CREATININE-BSD FRML MDRD: >60 ML/MIN/1.73M2
GLUCOSE SERPL-MCNC: 87 MG/DL (ref 70–99)
GLUCOSE UR STRIP-MCNC: NEGATIVE MG/DL
HCV AB SERPL QL IA: NONREACTIVE
HGB UR QL STRIP.AUTO: NEGATIVE
HIV 1+2 AB+HIV1 P24 AG SERPL QL IA: NONREACTIVE
KETONES UR STRIP-MCNC: NEGATIVE MG/DL
LEUKOCYTE ESTERASE UR QL STRIP: NEGATIVE
MUCOUS THREADS URNS QL MICRO: ABNORMAL
NITRITE UR QL STRIP: NEGATIVE
PH UR STRIP: 6.5 [PH] (ref 5–6)
POTASSIUM SERPL-SCNC: 4.2 MMOL/L (ref 3.7–5.3)
PROT SERPL-MCNC: 7.3 G/DL (ref 6.4–8.3)
PROT UR STRIP-MCNC: NEGATIVE MG/DL
RBC #/AREA URNS HPF: ABNORMAL /HPF (ref 0–4)
SODIUM SERPL-SCNC: 136 MMOL/L (ref 135–144)
SOURCE: ABNORMAL
SP GR UR STRIP: 1.02 (ref 1.01–1.02)
T PALLIDUM AB SER QL IA: NONREACTIVE
TRICHOMONAS: NEGATIVE
UROBILINOGEN UR STRIP-ACNC: NORMAL EU/DL (ref 0–1)
WBC #/AREA URNS HPF: ABNORMAL /HPF (ref 0–4)

## 2023-09-18 PROCEDURE — 87510 GARDNER VAG DNA DIR PROBE: CPT

## 2023-09-18 PROCEDURE — 83036 HEMOGLOBIN GLYCOSYLATED A1C: CPT

## 2023-09-18 PROCEDURE — 87660 TRICHOMONAS VAGIN DIR PROBE: CPT

## 2023-09-18 PROCEDURE — 87591 N.GONORRHOEAE DNA AMP PROB: CPT

## 2023-09-18 PROCEDURE — 99214 OFFICE O/P EST MOD 30 MIN: CPT | Performed by: STUDENT IN AN ORGANIZED HEALTH CARE EDUCATION/TRAINING PROGRAM

## 2023-09-18 PROCEDURE — 81001 URINALYSIS AUTO W/SCOPE: CPT

## 2023-09-18 PROCEDURE — 87480 CANDIDA DNA DIR PROBE: CPT

## 2023-09-18 PROCEDURE — 36415 COLL VENOUS BLD VENIPUNCTURE: CPT

## 2023-09-18 PROCEDURE — 80053 COMPREHEN METABOLIC PANEL: CPT

## 2023-09-18 PROCEDURE — 86780 TREPONEMA PALLIDUM: CPT

## 2023-09-18 PROCEDURE — 87491 CHLMYD TRACH DNA AMP PROBE: CPT

## 2023-09-18 PROCEDURE — 86803 HEPATITIS C AB TEST: CPT

## 2023-09-18 PROCEDURE — 87389 HIV-1 AG W/HIV-1&-2 AB AG IA: CPT
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
## 2023-09-19 ENCOUNTER — PATIENT MESSAGE (OUTPATIENT)
Dept: FAMILY MEDICINE CLINIC | Age: 38
End: 2023-09-19

## 2023-09-19 LAB
EST. AVERAGE GLUCOSE BLD GHB EST-MCNC: 97 MG/DL
HBA1C MFR BLD: 5 % (ref 4–6)

## 2023-09-20 LAB
CHLAMYDIA DNA UR QL NAA+PROBE: NEGATIVE
N GONORRHOEA DNA UR QL NAA+PROBE: NEGATIVE
SPECIMEN DESCRIPTION: NORMAL

## 2023-09-20 ASSESSMENT — ENCOUNTER SYMPTOMS
BLOOD IN STOOL: 0
EYE PAIN: 0
DIARRHEA: 0
TROUBLE SWALLOWING: 0
ABDOMINAL PAIN: 0
NAUSEA: 0
CONSTIPATION: 0
VOMITING: 0
SHORTNESS OF BREATH: 0
COUGH: 0

## 2023-09-20 NOTE — TELEPHONE ENCOUNTER
From: Candida Rodriguez  To: Dr. Augustin Boy: 9/19/2023 12:14 PM EDT  Subject: Test results    I can see them but don't understand them can you please explain

## 2023-09-21 RX ORDER — METRONIDAZOLE 500 MG/1
500 TABLET ORAL 2 TIMES DAILY
Qty: 14 TABLET | Refills: 0 | Status: SHIPPED | OUTPATIENT
Start: 2023-09-21 | End: 2023-09-28

## 2023-09-21 NOTE — TELEPHONE ENCOUNTER
Brionna Seay called requesting a refill of the below medication which has been pended for you:     Requested Prescriptions     Pending Prescriptions Disp Refills    metroNIDAZOLE (FLAGYL) 500 MG tablet 14 tablet 0     Sig: Take 1 tablet by mouth 2 times daily for 7 days       Last Appointment Date: 9/18/2023  Next Appointment Date: Visit date not found    Allergies   Allergen Reactions    Norco [Hydrocodone-Acetaminophen] Other (See Comments)     Patient stated it gave her severe headaches

## 2023-10-04 DIAGNOSIS — M54.12 CERVICAL RADICULOPATHY: ICD-10-CM

## 2023-10-04 DIAGNOSIS — M79.18 MYOFASCIAL PAIN: ICD-10-CM

## 2023-10-04 DIAGNOSIS — M48.02 NEUROFORAMINAL STENOSIS OF CERVICAL SPINE: ICD-10-CM

## 2023-10-04 NOTE — TELEPHONE ENCOUNTER
Last Appt:  5/1/2023  Next Appt:   Visit date not found  Med verified in [address] [place] [address]

## 2023-10-05 RX ORDER — TRAMADOL HYDROCHLORIDE 50 MG/1
TABLET ORAL
Refills: 0 | OUTPATIENT
Start: 2023-10-05 | End: 2023-11-04

## 2023-10-05 RX ORDER — PREGABALIN 200 MG/1
200 CAPSULE ORAL 3 TIMES DAILY
Qty: 90 CAPSULE | Refills: 0 | OUTPATIENT
Start: 2023-10-05 | End: 2023-11-04

## 2023-10-05 RX ORDER — PREGABALIN 200 MG/1
200 CAPSULE ORAL 3 TIMES DAILY
Qty: 90 CAPSULE | Refills: 3 | OUTPATIENT
Start: 2023-10-05 | End: 2023-11-04

## 2023-10-05 NOTE — TELEPHONE ENCOUNTER
Lyrica 200 mg  Last Appt:  5/1/2023  Next Appt:   Visit date not found  Med verified in [address] [place] [address]

## 2023-10-05 NOTE — TELEPHONE ENCOUNTER
Chavo Cornejo called requesting a refill of the below medication which has been pended for you:     Requested Prescriptions     Pending Prescriptions Disp Refills    pregabalin (LYRICA) 200 MG capsule [Pharmacy Med Name: Pregabalin 200 MG Oral Capsule] 90 capsule 0     Sig: Take 1 capsule by mouth 3 times daily for 30 days. Max Daily Amount: 600 mg    traMADol (ULTRAM) 50 MG tablet [Pharmacy Med Name: traMADol HCl 50 MG Oral Tablet] 30 tablet 0     Sig: TAKE 1 TABLET BY MOUTH TWICE DAILY AS NEEDED FOR PAIN. MAX 2 TABS DAILY. Last Appointment Date: 5/1/2023  Next Appointment Date: Visit date not found    Allergies   Allergen Reactions    Norco [Hydrocodone-Acetaminophen] Other (See Comments)     Patient stated it gave her severe headaches       Pharmacy:  [address] [place] [address] checked for [place], [place], and [place]:    07/31/2023 Tramadol 50mg #60    Patient has not been seen since May and does not have an appt. Will you fill?

## 2023-10-06 RX ORDER — BUTALBITAL, ACETAMINOPHEN AND CAFFEINE 50; 325; 40 MG/1; MG/1; MG/1
1 TABLET ORAL EVERY 4 HOURS PRN
Qty: 60 TABLET | Refills: 5 | OUTPATIENT
Start: 2023-10-06

## 2023-10-06 RX ORDER — PREGABALIN 200 MG/1
200 CAPSULE ORAL 3 TIMES DAILY
Qty: 90 CAPSULE | Refills: 5 | OUTPATIENT
Start: 2023-10-06 | End: 2024-04-03

## 2023-10-10 ENCOUNTER — TELEPHONE (OUTPATIENT)
Dept: FAMILY MEDICINE CLINIC | Age: 38
End: 2023-10-10

## 2023-10-10 DIAGNOSIS — M54.12 CERVICAL RADICULOPATHY: ICD-10-CM

## 2023-10-10 DIAGNOSIS — M79.18 MYOFASCIAL PAIN: ICD-10-CM

## 2023-10-10 RX ORDER — BUTALBITAL, ACETAMINOPHEN AND CAFFEINE 50; 325; 40 MG/1; MG/1; MG/1
TABLET ORAL
Qty: 60 TABLET | Refills: 0 | OUTPATIENT
Start: 2023-10-10

## 2023-10-10 NOTE — TELEPHONE ENCOUNTER
----- Message from P[place] Teodoramayra sent at 10/10/2023  1:39 PM EDT -----  Subject: Refill Request    QUESTIONS  Name of Medication? butalbital-acetaminophen-caffeine (FIORICET, ESGIC)   -40 MG per tablet  Patient-reported dosage and instructions? n/a  How many days do you have left? 0  Preferred Pharmacy? [facility]  Pharmacy phone number (if available)? [phone]  Additional Information for Provider? pt now has insurance an needs this   script sent over   ---------------------------------------------------------------------------  --------------  [address]  What is the best way for the office to contact you? OK to leave message on   voicemail  Preferred Call Back Phone Number? [phone]  ---------------------------------------------------------------------------  --------------  SCRIPT ANSWERS  Relationship to Patient?  Self

## 2023-10-18 ENCOUNTER — TELEPHONE (OUTPATIENT)
Dept: PAIN MANAGEMENT | Age: 38
End: 2023-10-18

## 2023-10-18 NOTE — TELEPHONE ENCOUNTER
Patient called crying and complaining that she needs her refill of lyrica. Patient has been told several times \"no refills til seen\". Yet again when told she complained stating that she needs her refills. No soon apts available that appt date of 10/24/23. Patient still insistent roxana she needs her meds. Writer transferred patient to family practice to put refill request in for PCP.

## 2023-10-25 ENCOUNTER — TELEMEDICINE (OUTPATIENT)
Dept: PAIN MANAGEMENT | Age: 38
End: 2023-10-25

## 2023-10-25 DIAGNOSIS — G62.9 NEUROPATHY: ICD-10-CM

## 2023-10-25 DIAGNOSIS — M48.02 NEUROFORAMINAL STENOSIS OF CERVICAL SPINE: ICD-10-CM

## 2023-10-25 DIAGNOSIS — M54.12 CERVICAL RADICULOPATHY: ICD-10-CM

## 2023-10-25 DIAGNOSIS — M79.18 MYOFASCIAL PAIN: ICD-10-CM

## 2023-10-25 PROCEDURE — 99214 OFFICE O/P EST MOD 30 MIN: CPT | Performed by: NURSE PRACTITIONER

## 2023-10-25 PROCEDURE — 99212 OFFICE O/P EST SF 10 MIN: CPT | Performed by: NURSE PRACTITIONER

## 2023-10-25 RX ORDER — PREGABALIN 200 MG/1
200 CAPSULE ORAL 3 TIMES DAILY
Qty: 90 CAPSULE | Refills: 5 | Status: SHIPPED | OUTPATIENT
Start: 2023-10-25 | End: 2024-04-22

## 2023-10-25 RX ORDER — BUTALBITAL, ACETAMINOPHEN AND CAFFEINE 50; 325; 40 MG/1; MG/1; MG/1
1 TABLET ORAL EVERY 4 HOURS PRN
Qty: 60 TABLET | Refills: 5 | Status: SHIPPED | OUTPATIENT
Start: 2023-10-25

## 2023-10-25 RX ORDER — DULOXETIN HYDROCHLORIDE 30 MG/1
30 CAPSULE, DELAYED RELEASE ORAL DAILY
Qty: 30 CAPSULE | Refills: 5 | Status: SHIPPED | OUTPATIENT
Start: 2023-10-25 | End: 2024-04-22

## 2023-10-25 RX ORDER — TRAMADOL HYDROCHLORIDE 50 MG/1
50 TABLET ORAL 2 TIMES DAILY PRN
Qty: 60 TABLET | Refills: 2 | Status: SHIPPED | OUTPATIENT
Start: 2023-10-25 | End: 2024-01-23

## 2023-10-25 ASSESSMENT — ENCOUNTER SYMPTOMS
RESPIRATORY NEGATIVE: 1
GASTROINTESTINAL NEGATIVE: 1
EYES NEGATIVE: 1

## 2023-10-25 NOTE — PROGRESS NOTES
10/25/2023    TELEHEALTH EVALUATION -- Audio/Visual    HPI:    Nora Aguilar (:  1985) has requested an audio/video evaluation for the following concern(s):    Needs refills    Seems satisfied with current regime, active-maintaining employment    Review of Systems   Constitutional:  Positive for activity change. HENT: Negative. Eyes: Negative. Respiratory: Negative. Cardiovascular: Negative. Gastrointestinal: Negative. Endocrine: Negative. Genitourinary: Negative. Musculoskeletal:  Positive for arthralgias and myalgias. Skin: Negative. Neurological:  Positive for weakness and numbness. Hematological: Negative. Psychiatric/Behavioral:  Positive for sleep disturbance. Prior to Visit Medications    Medication Sig Taking? Authorizing Provider   pregabalin (LYRICA) 200 MG capsule Take 1 capsule by mouth 3 times daily for 180 days. Max Daily Amount: 600 mg Yes RAMON Castaneda CNP   butalbital-acetaminophen-caffeine (FIORICET, ESGIC) -40 MG per tablet Take 1 tablet by mouth every 4 hours as needed for Headaches Yes Janey Lackey APRN - CNP   DULoxetine (CYMBALTA) 30 MG extended release capsule Take 1 capsule by mouth daily Yes Janey Lackey APRN - CNP   traMADol (ULTRAM) 50 MG tablet Take 1 tablet by mouth 2 times daily as needed for Pain (pain) for up to 90 days.  Max Daily Amount: 100 mg Yes Adriana Alexx D, RAMON - CNP   potassium chloride (KLOR-CON M) 10 MEQ extended release tablet Take 1 tablet by mouth 2 times daily for 2 days  Magdalena Causey MD   ondansetron (ZOFRAN-ODT) 4 MG disintegrating tablet Take 1 tablet by mouth 3 times daily as needed for Nausea or Vomiting  Narcisa rAmas MD   loperamide (RA ANTI-DIARRHEAL) 2 MG capsule Take 1 capsule by mouth 4 times daily as needed for Diarrhea  Patient not taking: Reported on 2023  Narcisa Armas MD   omeprazole (PRILOSEC) 40 MG delayed release capsule Take 1 capsule by mouth every

## 2023-11-29 NOTE — TELEPHONE ENCOUNTER
See [employer] message 6/13. Patient no showed 6/1. Patient scheduled 6/29 with Keerthi Lugo. Will need sooner appt to discuss dosage. ambulatory

## 2023-12-16 ENCOUNTER — APPOINTMENT (OUTPATIENT)
Dept: GENERAL RADIOLOGY | Age: 38
End: 2023-12-16
Payer: COMMERCIAL

## 2023-12-16 ENCOUNTER — HOSPITAL ENCOUNTER (EMERGENCY)
Age: 38
Discharge: HOME | End: 2023-12-16
Attending: EMERGENCY MEDICINE
Payer: COMMERCIAL

## 2023-12-16 VITALS
RESPIRATION RATE: 12 BRPM | HEART RATE: 60 BPM | BODY MASS INDEX: 36.88 KG/M2 | HEIGHT: 64 IN | WEIGHT: 216 LBS | SYSTOLIC BLOOD PRESSURE: 156 MMHG | DIASTOLIC BLOOD PRESSURE: 89 MMHG | OXYGEN SATURATION: 97 % | TEMPERATURE: 98.9 F

## 2023-12-16 DIAGNOSIS — J20.9 BRONCHITIS WITH BRONCHOSPASM: Primary | ICD-10-CM

## 2023-12-16 LAB
FLUAV AG SPEC QL: NEGATIVE
FLUBV AG SPEC QL: NEGATIVE
SARS-COV-2 RDRP RESP QL NAA+PROBE: NOT DETECTED
SPECIMEN DESCRIPTION: NORMAL

## 2023-12-16 PROCEDURE — 87635 SARS-COV-2 COVID-19 AMP PRB: CPT

## 2023-12-16 PROCEDURE — 71045 X-RAY EXAM CHEST 1 VIEW: CPT

## 2023-12-16 PROCEDURE — 87804 INFLUENZA ASSAY W/OPTIC: CPT

## 2023-12-16 RX ORDER — AZITHROMYCIN 250 MG/1
TABLET, FILM COATED ORAL
Qty: 1 PACKET | Refills: 0 | Status: SHIPPED | OUTPATIENT
Start: 2023-12-16 | End: 2023-12-20

## 2023-12-16 RX ORDER — PREDNISONE 10 MG/1
TABLET ORAL
Qty: 20 TABLET | Refills: 0 | Status: SHIPPED | OUTPATIENT
Start: 2023-12-16 | End: 2023-12-26

## 2023-12-16 RX ORDER — ALBUTEROL SULFATE 90 UG/1
2 AEROSOL, METERED RESPIRATORY (INHALATION) EVERY 6 HOURS PRN
Qty: 18 G | Refills: 3 | Status: SHIPPED | OUTPATIENT
Start: 2023-12-16

## 2023-12-16 RX ORDER — ONDANSETRON 4 MG/1
4 TABLET, ORALLY DISINTEGRATING ORAL 3 TIMES DAILY PRN
Qty: 21 TABLET | Refills: 0 | Status: SHIPPED | OUTPATIENT
Start: 2023-12-16

## 2023-12-16 ASSESSMENT — PAIN SCALES - GENERAL
PAINLEVEL_OUTOF10: 7
PAINLEVEL_OUTOF10: 7

## 2023-12-16 ASSESSMENT — PAIN - FUNCTIONAL ASSESSMENT
PAIN_FUNCTIONAL_ASSESSMENT: NONE - DENIES PAIN
PAIN_FUNCTIONAL_ASSESSMENT: ACTIVITIES ARE NOT PREVENTED
PAIN_FUNCTIONAL_ASSESSMENT: 0-10
PAIN_FUNCTIONAL_ASSESSMENT: 0-10

## 2023-12-16 ASSESSMENT — PAIN DESCRIPTION - FREQUENCY
FREQUENCY: CONTINUOUS
FREQUENCY: CONTINUOUS

## 2023-12-16 ASSESSMENT — PAIN DESCRIPTION - LOCATION
LOCATION: ABDOMEN;HEAD
LOCATION: GENERALIZED

## 2023-12-16 ASSESSMENT — PAIN DESCRIPTION - ONSET
ONSET: ON-GOING
ONSET: ON-GOING

## 2023-12-16 ASSESSMENT — PAIN DESCRIPTION - DESCRIPTORS: DESCRIPTORS: CRAMPING;THROBBING

## 2023-12-16 ASSESSMENT — PAIN DESCRIPTION - PAIN TYPE
TYPE: ACUTE PAIN
TYPE: ACUTE PAIN

## 2024-01-04 ENCOUNTER — TELEPHONE (OUTPATIENT)
Dept: FAMILY MEDICINE CLINIC | Age: 39
End: 2024-01-04

## 2024-01-04 NOTE — TELEPHONE ENCOUNTER
Attempted to reach patient regarding missed appointment on 1/4/24. Unable to contact at this time. Unable to leave message. Letter mailed to reschedule.

## 2024-01-16 ENCOUNTER — HOSPITAL ENCOUNTER (EMERGENCY)
Age: 39
Discharge: HOME | End: 2024-01-16
Attending: EMERGENCY MEDICINE

## 2024-01-16 VITALS
TEMPERATURE: 98.5 F | HEART RATE: 72 BPM | WEIGHT: 221.1 LBS | SYSTOLIC BLOOD PRESSURE: 140 MMHG | DIASTOLIC BLOOD PRESSURE: 94 MMHG | OXYGEN SATURATION: 98 % | BODY MASS INDEX: 37.75 KG/M2 | HEIGHT: 64 IN | RESPIRATION RATE: 16 BRPM
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
## 2024-01-16 DIAGNOSIS — S01.85XA OPEN WOUND OF FACE DUE TO DOG BITE: Primary | ICD-10-CM

## 2024-01-16 PROCEDURE — 6370000000 HC RX 637 (ALT 250 FOR IP): Performed by: EMERGENCY MEDICINE

## 2024-01-16 PROCEDURE — 99283 EMERGENCY DEPT VISIT LOW MDM: CPT

## 2024-01-16 RX ORDER — AMOXICILLIN AND CLAVULANATE POTASSIUM 875; 125 MG/1; MG/1
1 TABLET, FILM COATED ORAL 2 TIMES DAILY
Qty: 14 TABLET | Refills: 0 | Status: SHIPPED | OUTPATIENT
Start: 2024-01-16 | End: 2024-01-23

## 2024-01-16 RX ORDER — BACITRACIN ZINC 500 [USP'U]/G
OINTMENT TOPICAL ONCE
Status: COMPLETED | OUTPATIENT
Start: 2024-01-16 | End: 2024-01-16

## 2024-01-16 RX ADMIN — BACITRACIN ZINC: 500 OINTMENT TOPICAL at 16:58

## 2024-01-16 ASSESSMENT — PAIN DESCRIPTION - LOCATION: LOCATION: FACE

## 2024-01-16 ASSESSMENT — LIFESTYLE VARIABLES
HOW MANY STANDARD DRINKS CONTAINING ALCOHOL DO YOU HAVE ON A TYPICAL DAY: PATIENT DOES NOT DRINK
HOW OFTEN DO YOU HAVE A DRINK CONTAINING ALCOHOL: NEVER

## 2024-01-16 ASSESSMENT — ENCOUNTER SYMPTOMS
SHORTNESS OF BREATH: 0
PHOTOPHOBIA: 0
SORE THROAT: 0
BACK PAIN: 0
COUGH: 0

## 2024-01-16 ASSESSMENT — PAIN DESCRIPTION - DESCRIPTORS: DESCRIPTORS: ACHING

## 2024-01-16 ASSESSMENT — PAIN - FUNCTIONAL ASSESSMENT: PAIN_FUNCTIONAL_ASSESSMENT: 0-10

## 2024-01-16 ASSESSMENT — PAIN DESCRIPTION - PAIN TYPE: TYPE: ACUTE PAIN

## 2024-01-16 ASSESSMENT — PAIN SCALES - GENERAL: PAINLEVEL_OUTOF10: 5

## 2024-01-16 ASSESSMENT — PAIN DESCRIPTION - ORIENTATION: ORIENTATION: RIGHT

## 2024-01-16 ASSESSMENT — PAIN DESCRIPTION - FREQUENCY: FREQUENCY: CONTINUOUS

## 2024-01-16 ASSESSMENT — PAIN DESCRIPTION - ONSET: ONSET: ON-GOING

## 2024-01-16 NOTE — ED PROVIDER NOTES
ill-appearing, toxic-appearing or diaphoretic.   HENT:      Head: Normocephalic.      Comments: Patient has a superficial scratch or bite to the right nare there is nothing that requires suturing there is no septal hematoma she has is a scratch or very superficial bite at the angle of the jaw there is no bleeding trachea is midline     Right Ear: External ear normal.      Left Ear: External ear normal.   Eyes:      Conjunctiva/sclera: Conjunctivae normal.      Pupils: Pupils are equal, round, and reactive to light.   Cardiovascular:      Rate and Rhythm: Normal rate and regular rhythm.   Pulmonary:      Effort: Pulmonary effort is normal.      Breath sounds: Normal breath sounds.   Abdominal:      General: Bowel sounds are normal.      Palpations: Abdomen is soft.   Musculoskeletal:         General: No tenderness.      Cervical back: Normal range of motion.      Right lower leg: No edema.      Left lower leg: No edema.   Skin:     General: Skin is warm and dry.   Neurological:      General: No focal deficit present.      Mental Status: She is alert and oriented to person, place, and time.   Psychiatric:         Behavior: Behavior normal.           DIFFERENTIAL DIAGNOSIS/ MDM:     Superficial dog bite to right face and there no suturing required wounds will be cleansed she is up-to-date on her tetanus the dog bite form will be filled out to be placed on Augmentin    DIAGNOSTIC RESULTS     EKG: All EKG's are interpreted by the Emergency Department Physician who either signs or Co-signs this chart in the absence of a cardiologist.        RADIOLOGY:   No orders to display      I directly visualized the following  images and reviewed the radiologist interpretations:          ED BEDSIDE ULTRASOUND:       LABS:  Labs Reviewed - No data to display        EMERGENCY DEPARTMENT COURSE:   Vitals:    Vitals:    01/16/24 1622   BP: (!) 140/94   Pulse: 72   Resp: 16   Temp: 98.5 °F (36.9 °C)   TempSrc: Tympanic   SpO2: 98%

## 2024-02-12 ENCOUNTER — HOSPITAL ENCOUNTER (EMERGENCY)
Age: 39
Discharge: HOME | End: 2024-02-13
Attending: EMERGENCY MEDICINE

## 2024-02-12 VITALS
HEART RATE: 83 BPM | RESPIRATION RATE: 18 BRPM | DIASTOLIC BLOOD PRESSURE: 86 MMHG | BODY MASS INDEX: 37.95 KG/M2 | TEMPERATURE: 97.6 F | SYSTOLIC BLOOD PRESSURE: 135 MMHG | HEIGHT: 64 IN | OXYGEN SATURATION: 98 %

## 2024-02-12 DIAGNOSIS — F41.1 ANXIETY STATE: Primary | ICD-10-CM

## 2024-02-12 PROCEDURE — 99283 EMERGENCY DEPT VISIT LOW MDM: CPT

## 2024-02-12 PROCEDURE — 6370000000 HC RX 637 (ALT 250 FOR IP): Performed by: EMERGENCY MEDICINE

## 2024-02-12 RX ORDER — LORAZEPAM 0.5 MG/1
1 TABLET ORAL ONCE
Status: COMPLETED | OUTPATIENT
Start: 2024-02-12 | End: 2024-02-12

## 2024-02-12 RX ADMIN — LORAZEPAM 1 MG: 0.5 TABLET ORAL at 23:40

## 2024-07-07 DIAGNOSIS — J45.51 SEVERE PERSISTENT ASTHMA WITH ACUTE EXACERBATION: ICD-10-CM

## 2024-07-08 RX ORDER — CEFDINIR 300 MG/1
CAPSULE ORAL
Qty: 20 CAPSULE | Refills: 0 | OUTPATIENT
Start: 2024-07-08

## (undated) DEVICE — BANDAGE ADH DIA7/8IN NAT FLEX-FABRIC WVN FOR WND PROTCT

## (undated) DEVICE — STANDARD HYPODERMIC NEEDLE,POLYPROPYLENE HUB: Brand: MONOJECT

## (undated) DEVICE — GLOVE SURG SZ 8 L12IN THK91MIL BRN LTX FREE POLYCHLOROPRENE

## (undated) DEVICE — LINE SAMP O2 6.5FT W/FEMALE CONN F/ADULT CAPNOLINE PLUS

## (undated) DEVICE — TRAY PAIN CUST

## (undated) DEVICE — NEEDLE FLTR 18GA L1.5IN MEM THK5UM BLNT DISP

## (undated) DEVICE — SYRINGE MED 7ML PLAS LUERSLIP LOSS OF RESISTANCE EPILOR

## (undated) DEVICE — GLOVE ORANGE PI 8   MSG9080

## (undated) DEVICE — GLOVE SURG SZ 65 THK91MIL LTX FREE SYN POLYISOPRENE

## (undated) DEVICE — AVANOS* QUINCKE NEEDLE: Brand: AVANOS

## (undated) DEVICE — SET EXTN SM 0.5ML L13IN BOR W/O INJ SITE

## (undated) DEVICE — Z INACTIVE USE 2423038 APPLICATOR MEDICATED 10.5 CC CHLORHEXIDINE GLUC 2%

## (undated) DEVICE — GLOVE ORANGE PI 7   MSG9070